# Patient Record
Sex: FEMALE | Race: WHITE | Employment: OTHER | URBAN - METROPOLITAN AREA
[De-identification: names, ages, dates, MRNs, and addresses within clinical notes are randomized per-mention and may not be internally consistent; named-entity substitution may affect disease eponyms.]

---

## 2018-09-18 ENCOUNTER — HOSPITAL ENCOUNTER (EMERGENCY)
Age: 83
Discharge: HOME | End: 2018-09-19
Attending: EMERGENCY MEDICINE
Payer: MEDICARE

## 2018-09-18 DIAGNOSIS — R33.9 URINARY RETENTION: Primary | ICD-10-CM

## 2018-09-18 LAB
BACTERIA: ABNORMAL /HPF
BILIRUBIN URINE: NEGATIVE
BLOOD, URINE: NEGATIVE
CLARITY: CLEAR
COLOR: YELLOW
GLUCOSE URINE: NEGATIVE MG/DL
KETONES, URINE: NEGATIVE MG/DL
LEUKOCYTE ESTERASE, URINE: ABNORMAL
NITRITE, URINE: NEGATIVE
PH UA: 6.5 (ref 5–9)
PROTEIN UA: NEGATIVE MG/DL
RBC UA: ABNORMAL /HPF (ref 0–2)
SPECIFIC GRAVITY UA: <=1.005 (ref 1–1.03)
UROBILINOGEN, URINE: 0.2 E.U./DL
WBC UA: ABNORMAL /HPF (ref 0–5)

## 2018-09-18 PROCEDURE — 51702 INSERT TEMP BLADDER CATH: CPT

## 2018-09-18 PROCEDURE — 81001 URINALYSIS AUTO W/SCOPE: CPT

## 2018-09-18 PROCEDURE — 99283 EMERGENCY DEPT VISIT LOW MDM: CPT

## 2018-09-18 ASSESSMENT — ENCOUNTER SYMPTOMS
DIARRHEA: 0
VOMITING: 0
NAUSEA: 0
ABDOMINAL PAIN: 0
COLOR CHANGE: 0
CONSTIPATION: 0
SHORTNESS OF BREATH: 0

## 2018-09-19 VITALS
SYSTOLIC BLOOD PRESSURE: 151 MMHG | BODY MASS INDEX: 23.92 KG/M2 | HEIGHT: 62 IN | WEIGHT: 130 LBS | DIASTOLIC BLOOD PRESSURE: 79 MMHG | HEART RATE: 66 BPM | OXYGEN SATURATION: 97 % | TEMPERATURE: 98.6 F | RESPIRATION RATE: 16 BRPM

## 2018-09-19 NOTE — ED PROVIDER NOTES
ED ATTENDING NOTE    I saw and examined the patient. I discussed the history and examination with the resident and agree with the plan of care         HPI: Pt presents with urinary retention, hasn't been able to urinate since this am, pt has lower abdominal pressure, pt denies f/c, ha, cp, sob, cough, n/v/d. Pt is lying in bed in no acute distress. --------------------------------------------- PAST HISTORY ---------------------------------------------  Past Medical History:  has a past medical history of Epiretinal membrane. Past Surgical History:  has a past surgical history that includes Tonsillectomy and adenoidectomy; Dilation and curettage of uterus; Cataract removal with implant (Bilateral); repair retinal tear/hole (Left); Hemorrhoid surgery (years ago); and vitrectomy (Left, 03/11/2015). Social History:  reports that she has never smoked. She has never used smokeless tobacco. She reports that she does not drink alcohol or use drugs. Family History: family history is not on file. The patients home medications have been reviewed.     Allergies: Biaxin [clarithromycin] and Pcn [penicillins]    ROS: Review HPI for other details  Details in electronic record may supersede details below    Gen: no chills, fever  Eyes: no discharge, no change vision  ENT: no sore throat, no rhinitis  Cardiac: no chest pain, no palpitations  Resp: no sob, no cough  GI: (+) abd pain, no nausea, vomiting, or diarrhea  : no dysuria, urgency, change in color (+) retention  MS: no back pain, joint pain, no falls, no trauma   Skin: no rash, no itch  Neuro: no dizziness, headache, no focal paralysis or parasthesia  Psych: no hallucinations, no depression  Heme: no bruising, no bleeding  Other relevant systems reviewed and negative    Physical brief exam: See HPI for other details  Details in electronic record may supersede details below    Vital signs reviewed, please refer to nurses note  Constitutionall: No distress,

## 2018-09-19 NOTE — ED PROVIDER NOTES
80year old female with history of dementia, hypertension, who presents to the ED with acute urinary retention. Unable to go to the bathroom since this morning, had similar issue a month ago. Feels she needs to go, unable to go. Patient states that she has some mild lower abdominal pressure, has been having regular bowel movements, no loss of bowel or bladder function. She denies any dysuria or hematuria. She denies ever having a history of kidney stones. She is not nauseous, no chest pain, no shortness of breath. No recent medication changes. The history is provided by the patient. No  was used. Review of Systems   Constitutional: Negative for chills and fever. Respiratory: Negative for shortness of breath. Cardiovascular: Negative for chest pain and palpitations. Gastrointestinal: Negative for abdominal pain, constipation, diarrhea, nausea and vomiting. Endocrine: Negative for polyuria. Genitourinary: Negative for dysuria and hematuria. Musculoskeletal: Negative for neck pain and neck stiffness. Skin: Negative for color change, pallor, rash and wound. Neurological: Negative for weakness, light-headedness, numbness and headaches. Psychiatric/Behavioral: Negative for confusion. Physical Exam   Constitutional: She is oriented to person, place, and time. She appears well-developed and well-nourished. No distress. HENT:   Head: Normocephalic and atraumatic. Eyes: Pupils are equal, round, and reactive to light. Neck: Normal range of motion. Cardiovascular: Normal rate, regular rhythm, normal heart sounds and intact distal pulses. Exam reveals no gallop and no friction rub. No murmur heard. Pulmonary/Chest: Effort normal and breath sounds normal. No respiratory distress. She has no wheezes. She has no rales. She exhibits no tenderness. Abdominal: Soft. Bowel sounds are normal. She exhibits distension (lower abdomen). She exhibits no mass.  There is no tenderness. There is no rebound and no guarding. Musculoskeletal: Normal range of motion. She exhibits no edema, tenderness or deformity. Neurological: She is alert and oriented to person, place, and time. Skin: Skin is warm and dry. No rash noted. She is not diaphoretic. No erythema. No pallor. Psychiatric: She has a normal mood and affect. Her behavior is normal. Judgment and thought content normal.       Procedures  --------------------------------------------- PAST HISTORY ---------------------------------------------  Past Medical History:  has a past medical history of Epiretinal membrane. Past Surgical History:  has a past surgical history that includes Tonsillectomy and adenoidectomy; Dilation and curettage of uterus; Cataract removal with implant (Bilateral); repair retinal tear/hole (Left); Hemorrhoid surgery (years ago); and vitrectomy (Left, 03/11/2015). Social History:  reports that she has never smoked. She has never used smokeless tobacco. She reports that she does not drink alcohol or use drugs. Family History: family history is not on file. The patients home medications have been reviewed.     Allergies: Biaxin [clarithromycin] and Pcn [penicillins]    -------------------------------------------------- RESULTS -------------------------------------------------  Labs:  Results for orders placed or performed during the hospital encounter of 09/18/18   Urinalysis with Microscopic   Result Value Ref Range    Color, UA Yellow Straw/Yellow    Clarity, UA Clear Clear    Glucose, Ur Negative Negative mg/dL    Bilirubin Urine Negative Negative    Ketones, Urine Negative Negative mg/dL    Specific Gravity, UA <=1.005 1.005 - 1.030    Blood, Urine Negative Negative    pH, UA 6.5 5.0 - 9.0    Protein, UA Negative Negative mg/dL    Urobilinogen, Urine 0.2 <2.0 E.U./dL    Nitrite, Urine Negative Negative    Leukocyte Esterase, Urine TRACE (A) Negative    WBC, UA NONE 0 - 5 /HPF    RBC, UA with urinary retention. Patient was evaluated and found to have approximately 700 mL of urine, after Marquez placement. Bedside ultrasound did not show any evidence of hydronephrosis. Patient's laboratory did not show any evidence of UTI. Patient will be discharged at this time, and told to follow-up with the urologist. Patient is in agreement with plan at this time. Patient will be discharged with family in place, and instructed to follow-up with the urologist tomorrow. Patient was in no acute distress at this time. Patient was told to come back to the ED if symptoms worsen. Amount and/or Complexity of Data Reviewed  Clinical lab tests: ordered and reviewed        ED Course as of Sep 18 2206   Tue Sep 18, 2018   2204 Rechecked patient, who states that she \"was not feeling right\". Recheck of blood pressure was 163/85, pulse of 73. She was not complaining of any pain at this time. Repeat physical exam was unchanged.   [KS]      ED Course User Index  [KS] DO Hazel Cervantes DO  Resident  09/19/18 0511

## 2018-09-19 NOTE — ED NOTES
Leg bag attached to cath at this time, patient and son verbalize understanding of care of cath and know to call urologist in [place] [address], [facility]  09/19/18 0147

## 2018-09-24 ENCOUNTER — HOSPITAL ENCOUNTER (EMERGENCY)
Age: 83
Discharge: HOME | End: 2018-09-24
Attending: EMERGENCY MEDICINE
Payer: MEDICARE

## 2018-09-24 VITALS
HEART RATE: 60 BPM | RESPIRATION RATE: 16 BRPM | TEMPERATURE: 97.6 F | OXYGEN SATURATION: 97 % | HEIGHT: 62 IN | SYSTOLIC BLOOD PRESSURE: 120 MMHG | DIASTOLIC BLOOD PRESSURE: 80 MMHG | BODY MASS INDEX: 23.92 KG/M2 | WEIGHT: 130 LBS

## 2018-09-24 VITALS
DIASTOLIC BLOOD PRESSURE: 73 MMHG | OXYGEN SATURATION: 97 % | TEMPERATURE: 98.4 F | WEIGHT: 130 LBS | HEIGHT: 62 IN | BODY MASS INDEX: 23.92 KG/M2 | SYSTOLIC BLOOD PRESSURE: 148 MMHG | RESPIRATION RATE: 16 BRPM | HEART RATE: 64 BPM

## 2018-09-24 DIAGNOSIS — T83.9XXA PROBLEM WITH URINARY CATHETER (HCC): Primary | ICD-10-CM

## 2018-09-24 DIAGNOSIS — T83.9XXA PROBLEM WITH FOLEY CATHETER, INITIAL ENCOUNTER (HCC): Primary | ICD-10-CM

## 2018-09-24 PROCEDURE — 99283 EMERGENCY DEPT VISIT LOW MDM: CPT

## 2018-09-24 ASSESSMENT — ENCOUNTER SYMPTOMS
VOMITING: 0
SHORTNESS OF BREATH: 0
BLOOD IN STOOL: 0
COUGH: 0
NAUSEA: 0
BACK PAIN: 0
ABDOMINAL PAIN: 0

## 2018-09-24 NOTE — ED NOTES
600 cc pink / light red urine drained from padilla catheter prior to d/c     Preethi Mota RN  09/24/18 9686

## 2018-09-24 NOTE — ED NOTES
This nurse flushed padilla cath with 60cc sterile water with no resistance. Patient tolerated well. 100cc of clear, bloond tinged urine returned with 2 small clots noted. This nurse had patient turn to side to check for any leaking around cath. No leaking noted.       Romario Castaneda, [place]  78/99/52 4504

## 2018-09-24 NOTE — ED PROVIDER NOTES
59-year-old female presents the ED because her Marquez catheter accident going up pulled out when getting out of bed. Patient has an appointment with urology tomorrow. Patient states she cannot urinate and would like a replacement of her Marquez catheter. Patient has no abdominal pain, fever, chills. No trouble breathing or chest pain. No weakness in her extremities. The history is provided by the patient. Review of Systems   Constitutional: Negative for chills and fever. Respiratory: Negative for cough and shortness of breath. Cardiovascular: Negative for chest pain. Gastrointestinal: Negative for abdominal pain, blood in stool, nausea and vomiting. Genitourinary: Positive for decreased urine volume. Negative for dysuria and frequency. Musculoskeletal: Negative for back pain. Skin: Negative for rash. Neurological: Negative for dizziness, weakness, light-headedness and headaches. All other systems reviewed and are negative. Physical Exam   Constitutional: She appears well-developed and well-nourished. HENT:   Head: Normocephalic and atraumatic. Eyes: Conjunctivae are normal.   Neck: Normal range of motion. Neck supple. Cardiovascular: Normal rate, regular rhythm and normal heart sounds. No murmur heard. Pulmonary/Chest: Effort normal and breath sounds normal. No respiratory distress. She has no wheezes. She has no rales. Abdominal: Soft. Bowel sounds are normal. She exhibits no distension. There is no tenderness. There is no rebound and no guarding. Musculoskeletal: She exhibits no edema. Neurological: She is alert. Skin: Skin is warm and dry. Nursing note and vitals reviewed. Procedures  --------------------------------------------- PAST HISTORY ---------------------------------------------  Past Medical History:  has a past medical history of Epiretinal membrane.     Past Surgical History:  has a past surgical history that includes Tonsillectomy and have remained hemodynamically stable throughout their entire ED visit and are stable for discharge with outpatient follow-up. The plan has been discussed in detail and they are aware of the specific conditions for emergent return, as well as the importance of follow-up. New Prescriptions    No medications on file       Diagnosis:  1. Problem with Marquez catheter, initial encounter [facility])        Disposition:  Patient's disposition: Discharge to home  Patient's condition is stable. [facility]    ED Course as of Sep 24 0712   Mon Sep 24, 2018   0712 Marquez catheter reinserted and patient instructed to keep her appointment for urology tomorrow. Patient was given warning signs to return to ED. All other questions are answered at this time.   [BM]      ED Course User Index  [BM] DO Hailey Holguin [place]  Resident  09/24/18 5618

## 2019-05-28 ENCOUNTER — OFFICE VISIT (OUTPATIENT)
Dept: FAMILY MEDICINE CLINIC | Age: 84
End: 2019-05-28
Payer: MEDICARE

## 2019-05-28 ENCOUNTER — HOSPITAL ENCOUNTER (OUTPATIENT)
Age: 84
Discharge: HOME | End: 2019-05-30
Payer: MEDICARE

## 2019-05-28 VITALS
BODY MASS INDEX: 25 KG/M2 | SYSTOLIC BLOOD PRESSURE: 118 MMHG | HEIGHT: 59 IN | HEART RATE: 64 BPM | WEIGHT: 124 LBS | DIASTOLIC BLOOD PRESSURE: 70 MMHG | OXYGEN SATURATION: 97 % | TEMPERATURE: 97.6 F | RESPIRATION RATE: 16 BRPM

## 2019-05-28 DIAGNOSIS — M15.9 PRIMARY OSTEOARTHRITIS INVOLVING MULTIPLE JOINTS: ICD-10-CM

## 2019-05-28 DIAGNOSIS — E55.9 VITAMIN D DEFICIENCY: ICD-10-CM

## 2019-05-28 DIAGNOSIS — I10 ESSENTIAL HYPERTENSION: ICD-10-CM

## 2019-05-28 DIAGNOSIS — F01.50 VASCULAR DEMENTIA WITHOUT BEHAVIORAL DISTURBANCE (HCC): Primary | ICD-10-CM

## 2019-05-28 LAB
ALBUMIN SERPL-MCNC: 4.4 G/DL (ref 3.5–5.2)
ALP BLD-CCNC: 95 U/L (ref 35–104)
ALT SERPL-CCNC: 19 U/L (ref 0–32)
ANION GAP SERPL CALCULATED.3IONS-SCNC: 12 MMOL/L (ref 7–16)
AST SERPL-CCNC: 31 U/L (ref 0–31)
BACTERIA: ABNORMAL /HPF
BASOPHILS ABSOLUTE: 0.02 E9/L (ref 0–0.2)
BASOPHILS RELATIVE PERCENT: 0.5 % (ref 0–2)
BILIRUB SERPL-MCNC: 0.7 MG/DL (ref 0–1.2)
BILIRUBIN URINE: NEGATIVE
BLOOD, URINE: ABNORMAL
BUN BLDV-MCNC: 10 MG/DL (ref 8–23)
CALCIUM SERPL-MCNC: 9.5 MG/DL (ref 8.6–10.2)
CHLORIDE BLD-SCNC: 92 MMOL/L (ref 98–107)
CHOLESTEROL, TOTAL: 231 MG/DL (ref 0–199)
CLARITY: CLEAR
CO2: 28 MMOL/L (ref 22–29)
COLOR: YELLOW
CREAT SERPL-MCNC: 0.8 MG/DL (ref 0.5–1)
EOSINOPHILS ABSOLUTE: 0.18 E9/L (ref 0.05–0.5)
EOSINOPHILS RELATIVE PERCENT: 4.6 % (ref 0–6)
GFR AFRICAN AMERICAN: >60
GFR NON-AFRICAN AMERICAN: >60 ML/MIN/1.73
GLUCOSE FASTING: 89 MG/DL (ref 74–99)
GLUCOSE URINE: NEGATIVE MG/DL
HCT VFR BLD CALC: 41 % (ref 34–48)
HDLC SERPL-MCNC: 94 MG/DL
HEMOGLOBIN: 13.3 G/DL (ref 11.5–15.5)
IMMATURE GRANULOCYTES #: 0.02 E9/L
IMMATURE GRANULOCYTES %: 0.5 % (ref 0–5)
KETONES, URINE: NEGATIVE MG/DL
LDL CHOLESTEROL CALCULATED: 123 MG/DL (ref 0–99)
LEUKOCYTE ESTERASE, URINE: ABNORMAL
LYMPHOCYTES ABSOLUTE: 0.83 E9/L (ref 1.5–4)
LYMPHOCYTES RELATIVE PERCENT: 21.2 % (ref 20–42)
MCH RBC QN AUTO: 30.4 PG (ref 26–35)
MCHC RBC AUTO-ENTMCNC: 32.4 % (ref 32–34.5)
MCV RBC AUTO: 93.8 FL (ref 80–99.9)
MONOCYTES ABSOLUTE: 0.36 E9/L (ref 0.1–0.95)
MONOCYTES RELATIVE PERCENT: 9.2 % (ref 2–12)
NEUTROPHILS ABSOLUTE: 2.5 E9/L (ref 1.8–7.3)
NEUTROPHILS RELATIVE PERCENT: 64 % (ref 43–80)
NITRITE, URINE: POSITIVE
PDW BLD-RTO: 13.7 FL (ref 11.5–15)
PH UA: 5 (ref 5–9)
PLATELET # BLD: 207 E9/L (ref 130–450)
PMV BLD AUTO: 9.7 FL (ref 7–12)
POTASSIUM SERPL-SCNC: 5.1 MMOL/L (ref 3.5–5)
PROTEIN UA: NEGATIVE MG/DL
RBC # BLD: 4.37 E12/L (ref 3.5–5.5)
RBC UA: ABNORMAL /HPF (ref 0–2)
RENAL EPITHELIAL, UA: ABNORMAL /HPF
SODIUM BLD-SCNC: 132 MMOL/L (ref 132–146)
SPECIFIC GRAVITY UA: 1.02 (ref 1–1.03)
TOTAL PROTEIN: 6.8 G/DL (ref 6.4–8.3)
TRIGL SERPL-MCNC: 70 MG/DL (ref 0–149)
TSH SERPL DL<=0.05 MIU/L-ACNC: 1.12 UIU/ML (ref 0.27–4.2)
UROBILINOGEN, URINE: 0.2 E.U./DL
VITAMIN D 25-HYDROXY: 31 NG/ML (ref 30–100)
VLDLC SERPL CALC-MCNC: 14 MG/DL
WBC # BLD: 3.9 E9/L (ref 4.5–11.5)
WBC UA: ABNORMAL /HPF (ref 0–5)

## 2019-05-28 PROCEDURE — 82306 VITAMIN D 25 HYDROXY: CPT

## 2019-05-28 PROCEDURE — 99214 OFFICE O/P EST MOD 30 MIN: CPT | Performed by: FAMILY MEDICINE

## 2019-05-28 PROCEDURE — 84443 ASSAY THYROID STIM HORMONE: CPT

## 2019-05-28 PROCEDURE — 85025 COMPLETE CBC W/AUTO DIFF WBC: CPT

## 2019-05-28 PROCEDURE — 80053 COMPREHEN METABOLIC PANEL: CPT

## 2019-05-28 PROCEDURE — 36415 COLL VENOUS BLD VENIPUNCTURE: CPT

## 2019-05-28 PROCEDURE — 81001 URINALYSIS AUTO W/SCOPE: CPT

## 2019-05-28 PROCEDURE — 80061 LIPID PANEL: CPT

## 2019-05-28 RX ORDER — KETOCONAZOLE 20 MG/G
CREAM TOPICAL
COMMUNITY
Start: 2019-05-17 | End: 2019-07-17

## 2019-05-28 RX ORDER — MEMANTINE HYDROCHLORIDE 10 MG/1
10 TABLET ORAL 2 TIMES DAILY
Qty: 60 TABLET | Refills: 2 | Status: SHIPPED | OUTPATIENT
Start: 2019-05-28 | End: 2019-10-30 | Stop reason: SDUPTHER

## 2019-05-28 RX ORDER — LISINOPRIL AND HYDROCHLOROTHIAZIDE 12.5; 1 MG/1; MG/1
TABLET ORAL
COMMUNITY
Start: 2019-02-27 | End: 2019-05-29 | Stop reason: SDUPTHER

## 2019-05-28 RX ORDER — MEMANTINE HYDROCHLORIDE 10 MG/1
TABLET ORAL
COMMUNITY
Start: 2019-05-06 | End: 2019-05-28 | Stop reason: SDUPTHER

## 2019-05-28 ASSESSMENT — ENCOUNTER SYMPTOMS
EYE DISCHARGE: 0
VOMITING: 0
SORE THROAT: 0
BACK PAIN: 0
NAUSEA: 0
EYE PAIN: 0
ALLERGIC/IMMUNOLOGIC NEGATIVE: 1
ABDOMINAL PAIN: 0
TROUBLE SWALLOWING: 0
EYE REDNESS: 0
CHEST TIGHTNESS: 0
SHORTNESS OF BREATH: 0
BLOOD IN STOOL: 0
PHOTOPHOBIA: 0
SINUS PAIN: 0
COUGH: 0
DIARRHEA: 0

## 2019-05-28 ASSESSMENT — PATIENT HEALTH QUESTIONNAIRE - PHQ9
SUM OF ALL RESPONSES TO PHQ QUESTIONS 1-9: 0
SUM OF ALL RESPONSES TO PHQ9 QUESTIONS 1 & 2: 0
1. LITTLE INTEREST OR PLEASURE IN DOING THINGS: 0
2. FEELING DOWN, DEPRESSED OR HOPELESS: 0
SUM OF ALL RESPONSES TO PHQ QUESTIONS 1-9: 0

## 2019-05-28 NOTE — PROGRESS NOTES
19  Chip Hodgkins : 3/13/1932 Sex: female  Age: 80 y.o. Chief Complaint   Patient presents with    Hypertension       Recheck dementia. Remains status quo. Tolerating Namenda well      Review of Systems   Constitutional: Negative for appetite change, fatigue and unexpected weight change. HENT: Negative for congestion, ear pain, hearing loss, sinus pain, sore throat and trouble swallowing. Eyes: Negative for photophobia, pain, discharge and redness. Respiratory: Negative for cough, chest tightness and shortness of breath. Cardiovascular: Negative for chest pain, palpitations and leg swelling. Gastrointestinal: Negative for abdominal pain, blood in stool, diarrhea, nausea and vomiting. Endocrine: Negative. Genitourinary: Negative for dysuria, flank pain, frequency and hematuria. Musculoskeletal: Negative for arthralgias, back pain, joint swelling and myalgias. Skin: Negative. Allergic/Immunologic: Negative. Neurological: Negative for dizziness, seizures, syncope, weakness, light-headedness, numbness and headaches. Hematological: Negative for adenopathy. Does not bruise/bleed easily. Psychiatric/Behavioral: Positive for confusion and decreased concentration. Negative for agitation, behavioral problems, dysphoric mood, hallucinations and suicidal ideas. The patient is nervous/anxious. The patient is not hyperactive. Current Outpatient Medications:     lisinopril-hydrochlorothiazide (PRINZIDE;ZESTORETIC) 10-12.5 MG per tablet, , Disp: , Rfl:     ketoconazole (NIZORAL) 2 % cream, , Disp: , Rfl:     vitamin D (CHOLECALCIFEROL) 1000 UNIT TABS tablet, Take by mouth, Disp: , Rfl:     aspirin 81 MG tablet, Take by mouth, Disp: , Rfl:     memantine (NAMENDA) 10 MG tablet, Take 1 tablet by mouth 2 times daily, Disp: 60 tablet, Rfl: 2    Vitamin E 100 UNITS TABS, Take 1 capsule by mouth daily. , Disp: , Rfl:     Multiple Vitamins-Minerals (THERAPEUTIC MULTIVITAMIN-MINERALS) tablet, Take 1 tablet by mouth daily. , Disp: , Rfl:   Allergies   Allergen Reactions    Biaxin [Clarithromycin] Other (See Comments)     Many years ago, does not recall reaction    Pcn [Penicillins] Rash       Past Medical History:   Diagnosis Date    Epiretinal membrane      Past Surgical History:   Procedure Laterality Date    CATARACT REMOVAL WITH IMPLANT Bilateral     DILATION AND CURETTAGE OF UTERUS      HEMORRHOID SURGERY  years ago    REPAIR RETINAL TEAR/HOLE Left     TONSILLECTOMY AND ADENOIDECTOMY      VITRECTOMY Left 03/11/2015     No family history on file. Social History     Socioeconomic History    Marital status:       Spouse name: Not on file    Number of children: Not on file    Years of education: Not on file    Highest education level: Not on file   Occupational History    Not on file   Social Needs    Financial resource strain: Not on file    Food insecurity:     Worry: Not on file     Inability: Not on file    Transportation needs:     Medical: Not on file     Non-medical: Not on file   Tobacco Use    Smoking status: Never Smoker    Smokeless tobacco: Never Used   Substance and Sexual Activity    Alcohol use: No    Drug use: No    Sexual activity: Not on file   Lifestyle    Physical activity:     Days per week: Not on file     Minutes per session: Not on file    Stress: Not on file   Relationships    Social connections:     Talks on phone: Not on file     Gets together: Not on file     Attends Sikh service: Not on file     Active member of club or organization: Not on file     Attends meetings of clubs or organizations: Not on file     Relationship status: Not on file    Intimate partner violence:     Fear of current or ex partner: Not on file     Emotionally abused: Not on file     Physically abused: Not on file     Forced sexual activity: Not on file   Other Topics Concern    Not on file   Social History Narrative    Not on file joints  -     Vitamin D 25 Hydroxy; Future        Return in about 3 months (around 8/28/2019).       Seen By:  Pato Sanders DO

## 2019-05-29 RX ORDER — LISINOPRIL AND HYDROCHLOROTHIAZIDE 12.5; 1 MG/1; MG/1
1 TABLET ORAL DAILY
Qty: 30 TABLET | Refills: 5 | Status: SHIPPED | OUTPATIENT
Start: 2019-05-29 | End: 2019-10-30 | Stop reason: SDUPTHER

## 2019-05-30 ENCOUNTER — TELEPHONE (OUTPATIENT)
Dept: FAMILY MEDICINE CLINIC | Age: 84
End: 2019-05-30

## 2019-05-30 DIAGNOSIS — R30.0 DYSURIA: Primary | ICD-10-CM

## 2019-05-30 NOTE — TELEPHONE ENCOUNTER
Pt son notified. Will bring in another urine sample for C&S tomorrow. Order for the C&S will need placed.

## 2019-05-31 ENCOUNTER — HOSPITAL ENCOUNTER (OUTPATIENT)
Age: 84
Discharge: HOME | End: 2019-06-02
Payer: MEDICARE

## 2019-05-31 DIAGNOSIS — R30.0 DYSURIA: ICD-10-CM

## 2019-05-31 DIAGNOSIS — R30.0 DYSURIA: Primary | ICD-10-CM

## 2019-05-31 LAB
APPEARANCE FLUID: NORMAL
BILIRUBIN, POC: NORMAL
BLOOD URINE, POC: NORMAL
CLARITY, POC: CLEAR
COLOR, POC: YELLOW
GLUCOSE URINE, POC: NORMAL
KETONES, POC: NORMAL
LEUKOCYTE EST, POC: NORMAL
NITRITE, POC: NORMAL
PH, POC: 7.5
PROTEIN, POC: NORMAL
SPECIFIC GRAVITY, POC: 1.01
UROBILINOGEN, POC: 0.2

## 2019-05-31 PROCEDURE — 87088 URINE BACTERIA CULTURE: CPT

## 2019-05-31 PROCEDURE — 81002 URINALYSIS NONAUTO W/O SCOPE: CPT | Performed by: FAMILY MEDICINE

## 2019-06-02 LAB — URINE CULTURE, ROUTINE: NORMAL

## 2019-06-23 ENCOUNTER — HOSPITAL ENCOUNTER (EMERGENCY)
Age: 84
Discharge: HOME | End: 2019-06-23
Attending: EMERGENCY MEDICINE
Payer: MEDICARE

## 2019-06-23 VITALS
BODY MASS INDEX: 23.55 KG/M2 | RESPIRATION RATE: 16 BRPM | HEART RATE: 69 BPM | SYSTOLIC BLOOD PRESSURE: 178 MMHG | OXYGEN SATURATION: 95 % | TEMPERATURE: 97.5 F | HEIGHT: 62 IN | WEIGHT: 128 LBS | DIASTOLIC BLOOD PRESSURE: 81 MMHG

## 2019-06-23 DIAGNOSIS — R33.8 ACUTE URINARY RETENTION: Primary | ICD-10-CM

## 2019-06-23 LAB
BILIRUBIN URINE: NEGATIVE
BLOOD, URINE: NEGATIVE
CLARITY: CLEAR
COLOR: YELLOW
GLUCOSE URINE: NEGATIVE MG/DL
KETONES, URINE: NEGATIVE MG/DL
LEUKOCYTE ESTERASE, URINE: NEGATIVE
NITRITE, URINE: NEGATIVE
PH UA: 7.5 (ref 5–9)
PROTEIN UA: NEGATIVE MG/DL
SPECIFIC GRAVITY UA: <=1.005 (ref 1–1.03)
UROBILINOGEN, URINE: 0.2 E.U./DL

## 2019-06-23 PROCEDURE — 51702 INSERT TEMP BLADDER CATH: CPT

## 2019-06-23 PROCEDURE — 81003 URINALYSIS AUTO W/O SCOPE: CPT

## 2019-06-23 PROCEDURE — 99283 EMERGENCY DEPT VISIT LOW MDM: CPT

## 2019-06-23 ASSESSMENT — ENCOUNTER SYMPTOMS
RESPIRATORY NEGATIVE: 1
EYES NEGATIVE: 1
GASTROINTESTINAL NEGATIVE: 1

## 2019-06-23 NOTE — ED PROVIDER NOTES
80 y.o female presents for urinary retention. She states she last urinated yesterday, but she has been unable to urinate today at all. Pt denies vomiting, fever, dysuria, or flank pain. The history is provided by the patient. No  was used. Review of Systems   Constitutional: Negative. HENT: Negative. Eyes: Negative. Respiratory: Negative. Gastrointestinal: Negative. Genitourinary: Positive for decreased urine volume and difficulty urinating. Negative for flank pain. Musculoskeletal: Negative. Neurological: Negative. Physical Exam   Constitutional: She is oriented to person, place, and time. She appears well-developed and well-nourished. HENT:   Head: Normocephalic and atraumatic. Eyes: Pupils are equal, round, and reactive to light. Conjunctivae and EOM are normal.   Neck: Neck supple. Abdominal: There is tenderness (Suprapubic tenderness). Genitourinary:   Genitourinary Comments: Distended bladder. Neurological: She is alert and oriented to person, place, and time. Skin: Skin is warm and dry. Psychiatric: She has a normal mood and affect. Her behavior is normal. Judgment and thought content normal.       Procedures    MDM  Number of Diagnoses or Management Options  Acute urinary retention: new and requires workup  Diagnosis management comments: Differential diagnoses include acute urinary retention, UTI, cystitis, kidney stone. Amount and/or Complexity of Data Reviewed  Clinical lab tests: reviewed and ordered    Risk of Complications, Morbidity, and/or Mortality  Presenting problems: high  Diagnostic procedures: moderate  Management options: high  General comments: Patient remained stable throughout the course of treatment. Marquez catheter was placed in. Total of approximately 400 cc urine was collected. Leg bag was applied. UA was negative. Will discharge patient home to follow-up with her PCP or urologist on-call.   Patient understood and agreed with our management. Labs      Radiology      EKG Interpretation.          --------------------------------------------- PAST HISTORY ---------------------------------------------  Past Medical History:  has a past medical history of Epiretinal membrane. Past Surgical History:  has a past surgical history that includes Tonsillectomy and adenoidectomy; Dilation and curettage of uterus; Cataract removal with implant (Bilateral); repair retinal tear/hole (Left); Hemorrhoid surgery (years ago); and vitrectomy (Left, 03/11/2015). Social History:  reports that she has never smoked. She has never used smokeless tobacco. She reports that she does not drink alcohol or use drugs. Family History: family history is not on file. The patients home medications have been reviewed. Allergies: Biaxin [clarithromycin] and Pcn [penicillins]    -------------------------------------------------- RESULTS -------------------------------------------------  Labs:  Results for orders placed or performed during the hospital encounter of 06/23/19   Urinalysis, reflex to microscopic   Result Value Ref Range    Color, UA Yellow Straw/Yellow    Clarity, UA Clear Clear    Glucose, Ur Negative Negative mg/dL    Bilirubin Urine Negative Negative    Ketones, Urine Negative Negative mg/dL    Specific Gravity, UA <=1.005 1.005 - 1.030    Blood, Urine Negative Negative    pH, UA 7.5 5.0 - 9.0    Protein, UA Negative Negative mg/dL    Urobilinogen, Urine 0.2 <2.0 E.U./dL    Nitrite, Urine Negative Negative    Leukocyte Esterase, Urine Negative Negative       Radiology:  No orders to display       ------------------------- NURSING NOTES AND VITALS REVIEWED ---------------------------  Date / Time Roomed:  6/23/2019  5:16 PM  ED Bed Assignment:  14/14    The nursing notes within the ED encounter and vital signs as below have been reviewed.    BP (!) 186/78   Pulse 73   Temp 97.5 °F (36.4 °C) (Oral)   Resp 20   Ht 5' 2\" (1.575 m)   Wt 128 lb (58.1 kg)   SpO2 98%   BMI 23.41 kg/m²   Oxygen Saturation Interpretation: Normal      ------------------------------------------ PROGRESS NOTES ------------------------------------------  I have spoken with the patient and discussed todays results, in addition to providing specific details for the plan of care and counseling regarding the diagnosis and prognosis. Their questions are answered at this time and they are agreeable with the plan. I discussed at length with them reasons for immediate return here for re evaluation. They will followup with primary care by calling their office tomorrow. --------------------------------- ADDITIONAL PROVIDER NOTES ---------------------------------  At this time the patient is without objective evidence of an acute process requiring hospitalization or inpatient management. They have remained hemodynamically stable throughout their entire ED visit and are stable for discharge with outpatient follow-up. The plan has been discussed in detail and they are aware of the specific conditions for emergent return, as well as the importance of follow-up. New Prescriptions    No medications on file       Diagnosis:  1. Acute urinary retention Stable       Disposition:  Patient's disposition: Discharge to home  Patient's condition is stable.          Jude Osler, DO  06/23/19 7709

## 2019-06-24 ENCOUNTER — TELEPHONE (OUTPATIENT)
Dept: FAMILY MEDICINE CLINIC | Age: 84
End: 2019-06-24

## 2019-06-24 ENCOUNTER — HOSPITAL ENCOUNTER (EMERGENCY)
Age: 84
Discharge: HOME | End: 2019-06-24
Attending: EMERGENCY MEDICINE
Payer: MEDICARE

## 2019-06-24 VITALS
HEIGHT: 62 IN | OXYGEN SATURATION: 96 % | SYSTOLIC BLOOD PRESSURE: 155 MMHG | BODY MASS INDEX: 23.92 KG/M2 | RESPIRATION RATE: 20 BRPM | WEIGHT: 130 LBS | TEMPERATURE: 98.2 F | HEART RATE: 96 BPM | DIASTOLIC BLOOD PRESSURE: 70 MMHG

## 2019-06-24 DIAGNOSIS — E87.1 HYPONATREMIA: Primary | ICD-10-CM

## 2019-06-24 LAB
ANION GAP SERPL CALCULATED.3IONS-SCNC: 12 MMOL/L (ref 7–16)
BACTERIA: NORMAL /HPF
BILIRUBIN URINE: NEGATIVE
BLOOD, URINE: ABNORMAL
BUN BLDV-MCNC: 10 MG/DL (ref 8–23)
CALCIUM SERPL-MCNC: 9.2 MG/DL (ref 8.6–10.2)
CHLORIDE BLD-SCNC: 83 MMOL/L (ref 98–107)
CHLORIDE URINE RANDOM: <20 MMOL/L
CLARITY: CLEAR
CO2: 25 MMOL/L (ref 22–29)
COLOR: YELLOW
CREAT SERPL-MCNC: 0.7 MG/DL (ref 0.5–1)
CREATININE URINE: 21 MG/DL (ref 29–226)
GFR AFRICAN AMERICAN: >60
GFR NON-AFRICAN AMERICAN: >60 ML/MIN/1.73
GLUCOSE BLD-MCNC: 99 MG/DL (ref 74–99)
GLUCOSE URINE: NEGATIVE MG/DL
HCT VFR BLD CALC: 37 % (ref 34–48)
HEMOGLOBIN: 12.9 G/DL (ref 11.5–15.5)
KETONES, URINE: NEGATIVE MG/DL
LEUKOCYTE ESTERASE, URINE: ABNORMAL
MCH RBC QN AUTO: 31.2 PG (ref 26–35)
MCHC RBC AUTO-ENTMCNC: 34.9 % (ref 32–34.5)
MCV RBC AUTO: 89.6 FL (ref 80–99.9)
NITRITE, URINE: NEGATIVE
PDW BLD-RTO: 13.4 FL (ref 11.5–15)
PH UA: 6.5 (ref 5–9)
PLATELET # BLD: 206 E9/L (ref 130–450)
PMV BLD AUTO: 8.9 FL (ref 7–12)
POTASSIUM REFLEX MAGNESIUM: 4.4 MMOL/L (ref 3.5–5)
POTASSIUM, UR: 23 MMOL/L
PROTEIN UA: NEGATIVE MG/DL
RBC # BLD: 4.13 E12/L (ref 3.5–5.5)
RBC UA: NORMAL /HPF (ref 0–2)
SODIUM BLD-SCNC: 120 MMOL/L (ref 132–146)
SODIUM URINE: <20 MMOL/L
SPECIFIC GRAVITY UA: <=1.005 (ref 1–1.03)
UROBILINOGEN, URINE: 0.2 E.U./DL
WBC # BLD: 5.5 E9/L (ref 4.5–11.5)
WBC UA: NORMAL /HPF (ref 0–5)

## 2019-06-24 PROCEDURE — 85027 COMPLETE CBC AUTOMATED: CPT

## 2019-06-24 PROCEDURE — 82436 ASSAY OF URINE CHLORIDE: CPT

## 2019-06-24 PROCEDURE — 99284 EMERGENCY DEPT VISIT MOD MDM: CPT

## 2019-06-24 PROCEDURE — 84300 ASSAY OF URINE SODIUM: CPT

## 2019-06-24 PROCEDURE — 80048 BASIC METABOLIC PNL TOTAL CA: CPT

## 2019-06-24 PROCEDURE — 82570 ASSAY OF URINE CREATININE: CPT

## 2019-06-24 PROCEDURE — 84133 ASSAY OF URINE POTASSIUM: CPT

## 2019-06-24 PROCEDURE — 81001 URINALYSIS AUTO W/SCOPE: CPT

## 2019-06-24 PROCEDURE — 87088 URINE BACTERIA CULTURE: CPT

## 2019-06-24 RX ORDER — CLONIDINE HYDROCHLORIDE 0.1 MG/1
0.1 TABLET ORAL ONCE
Status: DISCONTINUED | OUTPATIENT
Start: 2019-06-24 | End: 2019-06-24 | Stop reason: HOSPADM

## 2019-06-24 ASSESSMENT — PAIN DESCRIPTION - PAIN TYPE: TYPE: ACUTE PAIN

## 2019-06-24 ASSESSMENT — PAIN DESCRIPTION - LOCATION: LOCATION: ABDOMEN

## 2019-06-24 ASSESSMENT — PAIN SCALES - GENERAL: PAINLEVEL_OUTOF10: 5

## 2019-06-24 NOTE — ED PROVIDER NOTES
HPI:  6/24/19,   Time: 6:36 PM         Carrington Galvin is a 80 y.o. female presenting to the ED for decreased urinary output after she had a Marquez catheter placed last night due to inability to urinate, beginning a few days ago. The complaint has been intermittent, mild in severity, and worsened by nothing. Son denies that the patient is more confused. Patient has been eating and drinking normally. Patient has no new medications. ROS:   Pertinent positives and negatives are stated within HPI, all other systems reviewed and are negative.  --------------------------------------------- PAST HISTORY ---------------------------------------------  Past Medical History:  has a past medical history of Epiretinal membrane. Past Surgical History:  has a past surgical history that includes Tonsillectomy and adenoidectomy; Dilation and curettage of uterus; Cataract removal with implant (Bilateral); repair retinal tear/hole (Left); Hemorrhoid surgery (years ago); and vitrectomy (Left, 03/11/2015). Social History:  reports that she has never smoked. She has never used smokeless tobacco. She reports that she does not drink alcohol or use drugs. Family History: family history is not on file. The patients home medications have been reviewed.     Allergies: Biaxin [clarithromycin] and Pcn [penicillins]    -------------------------------------------------- RESULTS -------------------------------------------------  All laboratory and radiology results have been personally reviewed by myself   LABS:  Results for orders placed or performed during the hospital encounter of 06/24/19   CBC   Result Value Ref Range    WBC 5.5 4.5 - 11.5 E9/L    RBC 4.13 3.50 - 5.50 E12/L    Hemoglobin 12.9 11.5 - 15.5 g/dL    Hematocrit 37.0 34.0 - 48.0 %    MCV 89.6 80.0 - 99.9 fL    MCH 31.2 26.0 - 35.0 pg    MCHC 34.9 (H) 32.0 - 34.5 %    RDW 13.4 11.5 - 15.0 fL    Platelets 726 745 - 388 E9/L    MPV 8.9 7.0 - 12.0 fL   Basic Metabolic Panel w/ Reflex to MG   Result Value Ref Range    Sodium 120 (L) 132 - 146 mmol/L    Potassium reflex Magnesium 4.4 3.5 - 5.0 mmol/L    Chloride 83 (L) 98 - 107 mmol/L    CO2 25 22 - 29 mmol/L    Anion Gap 12 7 - 16 mmol/L    Glucose 99 74 - 99 mg/dL    BUN 10 8 - 23 mg/dL    CREATININE 0.7 0.5 - 1.0 mg/dL    GFR Non-African American >60 >=60 mL/min/1.73    GFR African American >60     Calcium 9.2 8.6 - 10.2 mg/dL   Urinalysis, reflex to microscopic   Result Value Ref Range    Color, UA Yellow Straw/Yellow    Clarity, UA Clear Clear    Glucose, Ur Negative Negative mg/dL    Bilirubin Urine Negative Negative    Ketones, Urine Negative Negative mg/dL    Specific Gravity, UA <=1.005 1.005 - 1.030    Blood, Urine LARGE (A) Negative    pH, UA 6.5 5.0 - 9.0    Protein, UA Negative Negative mg/dL    Urobilinogen, Urine 0.2 <2.0 E.U./dL    Nitrite, Urine Negative Negative    Leukocyte Esterase, Urine TRACE (A) Negative   Microscopic Urinalysis   Result Value Ref Range    WBC, UA 0-1 0 - 5 /HPF    RBC, UA 0-1 0 - 2 /HPF    Bacteria, UA NONE /HPF       RADIOLOGY:  Interpreted by Radiologist.  No orders to display       ------------------------- NURSING NOTES AND VITALS REVIEWED ---------------------------   The nursing notes within the ED encounter and vital signs as below have been reviewed. BP (!) 155/70   Pulse 96   Temp 98.2 °F (36.8 °C) (Oral)   Resp 20   Ht 5' 2\" (1.575 m)   Wt 130 lb (59 kg)   SpO2 96%   BMI 23.78 kg/m²   Oxygen Saturation Interpretation: Normal      ---------------------------------------------------PHYSICAL EXAM--------------------------------------      Constitutional/General: Alert and oriented x3, well appearing, non toxic in NAD  Head: NC/AT  Eyes: PERRL, EOMI  Mouth: Oropharynx clear, handling secretions, no trismus  Neck: Supple, full ROM, no meningeal signs  Pulmonary: Lungs clear to auscultation bilaterally, no wheezes, rales, or rhonchi.  Not in respiratory distress  Cardiovascular: Regular rate and rhythm, no murmurs, gallops, or rubs. 2+ distal pulses  Abdomen: Soft, non tender, non distended,   Extremities: Moves all extremities x 4. Warm and well perfused  Skin: warm and dry without rash  Neurologic: GCS 15,  Psych: Normal Affect      ------------------------------ ED COURSE/MEDICAL DECISION MAKING----------------------  Medications   cloNIDine (CATAPRES) tablet 0.1 mg (has no administration in time range)         Medical Decision Making: Marquez catheter is in place there is 500+ cc of urine in the bag. Patient denies dysuria nausea vomiting fever. She is noted to be hyponatremic. She will be referred to follow-up with her primary care doctor. She definitely did not want to stay in the hospital.  A bedside ultrasound revealed no evidence of hydronephrosis in the right kidney and some very minimal hydronephrosis on the left with the balloon in the patient's bladder with almost complete collapse of the patient's bladder. Patient has a follow-up appointment with Dr. Lillian Huang tomorrow and she was referred to follow-up with Dr. Aimee Patel as well. Son understood instructions and will assist patient with transportation to the above appointments tomorrow. Patient was treated for hypertension here in the emergency department with a single dose of clonidine. Counseling: The emergency provider has spoken with the patient and family member patient and son and discussed todays results, in addition to providing specific details for the plan of care and counseling regarding the diagnosis and prognosis. Questions are answered at this time and they are agreeable with the plan.      --------------------------------- IMPRESSION AND DISPOSITION ---------------------------------    IMPRESSION  1.  Hyponatremia        DISPOSITION  Disposition: Discharge to home  Patient condition is stable                  Camron Rooney MD  06/24/19 [id number]

## 2019-06-24 NOTE — TELEPHONE ENCOUNTER
Patient's son called in she was seen at [facility] for UTI but she ended up not having a UTi. They placed a catheter and she does have appt. Tomorrow with urology. Is there a time she cn be seen this week?  Please advise

## 2019-06-24 NOTE — ED NOTES
Pt given d/c instructions. Pt to f.u with pcp in 3 days. Pt given instructions on when to return to ED. Pt verbalized understanding of instructions. Pt left in stable and ambulatory condition. Pt IV d/c without any complications.      Brunilda Asher RN  06/24/19 2037

## 2019-06-27 LAB — URINE CULTURE, ROUTINE: NORMAL

## 2019-06-28 ENCOUNTER — HOSPITAL ENCOUNTER (OUTPATIENT)
Age: 84
Discharge: HOME | End: 2019-06-30
Payer: MEDICARE

## 2019-06-28 ENCOUNTER — OFFICE VISIT (OUTPATIENT)
Dept: FAMILY MEDICINE CLINIC | Age: 84
End: 2019-06-28
Payer: MEDICARE

## 2019-06-28 VITALS
TEMPERATURE: 96.3 F | HEIGHT: 62 IN | HEART RATE: 71 BPM | WEIGHT: 125 LBS | OXYGEN SATURATION: 97 % | RESPIRATION RATE: 15 BRPM | DIASTOLIC BLOOD PRESSURE: 72 MMHG | BODY MASS INDEX: 23 KG/M2 | SYSTOLIC BLOOD PRESSURE: 126 MMHG

## 2019-06-28 DIAGNOSIS — M15.9 PRIMARY OSTEOARTHRITIS INVOLVING MULTIPLE JOINTS: ICD-10-CM

## 2019-06-28 DIAGNOSIS — E87.1 HYPONATREMIA: Primary | ICD-10-CM

## 2019-06-28 DIAGNOSIS — F01.50 VASCULAR DEMENTIA WITHOUT BEHAVIORAL DISTURBANCE (HCC): ICD-10-CM

## 2019-06-28 DIAGNOSIS — R33.9 URINARY RETENTION: ICD-10-CM

## 2019-06-28 DIAGNOSIS — I10 ESSENTIAL HYPERTENSION: ICD-10-CM

## 2019-06-28 DIAGNOSIS — E87.1 HYPONATREMIA: ICD-10-CM

## 2019-06-28 LAB
ANION GAP SERPL CALCULATED.3IONS-SCNC: 20 MMOL/L (ref 7–16)
BUN BLDV-MCNC: 11 MG/DL (ref 8–23)
CALCIUM SERPL-MCNC: 9.2 MG/DL (ref 8.6–10.2)
CHLORIDE BLD-SCNC: 83 MMOL/L (ref 98–107)
CO2: 19 MMOL/L (ref 22–29)
CREAT SERPL-MCNC: 1 MG/DL (ref 0.5–1)
GFR AFRICAN AMERICAN: >60
GFR NON-AFRICAN AMERICAN: 52 ML/MIN/1.73
GLUCOSE BLD-MCNC: 86 MG/DL (ref 74–99)
POTASSIUM SERPL-SCNC: 4.6 MMOL/L (ref 3.5–5)
SODIUM BLD-SCNC: 122 MMOL/L (ref 132–146)

## 2019-06-28 PROCEDURE — 99214 OFFICE O/P EST MOD 30 MIN: CPT | Performed by: FAMILY MEDICINE

## 2019-06-28 PROCEDURE — 36415 COLL VENOUS BLD VENIPUNCTURE: CPT

## 2019-06-28 PROCEDURE — 80048 BASIC METABOLIC PNL TOTAL CA: CPT

## 2019-06-28 ASSESSMENT — ENCOUNTER SYMPTOMS
BACK PAIN: 0
VOMITING: 0
EYE REDNESS: 0
SHORTNESS OF BREATH: 0
SINUS PAIN: 0
ABDOMINAL PAIN: 0
EYE PAIN: 0
PHOTOPHOBIA: 0
COUGH: 0
BLOOD IN STOOL: 0
NAUSEA: 0
ALLERGIC/IMMUNOLOGIC NEGATIVE: 1
TROUBLE SWALLOWING: 0
CHEST TIGHTNESS: 0
SORE THROAT: 0
DIARRHEA: 0
EYE DISCHARGE: 0

## 2019-06-28 NOTE — PROGRESS NOTES
19  Leti Núñez : 3/13/1932 Sex: female  Age: 80 y.o. Chief Complaint   Patient presents with    ED Follow-up       Seen [facility] ER twice for urinary retention, and catheter that was malfunctioning. Lab work showed 129 Na which needs rechecked today. She denies any complaints at this time. Er notes and lab reviewed      Review of Systems   Constitutional: Negative for appetite change, fatigue and unexpected weight change. HENT: Negative for congestion, ear pain, hearing loss, sinus pain, sore throat and trouble swallowing. Eyes: Negative for photophobia, pain, discharge and redness. Respiratory: Negative for cough, chest tightness and shortness of breath. Cardiovascular: Negative for chest pain, palpitations and leg swelling. Gastrointestinal: Negative for abdominal pain, blood in stool, diarrhea, nausea and vomiting. Endocrine: Negative. Genitourinary: Negative for dysuria, flank pain, frequency and hematuria. Musculoskeletal: Negative for arthralgias, back pain, joint swelling and myalgias. Skin: Negative. Allergic/Immunologic: Negative. Neurological: Negative for dizziness, seizures, syncope, weakness, light-headedness, numbness and headaches. Hematological: Negative for adenopathy. Does not bruise/bleed easily. Psychiatric/Behavioral: Negative. Current Outpatient Medications:     lisinopril-hydrochlorothiazide (PRINZIDE;ZESTORETIC) 10-12.5 MG per tablet, Take 1 tablet by mouth daily, Disp: 30 tablet, Rfl: 5    ketoconazole (NIZORAL) 2 % cream, , Disp: , Rfl:     vitamin D (CHOLECALCIFEROL) 1000 UNIT TABS tablet, Take by mouth, Disp: , Rfl:     aspirin 81 MG tablet, Take by mouth, Disp: , Rfl:     memantine (NAMENDA) 10 MG tablet, Take 1 tablet by mouth 2 times daily, Disp: 60 tablet, Rfl: 2    Vitamin E 100 UNITS TABS, Take 1 capsule by mouth daily. , Disp: , Rfl:     Multiple Vitamins-Minerals (THERAPEUTIC MULTIVITAMIN-MINERALS) tablet, Take 1 tablet by mouth daily. , Disp: , Rfl:   Allergies   Allergen Reactions    Biaxin [Clarithromycin] Other (See Comments)     Many years ago, does not recall reaction    Pcn [Penicillins] Rash       Past Medical History:   Diagnosis Date    Epiretinal membrane      Past Surgical History:   Procedure Laterality Date    CATARACT REMOVAL WITH IMPLANT Bilateral     DILATION AND CURETTAGE OF UTERUS      HEMORRHOID SURGERY  years ago    REPAIR RETINAL TEAR/HOLE Left     TONSILLECTOMY AND ADENOIDECTOMY      VITRECTOMY Left 03/11/2015     No family history on file. Social History     Socioeconomic History    Marital status:       Spouse name: Not on file    Number of children: Not on file    Years of education: Not on file    Highest education level: Not on file   Occupational History    Not on file   Social Needs    Financial resource strain: Not on file    Food insecurity:     Worry: Not on file     Inability: Not on file    Transportation needs:     Medical: Not on file     Non-medical: Not on file   Tobacco Use    Smoking status: Never Smoker    Smokeless tobacco: Never Used   Substance and Sexual Activity    Alcohol use: No    Drug use: No    Sexual activity: Not on file   Lifestyle    Physical activity:     Days per week: Not on file     Minutes per session: Not on file    Stress: Not on file   Relationships    Social connections:     Talks on phone: Not on file     Gets together: Not on file     Attends Orthodox service: Not on file     Active member of club or organization: Not on file     Attends meetings of clubs or organizations: Not on file     Relationship status: Not on file    Intimate partner violence:     Fear of current or ex partner: Not on file     Emotionally abused: Not on file     Physically abused: Not on file     Forced sexual activity: Not on file   Other Topics Concern    Not on file   Social History Narrative    Not on file       Vitals:    06/28/19 1105   BP: 126/72   Pulse: 71   Resp: 15   Temp: 96.3 °F (35.7 °C)   TempSrc: Temporal   SpO2: 97%   Weight: 125 lb (56.7 kg)   Height: 5' 2\" (1.575 m)       Physical Exam   Constitutional: She is oriented to person, place, and time. She appears well-developed and well-nourished. HENT:   Head: Atraumatic. Right Ear: External ear normal.   Left Ear: External ear normal.   Nose: Nose normal.   Mouth/Throat: Oropharynx is clear and moist. No oropharyngeal exudate. Eyes: Pupils are equal, round, and reactive to light. Conjunctivae and EOM are normal.   Neck: Normal range of motion. Neck supple. No tracheal deviation present. No thyromegaly present. Cardiovascular: Normal rate, regular rhythm and intact distal pulses. Exam reveals no gallop and no friction rub. No murmur heard. Pulmonary/Chest: Effort normal and breath sounds normal. No respiratory distress. Abdominal: Soft. Bowel sounds are normal.   Musculoskeletal: Normal range of motion. She exhibits no edema, tenderness or deformity. Lymphadenopathy:     She has no cervical adenopathy. Neurological: She is alert and oriented to person, place, and time. She displays normal reflexes. No sensory deficit. She exhibits normal muscle tone. Coordination normal.   Skin: Skin is warm and dry. Capillary refill takes less than 2 seconds. No rash noted. Psychiatric: She has a normal mood and affect. Assessment and Plan:  [place] was seen today for ed follow-up. Diagnoses and all orders for this visit:    Hyponatremia  -     Basic Metabolic Panel; Future    Urinary retention  -     Basic Metabolic Panel; Future    Vascular dementia without behavioral disturbance    Essential hypertension  -     Basic Metabolic Panel; Future    Primary osteoarthritis involving multiple joints        Return in about 2 months (around 8/28/2019).       Seen By:  Eliecer Arvizu DO

## 2019-07-08 ENCOUNTER — TELEPHONE (OUTPATIENT)
Dept: FAMILY MEDICINE CLINIC | Age: 84
End: 2019-07-08

## 2019-07-11 ENCOUNTER — TELEPHONE (OUTPATIENT)
Dept: FAMILY MEDICINE CLINIC | Age: 84
End: 2019-07-11

## 2019-07-15 ENCOUNTER — HOSPITAL ENCOUNTER (OUTPATIENT)
Age: 84
Discharge: HOME | End: 2019-07-15
Payer: MEDICARE

## 2019-07-15 LAB
BILIRUBIN URINE: NEGATIVE
BLOOD, URINE: NEGATIVE
CLARITY: CLEAR
COLOR: YELLOW
GLUCOSE URINE: NEGATIVE MG/DL
KETONES, URINE: NEGATIVE MG/DL
LEUKOCYTE ESTERASE, URINE: NEGATIVE
NITRITE, URINE: NEGATIVE
PH UA: 7.5 (ref 5–9)
PROTEIN UA: NEGATIVE MG/DL
SPECIFIC GRAVITY UA: 1.01 (ref 1–1.03)
UROBILINOGEN, URINE: 0.2 E.U./DL

## 2019-07-15 PROCEDURE — 81003 URINALYSIS AUTO W/O SCOPE: CPT

## 2019-07-15 PROCEDURE — 87088 URINE BACTERIA CULTURE: CPT

## 2019-07-17 ENCOUNTER — OFFICE VISIT (OUTPATIENT)
Dept: FAMILY MEDICINE CLINIC | Age: 84
End: 2019-07-17
Payer: MEDICARE

## 2019-07-17 VITALS
DIASTOLIC BLOOD PRESSURE: 78 MMHG | RESPIRATION RATE: 15 BRPM | SYSTOLIC BLOOD PRESSURE: 142 MMHG | BODY MASS INDEX: 24.74 KG/M2 | WEIGHT: 126 LBS | HEART RATE: 66 BPM | OXYGEN SATURATION: 96 % | TEMPERATURE: 97.4 F | HEIGHT: 60 IN

## 2019-07-17 DIAGNOSIS — F01.50 VASCULAR DEMENTIA WITHOUT BEHAVIORAL DISTURBANCE (HCC): ICD-10-CM

## 2019-07-17 DIAGNOSIS — S20.211A CONTUSION OF RIBS, RIGHT, INITIAL ENCOUNTER: Primary | ICD-10-CM

## 2019-07-17 DIAGNOSIS — F06.4 ANXIETY DISORDER DUE TO GENERAL MEDICAL CONDITION: ICD-10-CM

## 2019-07-17 LAB — URINE CULTURE, ROUTINE: NORMAL

## 2019-07-17 PROCEDURE — 99214 OFFICE O/P EST MOD 30 MIN: CPT | Performed by: FAMILY MEDICINE

## 2019-07-17 RX ORDER — HYDROXYZINE HYDROCHLORIDE 25 MG/1
TABLET, FILM COATED ORAL
Qty: 30 TABLET | Refills: 0 | Status: SHIPPED | OUTPATIENT
Start: 2019-07-17 | End: 2019-08-12 | Stop reason: SDUPTHER

## 2019-07-17 ASSESSMENT — ENCOUNTER SYMPTOMS
ALLERGIC/IMMUNOLOGIC NEGATIVE: 1
PHOTOPHOBIA: 0
BLOOD IN STOOL: 0
ABDOMINAL PAIN: 0
COUGH: 0
BACK PAIN: 0
EYE DISCHARGE: 0
TROUBLE SWALLOWING: 0
EYE PAIN: 0
DIARRHEA: 0
VOMITING: 0
CHEST TIGHTNESS: 0
EYE REDNESS: 0
SHORTNESS OF BREATH: 0
SINUS PAIN: 0
NAUSEA: 0
SORE THROAT: 0

## 2019-07-17 NOTE — PROGRESS NOTES
19  Elayne Garcia : 3/13/1932 Sex: female  Age: 80 y.o. Chief Complaint   Patient presents with    Back Pain     fell recently     Anxiety     worsening       Back pain. The patient states that they are experiencing low back pain. The pain started 2 weeks ago after a fall in the bathroom striking her right ribs. The pain is currently a 3/10 on the pain scale and is described as an ache. The patient has been using heat, rest at home at home with partial relief of their symptoms. The pain is worse with movement. The patient denies any radicular symptoms. They deny any numbness, tingling or weakness to the extremities. They deny any saddle anesthesia. No bowel or bladder incontinence. No fever or chills. No dysuria, urinary, frequency, or gross hematuria. No abdominal pain, nausea, vomiting, or diarrhea. No history of kidney stones. Review of Systems   Constitutional: Negative for appetite change, fatigue and unexpected weight change. HENT: Negative for congestion, ear pain, hearing loss, sinus pain, sore throat and trouble swallowing. Eyes: Negative for photophobia, pain, discharge and redness. Respiratory: Negative for cough, chest tightness and shortness of breath. Cardiovascular: Negative for chest pain, palpitations and leg swelling. Gastrointestinal: Negative for abdominal pain, blood in stool, diarrhea, nausea and vomiting. Endocrine: Negative. Genitourinary: Negative for dysuria, flank pain, frequency and hematuria. Musculoskeletal: Negative for arthralgias, back pain, joint swelling and myalgias. Per HPI   Skin: Negative. Allergic/Immunologic: Negative. Neurological: Negative for dizziness, seizures, syncope, weakness, light-headedness, numbness and headaches. Hematological: Negative for adenopathy. Does not bruise/bleed easily. Psychiatric/Behavioral: Positive for confusion and decreased concentration.  Negative for behavioral problems, dysphoric mood, self-injury and suicidal ideas. The patient is nervous/anxious. Current Outpatient Medications:     hydrOXYzine (ATARAX) 25 MG tablet, 1'2 tablet nightly, Disp: 30 tablet, Rfl: 0    lisinopril-hydrochlorothiazide (PRINZIDE;ZESTORETIC) 10-12.5 MG per tablet, Take 1 tablet by mouth daily, Disp: 30 tablet, Rfl: 5    vitamin D (CHOLECALCIFEROL) 1000 UNIT TABS tablet, Take by mouth, Disp: , Rfl:     aspirin 81 MG tablet, Take by mouth, Disp: , Rfl:     memantine (NAMENDA) 10 MG tablet, Take 1 tablet by mouth 2 times daily, Disp: 60 tablet, Rfl: 2    Vitamin E 100 UNITS TABS, Take 1 capsule by mouth daily. , Disp: , Rfl:     Multiple Vitamins-Minerals (THERAPEUTIC MULTIVITAMIN-MINERALS) tablet, Take 1 tablet by mouth daily. , Disp: , Rfl:   Allergies   Allergen Reactions    Biaxin [Clarithromycin] Other (See Comments)     Many years ago, does not recall reaction    Pcn [Penicillins] Rash       Past Medical History:   Diagnosis Date    Epiretinal membrane      Past Surgical History:   Procedure Laterality Date    CATARACT REMOVAL WITH IMPLANT Bilateral     DILATION AND CURETTAGE OF UTERUS      HEMORRHOID SURGERY  years ago    REPAIR RETINAL TEAR/HOLE Left     TONSILLECTOMY AND ADENOIDECTOMY      VITRECTOMY Left 03/11/2015     No family history on file. Social History     Socioeconomic History    Marital status:       Spouse name: Not on file    Number of children: Not on file    Years of education: Not on file    Highest education level: Not on file   Occupational History    Not on file   Social Needs    Financial resource strain: Not on file    Food insecurity:     Worry: Not on file     Inability: Not on file    Transportation needs:     Medical: Not on file     Non-medical: Not on file   Tobacco Use    Smoking status: Never Smoker    Smokeless tobacco: Never Used   Substance and Sexual Activity    Alcohol use: No    Drug use: No    Sexual activity: Not on file displays normal reflexes. No sensory deficit. She exhibits normal muscle tone. Coordination normal.   Skin: Skin is warm and dry. Capillary refill takes less than 2 seconds. No rash noted. Psychiatric: She has a normal mood and affect. Assessment and Plan:  Pito Carrillo was seen today for back pain and anxiety. Diagnoses and all orders for this visit:    Contusion of ribs, right, initial encounter  -     XR RIBS RIGHT INCLUDE CHEST (MIN 3 VIEWS); Future    Vascular dementia without behavioral disturbance    Anxiety disorder due to general medical condition  -     hydrOXYzine (ATARAX) 25 MG tablet; 1'2 tablet nightly        Return if symptoms worsen or fail to improve.       Seen By:  Rachel Fulton, DO

## 2019-07-22 ENCOUNTER — APPOINTMENT (OUTPATIENT)
Dept: GENERAL RADIOLOGY | Age: 84
End: 2019-07-22
Payer: MEDICARE

## 2019-07-22 ENCOUNTER — HOSPITAL ENCOUNTER (EMERGENCY)
Age: 84
Discharge: HOME | End: 2019-07-22
Attending: EMERGENCY MEDICINE
Payer: MEDICARE

## 2019-07-22 ENCOUNTER — TELEPHONE (OUTPATIENT)
Dept: FAMILY MEDICINE CLINIC | Age: 84
End: 2019-07-22

## 2019-07-22 VITALS
OXYGEN SATURATION: 95 % | BODY MASS INDEX: 23.92 KG/M2 | DIASTOLIC BLOOD PRESSURE: 82 MMHG | HEIGHT: 62 IN | SYSTOLIC BLOOD PRESSURE: 138 MMHG | TEMPERATURE: 98.2 F | RESPIRATION RATE: 16 BRPM | HEART RATE: 72 BPM | WEIGHT: 130 LBS

## 2019-07-22 DIAGNOSIS — R33.9 URINARY RETENTION: ICD-10-CM

## 2019-07-22 DIAGNOSIS — S22.31XA CLOSED FRACTURE OF ONE RIB OF RIGHT SIDE, INITIAL ENCOUNTER: Primary | ICD-10-CM

## 2019-07-22 LAB
BILIRUBIN URINE: NEGATIVE
BLOOD, URINE: NEGATIVE
CLARITY: CLEAR
COLOR: YELLOW
GLUCOSE URINE: NEGATIVE MG/DL
KETONES, URINE: NEGATIVE MG/DL
LEUKOCYTE ESTERASE, URINE: NEGATIVE
NITRITE, URINE: NEGATIVE
PH UA: 6.5 (ref 5–9)
PROTEIN UA: NEGATIVE MG/DL
SPECIFIC GRAVITY UA: <=1.005 (ref 1–1.03)
UROBILINOGEN, URINE: 0.2 E.U./DL

## 2019-07-22 PROCEDURE — 81003 URINALYSIS AUTO W/O SCOPE: CPT

## 2019-07-22 PROCEDURE — 51798 US URINE CAPACITY MEASURE: CPT

## 2019-07-22 PROCEDURE — 99283 EMERGENCY DEPT VISIT LOW MDM: CPT

## 2019-07-22 PROCEDURE — 71101 X-RAY EXAM UNILAT RIBS/CHEST: CPT

## 2019-07-22 ASSESSMENT — PAIN SCALES - GENERAL: PAINLEVEL_OUTOF10: 8

## 2019-07-22 NOTE — TELEPHONE ENCOUNTER
[place] - Son       He called for the results of the Chest Xray. I let him know about the rib fx.

## 2019-07-24 ENCOUNTER — HOSPITAL ENCOUNTER (OUTPATIENT)
Age: 84
Discharge: HOME | End: 2019-07-26

## 2019-07-24 PROCEDURE — 88112 CYTOPATH CELL ENHANCE TECH: CPT

## 2019-08-12 DIAGNOSIS — F06.4 ANXIETY DISORDER DUE TO GENERAL MEDICAL CONDITION: ICD-10-CM

## 2019-08-12 RX ORDER — HYDROXYZINE HYDROCHLORIDE 25 MG/1
TABLET, FILM COATED ORAL
Qty: 60 TABLET | Refills: 2 | Status: SHIPPED | OUTPATIENT
Start: 2019-08-12 | End: 2019-10-30 | Stop reason: SDUPTHER

## 2019-08-27 ENCOUNTER — HOSPITAL ENCOUNTER (OUTPATIENT)
Age: 84
Discharge: HOME | End: 2019-08-29
Payer: MEDICARE

## 2019-08-27 ENCOUNTER — OFFICE VISIT (OUTPATIENT)
Dept: FAMILY MEDICINE CLINIC | Age: 84
End: 2019-08-27
Payer: MEDICARE

## 2019-08-27 VITALS
OXYGEN SATURATION: 97 % | RESPIRATION RATE: 18 BRPM | SYSTOLIC BLOOD PRESSURE: 120 MMHG | WEIGHT: 121.6 LBS | BODY MASS INDEX: 22.24 KG/M2 | TEMPERATURE: 96.9 F | DIASTOLIC BLOOD PRESSURE: 64 MMHG | HEART RATE: 74 BPM

## 2019-08-27 DIAGNOSIS — E55.9 VITAMIN D DEFICIENCY: ICD-10-CM

## 2019-08-27 DIAGNOSIS — F01.50 VASCULAR DEMENTIA WITHOUT BEHAVIORAL DISTURBANCE (HCC): ICD-10-CM

## 2019-08-27 DIAGNOSIS — I10 ESSENTIAL HYPERTENSION: ICD-10-CM

## 2019-08-27 DIAGNOSIS — M15.9 PRIMARY OSTEOARTHRITIS INVOLVING MULTIPLE JOINTS: ICD-10-CM

## 2019-08-27 DIAGNOSIS — E78.2 MIXED HYPERLIPIDEMIA: ICD-10-CM

## 2019-08-27 DIAGNOSIS — I10 ESSENTIAL HYPERTENSION: Primary | ICD-10-CM

## 2019-08-27 LAB
ALBUMIN SERPL-MCNC: 4.3 G/DL (ref 3.5–5.2)
ALP BLD-CCNC: 93 U/L (ref 35–104)
ALT SERPL-CCNC: 18 U/L (ref 0–32)
ANION GAP SERPL CALCULATED.3IONS-SCNC: 13 MMOL/L (ref 7–16)
AST SERPL-CCNC: 34 U/L (ref 0–31)
BASOPHILS ABSOLUTE: 0.02 E9/L (ref 0–0.2)
BASOPHILS RELATIVE PERCENT: 0.6 % (ref 0–2)
BILIRUB SERPL-MCNC: 0.9 MG/DL (ref 0–1.2)
BILIRUBIN URINE: NEGATIVE
BLOOD, URINE: NEGATIVE
BUN BLDV-MCNC: 11 MG/DL (ref 8–23)
CALCIUM SERPL-MCNC: 9.5 MG/DL (ref 8.6–10.2)
CHLORIDE BLD-SCNC: 93 MMOL/L (ref 98–107)
CHOLESTEROL, TOTAL: 223 MG/DL (ref 0–199)
CLARITY: CLEAR
CO2: 27 MMOL/L (ref 22–29)
COLOR: YELLOW
CREAT SERPL-MCNC: 0.7 MG/DL (ref 0.5–1)
EOSINOPHILS ABSOLUTE: 0.18 E9/L (ref 0.05–0.5)
EOSINOPHILS RELATIVE PERCENT: 5.1 % (ref 0–6)
GFR AFRICAN AMERICAN: >60
GFR NON-AFRICAN AMERICAN: >60 ML/MIN/1.73
GLUCOSE FASTING: 82 MG/DL (ref 74–99)
GLUCOSE URINE: NEGATIVE MG/DL
HCT VFR BLD CALC: 39.1 % (ref 34–48)
HDLC SERPL-MCNC: 94 MG/DL
HEMOGLOBIN: 12.6 G/DL (ref 11.5–15.5)
IMMATURE GRANULOCYTES #: 0.01 E9/L
IMMATURE GRANULOCYTES %: 0.3 % (ref 0–5)
KETONES, URINE: NEGATIVE MG/DL
LDL CHOLESTEROL CALCULATED: 112 MG/DL (ref 0–99)
LEUKOCYTE ESTERASE, URINE: NEGATIVE
LYMPHOCYTES ABSOLUTE: 0.8 E9/L (ref 1.5–4)
LYMPHOCYTES RELATIVE PERCENT: 22.9 % (ref 20–42)
MCH RBC QN AUTO: 30.3 PG (ref 26–35)
MCHC RBC AUTO-ENTMCNC: 32.2 % (ref 32–34.5)
MCV RBC AUTO: 94 FL (ref 80–99.9)
MONOCYTES ABSOLUTE: 0.39 E9/L (ref 0.1–0.95)
MONOCYTES RELATIVE PERCENT: 11.1 % (ref 2–12)
NEUTROPHILS ABSOLUTE: 2.1 E9/L (ref 1.8–7.3)
NEUTROPHILS RELATIVE PERCENT: 60 % (ref 43–80)
NITRITE, URINE: NEGATIVE
PDW BLD-RTO: 14.6 FL (ref 11.5–15)
PH UA: 6.5 (ref 5–9)
PLATELET # BLD: 226 E9/L (ref 130–450)
PMV BLD AUTO: 9.2 FL (ref 7–12)
POTASSIUM SERPL-SCNC: 4.3 MMOL/L (ref 3.5–5)
PROTEIN UA: NEGATIVE MG/DL
RBC # BLD: 4.16 E12/L (ref 3.5–5.5)
SODIUM BLD-SCNC: 133 MMOL/L (ref 132–146)
SPECIFIC GRAVITY UA: <=1.005 (ref 1–1.03)
TOTAL PROTEIN: 6.6 G/DL (ref 6.4–8.3)
TRIGL SERPL-MCNC: 85 MG/DL (ref 0–149)
TSH SERPL DL<=0.05 MIU/L-ACNC: 0.97 UIU/ML (ref 0.27–4.2)
UROBILINOGEN, URINE: 0.2 E.U./DL
VITAMIN D 25-HYDROXY: 33 NG/ML (ref 30–100)
VLDLC SERPL CALC-MCNC: 17 MG/DL
WBC # BLD: 3.5 E9/L (ref 4.5–11.5)

## 2019-08-27 PROCEDURE — 80053 COMPREHEN METABOLIC PANEL: CPT

## 2019-08-27 PROCEDURE — 81003 URINALYSIS AUTO W/O SCOPE: CPT | Performed by: FAMILY MEDICINE

## 2019-08-27 PROCEDURE — 82306 VITAMIN D 25 HYDROXY: CPT

## 2019-08-27 PROCEDURE — 84443 ASSAY THYROID STIM HORMONE: CPT

## 2019-08-27 PROCEDURE — 81003 URINALYSIS AUTO W/O SCOPE: CPT

## 2019-08-27 PROCEDURE — 99214 OFFICE O/P EST MOD 30 MIN: CPT | Performed by: FAMILY MEDICINE

## 2019-08-27 PROCEDURE — 36415 COLL VENOUS BLD VENIPUNCTURE: CPT

## 2019-08-27 PROCEDURE — 80061 LIPID PANEL: CPT

## 2019-08-27 PROCEDURE — 85025 COMPLETE CBC W/AUTO DIFF WBC: CPT

## 2019-08-27 ASSESSMENT — ENCOUNTER SYMPTOMS
SORE THROAT: 0
BACK PAIN: 0
COUGH: 0
BLOOD IN STOOL: 0
EYE REDNESS: 0
DIARRHEA: 0
VOMITING: 0
CHEST TIGHTNESS: 0
SINUS PAIN: 0
NAUSEA: 0
TROUBLE SWALLOWING: 0
SHORTNESS OF BREATH: 0
PHOTOPHOBIA: 0
ABDOMINAL PAIN: 0
ALLERGIC/IMMUNOLOGIC NEGATIVE: 1
EYE PAIN: 0
EYE DISCHARGE: 0

## 2019-10-30 ENCOUNTER — OFFICE VISIT (OUTPATIENT)
Dept: FAMILY MEDICINE CLINIC | Age: 84
End: 2019-10-30
Payer: MEDICARE

## 2019-10-30 VITALS
RESPIRATION RATE: 15 BRPM | HEIGHT: 62 IN | HEART RATE: 65 BPM | WEIGHT: 121 LBS | TEMPERATURE: 97.5 F | OXYGEN SATURATION: 95 % | DIASTOLIC BLOOD PRESSURE: 70 MMHG | SYSTOLIC BLOOD PRESSURE: 120 MMHG | BODY MASS INDEX: 22.26 KG/M2

## 2019-10-30 DIAGNOSIS — F06.4 ANXIETY DISORDER DUE TO GENERAL MEDICAL CONDITION: ICD-10-CM

## 2019-10-30 DIAGNOSIS — Z23 NEED FOR VACCINATION: ICD-10-CM

## 2019-10-30 DIAGNOSIS — I10 ESSENTIAL HYPERTENSION: Primary | ICD-10-CM

## 2019-10-30 DIAGNOSIS — F01.50 VASCULAR DEMENTIA WITHOUT BEHAVIORAL DISTURBANCE (HCC): ICD-10-CM

## 2019-10-30 PROCEDURE — 90653 IIV ADJUVANT VACCINE IM: CPT | Performed by: FAMILY MEDICINE

## 2019-10-30 PROCEDURE — 90732 PPSV23 VACC 2 YRS+ SUBQ/IM: CPT | Performed by: FAMILY MEDICINE

## 2019-10-30 PROCEDURE — G0008 ADMIN INFLUENZA VIRUS VAC: HCPCS | Performed by: FAMILY MEDICINE

## 2019-10-30 PROCEDURE — 99213 OFFICE O/P EST LOW 20 MIN: CPT | Performed by: FAMILY MEDICINE

## 2019-10-30 PROCEDURE — G0009 ADMIN PNEUMOCOCCAL VACCINE: HCPCS | Performed by: FAMILY MEDICINE

## 2019-10-30 RX ORDER — LISINOPRIL AND HYDROCHLOROTHIAZIDE 12.5; 1 MG/1; MG/1
1 TABLET ORAL DAILY
Qty: 30 TABLET | Refills: 5 | Status: SHIPPED
Start: 2019-10-30 | End: 2020-04-24 | Stop reason: SDUPTHER

## 2019-10-30 RX ORDER — MEMANTINE HYDROCHLORIDE 10 MG/1
10 TABLET ORAL 2 TIMES DAILY
Qty: 60 TABLET | Refills: 5 | Status: SHIPPED
Start: 2019-10-30 | End: 2020-05-08 | Stop reason: SDUPTHER

## 2019-10-30 RX ORDER — HYDROXYZINE HYDROCHLORIDE 25 MG/1
TABLET, FILM COATED ORAL
Qty: 60 TABLET | Refills: 5 | Status: SHIPPED
Start: 2019-10-30 | End: 2020-05-08 | Stop reason: SDUPTHER

## 2019-10-30 ASSESSMENT — ENCOUNTER SYMPTOMS
TROUBLE SWALLOWING: 0
VOMITING: 0
SHORTNESS OF BREATH: 0
COUGH: 0
SINUS PAIN: 0
DIARRHEA: 0
SORE THROAT: 0
EYE DISCHARGE: 0
PHOTOPHOBIA: 0
CHEST TIGHTNESS: 0
NAUSEA: 0
ALLERGIC/IMMUNOLOGIC NEGATIVE: 1
EYE REDNESS: 0
BLOOD IN STOOL: 0
BACK PAIN: 0
ABDOMINAL PAIN: 0
EYE PAIN: 0

## 2020-02-05 ENCOUNTER — OFFICE VISIT (OUTPATIENT)
Dept: FAMILY MEDICINE CLINIC | Age: 85
End: 2020-02-05
Payer: MEDICARE

## 2020-02-05 VITALS
HEART RATE: 62 BPM | HEIGHT: 62 IN | SYSTOLIC BLOOD PRESSURE: 130 MMHG | OXYGEN SATURATION: 98 % | BODY MASS INDEX: 22.31 KG/M2 | TEMPERATURE: 97.9 F | WEIGHT: 121.2 LBS | DIASTOLIC BLOOD PRESSURE: 80 MMHG

## 2020-02-05 PROCEDURE — G0439 PPPS, SUBSEQ VISIT: HCPCS | Performed by: PHYSICIAN ASSISTANT

## 2020-02-05 ASSESSMENT — LIFESTYLE VARIABLES: HOW OFTEN DO YOU HAVE A DRINK CONTAINING ALCOHOL: 0

## 2020-02-05 ASSESSMENT — PATIENT HEALTH QUESTIONNAIRE - PHQ9
SUM OF ALL RESPONSES TO PHQ QUESTIONS 1-9: 0
SUM OF ALL RESPONSES TO PHQ QUESTIONS 1-9: 0

## 2020-02-05 NOTE — PATIENT INSTRUCTIONS
appropriate  Shingrix series for shingles is recommended.   Please check with your local pharmacy for those shots  ·

## 2020-02-05 NOTE — PROGRESS NOTES
by mouth daily. Yes Historical Provider, MD       Past Medical History:   Diagnosis Date    Dementia ([address]     Epiretinal membrane     Hard of hearing     Hyperlipidemia     Hypertension     Macular degeneration     Osteoarthritis     Vitamin D deficiency        Past Surgical History:   Procedure Laterality Date    CATARACT REMOVAL WITH IMPLANT Bilateral     COLONOSCOPY  2010    COLPOSCOPY      DILATION AND CURETTAGE OF UTERUS      HEMORRHOID SURGERY  years ago    REPAIR RETINAL TEAR/HOLE Left     TONSILLECTOMY AND ADENOIDECTOMY      VITRECTOMY Left 2015       Family History   Problem Relation Age of Onset    Colon Cancer Mother     Hypertension Mother     Lung Cancer Father         Black Lung    Breast Cancer Sister     Lung Cancer Brother     Other Other         6 siblings, most  of infirmities of old age   Liyah Ingram No Known Problems Sister     No Known Problems Sister     No Known Problems Brother     No Known Problems Brother        CareTeam (Including outside providers/suppliers regularly involved in providing care):   Patient Care Team:  Chelsea Maki DO as PCP - General  Malden Karly Shah DO as PCP - [facility] Empaneled Provider  Candice Desai DPM as Consulting Physician (Podiatry)  [facility] Readings from Last 3 Encounters:   20 121 lb 3.2 oz (55 kg)   10/30/19 121 lb (54.9 kg)   19 121 lb 9.6 oz (55.2 kg)     Vitals:    20 0816   BP: 130/80   Site: Right Upper Arm   Position: Sitting   Pulse: 62   Temp: 97.9 °F (36.6 °C)   TempSrc: Temporal   SpO2: 98%   Weight: 121 lb 3.2 oz (55 kg)   Height: 5' 2\" (1.575 m)     Body mass index is 22.17 kg/m². Based upon direct observation of the patient, evaluation of cognition reveals recent and remote memory intact. Resp:  Respirations are regular and unlabored. Respirations rate is normal Lungs are clear bilaterally  Cv: Rate is regular. Rhythm is regular.  S1 is normal. S2 is normal. Extremities: No edema of the BLE  Musculoskeletal: Walks with an age appropriate gait. Timed UP & Go test < 10 secs. Neuro: Displays comfort and cooperation during the encounter. Affect is normal.  Alert and Oriented x3. Able to name current President. Was able to recall 3 objects with accuracy. Score for Clock drawing = 2 (somewhat irregular due to low vision)      Patient's complete Health Risk Assessment and screening values have been reviewed and are found in Flowsheets. The following problems were reviewed today and where indicated follow up appointments were made and/or referrals ordered. Positive Risk Factor Screenings with Interventions:     Fall Risk:  Timed Up and Go Test > 12 seconds? (Complete if either Fall Risk answers are Yes): no  2 or more falls in past year?: (!) yes  Fall with injury in past year?: no  Fall Risk Interventions:    · Home safety tips provided    Health Habits/Nutrition:  Health Habits/Nutrition  Do you exercise for at least 20 minutes 2-3 times per week?: Yes  Have you lost any weight without trying in the past 3 months?: No  Do you eat fewer than 2 meals per day?: No  Have you seen a dentist within the past year?: (!) No  Body mass index is 22.17 kg/m². Health Habits/Nutrition Interventions:  · Dental exam overdue:  patient declines dental evaluation, has full dentures    Hearing/Vision:  No exam data present  Hearing/Vision  Do you or your family notice any trouble with your hearing?: (!) Yes  Do you have difficulty driving, watching TV, or doing any of your daily activities because of your eyesight?: (!) Yes  Have you had an eye exam within the past year?: Yes  Hearing/Vision Interventions:  · Hearing concerns:  patient declines any further evaluation/treatment for hearing issues, she had hearing aids and lost them  · Vision concerns:  see HPI.   She is being treated for MD    ADL:  ADLs  In the past 7 days, did you need help from others to perform any of the following everyday activities? Eating, dressing, grooming, bathing, toileting, or walking/balance?: None  In the past 7 days, did you need help from others to take care of any of the following? Laundry, housekeeping, banking/finances, shopping, telephone use, food preparation, transportation, or taking medications?: (!) Banking/Finances, Transportation  ADL Interventions:  · Patient declines any further evaluation/treatment for this issue  · son helps with these tasks    Personalized Preventive Plan   Current Health Maintenance Status  Immunization History   Administered Date(s) Administered    Influenza, High Dose (Fluzone 65 yrs and older) 11/26/2018    Influenza, Triv, inactivated, subunit, adjuvanted, IM (Fluad 65 yrs and older) 10/30/2019    Pneumococcal Polysaccharide ([id number]) 10/30/2019    Td, unspecified formulation 01/24/2011, 05/28/2013    Tdap (Boostrix, Adacel) 09/21/2016        Health Maintenance   Topic Date Due    Shingles Vaccine (1 of 2) 03/13/1982    Annual Wellness Visit (AWV)  05/29/2019    Potassium monitoring  08/27/2020    Creatinine monitoring  08/27/2020    DTaP/Tdap/Td vaccine (2 - Td) 09/21/2026    Flu vaccine  Completed    Pneumococcal 65+ years Vaccine  Completed    Hepatitis A vaccine  Aged Out    Hepatitis B vaccine  Aged Out    Hib vaccine  Aged Out    Meningococcal (ACWY) vaccine  Aged Out     Recommendations for [place] Due: see orders and patient instructions/AVS.  . Recommended screening schedule for the next 5-10 years is provided to the patient in written form: see Patient Instructions/AVS.    There are no diagnoses linked to this encounter.

## 2020-02-07 ENCOUNTER — OFFICE VISIT (OUTPATIENT)
Dept: FAMILY MEDICINE CLINIC | Age: 85
End: 2020-02-07
Payer: MEDICARE

## 2020-02-07 VITALS
BODY MASS INDEX: 22.28 KG/M2 | HEART RATE: 67 BPM | WEIGHT: 121.06 LBS | OXYGEN SATURATION: 97 % | HEIGHT: 62 IN | TEMPERATURE: 97.6 F | SYSTOLIC BLOOD PRESSURE: 126 MMHG | DIASTOLIC BLOOD PRESSURE: 66 MMHG

## 2020-02-07 PROBLEM — H35.30 MACULAR DEGENERATION: Status: ACTIVE | Noted: 2020-02-07

## 2020-02-07 PROCEDURE — 99213 OFFICE O/P EST LOW 20 MIN: CPT | Performed by: FAMILY MEDICINE

## 2020-02-07 ASSESSMENT — ENCOUNTER SYMPTOMS
NAUSEA: 0
SORE THROAT: 0
PHOTOPHOBIA: 0
TROUBLE SWALLOWING: 0
SHORTNESS OF BREATH: 0
BACK PAIN: 0
BLOOD IN STOOL: 0
VOMITING: 0
SINUS PAIN: 0
DIARRHEA: 0
EYE REDNESS: 0
EYE PAIN: 0
ABDOMINAL PAIN: 0
COUGH: 0
ALLERGIC/IMMUNOLOGIC NEGATIVE: 1
EYE DISCHARGE: 0
CHEST TIGHTNESS: 0

## 2020-02-07 NOTE — PROGRESS NOTES
20  Lilian Needs : 3/13/1932 Sex: female  Age: 80 y.o. Chief Complaint   Patient presents with    Hypertension       The patient is here for blood pressure check. Patient has been taking their medications as prescribed. No recent changes to their medications. No headache or visual disturbances. No dizziness or tinnitus. No chest pain, palpitations, or dyspnea. No nausea and vomiting. No numbness, tingling and or weakness to the extremities. No fevers or chills. No recent illness. Review of Systems   Constitutional: Negative for appetite change, fatigue and unexpected weight change. HENT: Negative for congestion, ear pain, hearing loss, sinus pain, sore throat and trouble swallowing. Eyes: Negative for photophobia, pain, discharge and redness. Respiratory: Negative for cough, chest tightness and shortness of breath. Cardiovascular: Negative for chest pain, palpitations and leg swelling. Gastrointestinal: Negative for abdominal pain, blood in stool, diarrhea, nausea and vomiting. Endocrine: Negative. Genitourinary: Negative for dysuria, flank pain, frequency and hematuria. Musculoskeletal: Negative for arthralgias, back pain, joint swelling and myalgias. Skin: Negative. Allergic/Immunologic: Negative. Neurological: Negative for dizziness, seizures, syncope, weakness, light-headedness, numbness and headaches. Hematological: Negative for adenopathy. Does not bruise/bleed easily. Psychiatric/Behavioral: Negative.           Current Outpatient Medications:     hydrOXYzine (ATARAX) 25 MG tablet, 1 or 2 tablets nightly, Disp: 60 tablet, Rfl: 5    lisinopril-hydrochlorothiazide (PRINZIDE;ZESTORETIC) 10-12.5 MG per tablet, Take 1 tablet by mouth daily, Disp: 30 tablet, Rfl: 5    memantine (NAMENDA) 10 MG tablet, Take 1 tablet by mouth 2 times daily, Disp: 60 tablet, Rfl: 5    vitamin D (CHOLECALCIFEROL) 1000 UNIT TABS tablet, Take by mouth, Disp: , Rfl:     aspirin 81 MG tablet, Take by mouth, Disp: , Rfl:     Vitamin E 100 UNITS TABS, Take 1 capsule by mouth daily. , Disp: , Rfl:     Multiple Vitamins-Minerals (THERAPEUTIC MULTIVITAMIN-MINERALS) tablet, Take 1 tablet by mouth daily. , Disp: , Rfl:   Allergies   Allergen Reactions    Biaxin [Clarithromycin] Other (See Comments)     Many years ago, does not recall reaction    Pcn [Penicillins] Rash       Past Medical History:   Diagnosis Date    Dementia ([address]     Epiretinal membrane     Hard of hearing     Hyperlipidemia     Hypertension     Macular degeneration     Osteoarthritis     Vitamin D deficiency      Past Surgical History:   Procedure Laterality Date    CATARACT REMOVAL WITH IMPLANT Bilateral     COLONOSCOPY  2010    COLPOSCOPY      DILATION AND CURETTAGE OF UTERUS      HEMORRHOID SURGERY  years ago    REPAIR RETINAL TEAR/HOLE Left     TONSILLECTOMY AND ADENOIDECTOMY      VITRECTOMY Left 2015     Family History   Problem Relation Age of Onset    Colon Cancer Mother     Hypertension Mother     Lung Cancer Father         Black Lung    Breast Cancer Sister     Lung Cancer Brother     Other Other         6 siblings, most  of infirmities of old age   Sharran Albert No Known Problems Sister     No Known Problems Sister     No Known Problems Brother     No Known Problems Brother      Social History     Socioeconomic History    Marital status:      Spouse name: Not on file    Number of children: Not on file    Years of education: Not on file    Highest education level: Not on file   Occupational History    Occupation: retired bakery   Social Needs    Financial resource strain: Not on file    Food insecurity:     Worry: Not on file     Inability: Not on file   Zuu Onlnine needs:     Medical: Not on file     Non-medical: Not on file   Tobacco Use    Smoking status: Never Smoker    Smokeless tobacco: Never Used   Substance and Sexual Activity    Alcohol use: No    Drug use:  No  Sexual activity: Not on file   Lifestyle    Physical activity:     Days per week: Not on file     Minutes per session: Not on file    Stress: Not on file   Relationships    Social connections:     Talks on phone: Not on file     Gets together: Not on file     Attends Yarsani service: Not on file     Active member of club or organization: Not on file     Attends meetings of clubs or organizations: Not on file     Relationship status: Not on file    Intimate partner violence:     Fear of current or ex partner: Not on file     Emotionally abused: Not on file     Physically abused: Not on file     Forced sexual activity: Not on file   Other Topics Concern    Not on file   Social History Narrative    Not on file       Vitals:    02/07/20 1000   BP: 126/66   Pulse: 67   Temp: 97.6 °F (36.4 °C)   TempSrc: Temporal   SpO2: 97%   Weight: 121 lb 1 oz (54.9 kg)   Height: 5' 2\" (1.575 m)       Physical Exam    Assessment and Plan:  [address] [place], [place] [address]on [place] was seen today for hypertension. Diagnoses and all orders for this visit:    Essential hypertension    Vascular dementia without behavioral disturbance ([facility] [address]    Macular degeneration of right eye, unspecified type        No follow-ups on file.       Seen By:  Adrianna Núñez DO

## 2020-03-17 ENCOUNTER — TELEPHONE (OUTPATIENT)
Dept: ADMINISTRATIVE | Age: 85
End: 2020-03-17

## 2020-03-17 NOTE — TELEPHONE ENCOUNTER
Pt has had runny nose for last 5 days, raspy throat, now she is chilling a bit; concerned, please advise pt's son Deborah Cruz

## 2020-04-01 ENCOUNTER — HOSPITAL ENCOUNTER (EMERGENCY)
Age: 85
Discharge: HOME | End: 2020-04-01
Attending: EMERGENCY MEDICINE
Payer: MEDICARE

## 2020-04-01 ENCOUNTER — APPOINTMENT (OUTPATIENT)
Dept: CT IMAGING | Age: 85
End: 2020-04-01
Payer: MEDICARE

## 2020-04-01 VITALS
DIASTOLIC BLOOD PRESSURE: 85 MMHG | TEMPERATURE: 97.7 F | RESPIRATION RATE: 18 BRPM | HEART RATE: 82 BPM | WEIGHT: 121 LBS | OXYGEN SATURATION: 96 % | HEIGHT: 62 IN | SYSTOLIC BLOOD PRESSURE: 168 MMHG | BODY MASS INDEX: 22.26 KG/M2

## 2020-04-01 PROCEDURE — 6370000000 HC RX 637 (ALT 250 FOR IP): Performed by: STUDENT IN AN ORGANIZED HEALTH CARE EDUCATION/TRAINING PROGRAM

## 2020-04-01 PROCEDURE — 72125 CT NECK SPINE W/O DYE: CPT

## 2020-04-01 PROCEDURE — 70450 CT HEAD/BRAIN W/O DYE: CPT

## 2020-04-01 PROCEDURE — 99283 EMERGENCY DEPT VISIT LOW MDM: CPT

## 2020-04-01 RX ORDER — ACETAMINOPHEN 325 MG/1
650 TABLET ORAL ONCE
Status: COMPLETED | OUTPATIENT
Start: 2020-04-01 | End: 2020-04-01

## 2020-04-01 RX ADMIN — ACETAMINOPHEN 650 MG: 325 TABLET ORAL at 16:13

## 2020-04-01 ASSESSMENT — ENCOUNTER SYMPTOMS
BACK PAIN: 0
CHEST TIGHTNESS: 0
COUGH: 0
ABDOMINAL PAIN: 0
SORE THROAT: 0
SHORTNESS OF BREATH: 0
BLOOD IN STOOL: 0
RHINORRHEA: 0
VOMITING: 0
DIARRHEA: 0
WHEEZING: 0
CONSTIPATION: 0
NAUSEA: 0

## 2020-04-01 ASSESSMENT — PAIN DESCRIPTION - ONSET: ONSET: SUDDEN

## 2020-04-01 ASSESSMENT — PAIN DESCRIPTION - ORIENTATION: ORIENTATION: RIGHT

## 2020-04-01 ASSESSMENT — PAIN SCALES - GENERAL
PAINLEVEL_OUTOF10: 2
PAINLEVEL_OUTOF10: 2

## 2020-04-01 ASSESSMENT — PAIN DESCRIPTION - PAIN TYPE: TYPE: ACUTE PAIN

## 2020-04-01 ASSESSMENT — PAIN DESCRIPTION - LOCATION: LOCATION: HEAD

## 2020-04-01 ASSESSMENT — PAIN DESCRIPTION - FREQUENCY: FREQUENCY: CONTINUOUS

## 2020-04-01 ASSESSMENT — PAIN DESCRIPTION - DESCRIPTORS: DESCRIPTORS: ACHING

## 2020-04-01 NOTE — ED PROVIDER NOTES
Patient is 43-year-old female with past medical history significant for vascular dementia, hypertension, who presents the emergency department for complaint of head injury. States that she was on a stepladder and stepped down but missed a step and fell backwards. States that she landed on her bottom. She has no pain with ambulation or pain in her hips. She is only complaining of right-sided head pain with a knot. No open wound. No midline cervical tenderness. She is alert and oriented. She has no other deficits. No loss of consciousness. Denies any chest pain, shortness of breath, abdominal pain, nausea/vomiting, fevers or recent illnesses. Patient states this was a mechanical fall. She just wanted to come in and make sure that everything was okay. Patient takes aspirin. Review of Systems   Constitutional: Negative for appetite change, diaphoresis and fever. HENT: Negative for congestion, rhinorrhea and sore throat. Eyes: Negative for visual disturbance. Respiratory: Negative for cough, chest tightness, shortness of breath and wheezing. Cardiovascular: Negative for chest pain, palpitations and leg swelling. Gastrointestinal: Negative for abdominal pain, blood in stool, constipation, diarrhea, nausea and vomiting. Endocrine: Negative for polyuria. Genitourinary: Negative for decreased urine volume, dysuria and vaginal discharge. Musculoskeletal: Negative for back pain, neck pain and neck stiffness. Skin: Negative for rash. Neurological: Negative for syncope, weakness, light-headedness and headaches. Hematological: Negative for adenopathy. Psychiatric/Behavioral: Negative for confusion. Physical Exam  Vitals signs and nursing note reviewed. Constitutional:       General: She is not in acute distress. Appearance: Normal appearance. She is normal weight. She is not ill-appearing, toxic-appearing or diaphoretic. HENT:      Head: Normocephalic.

## 2020-04-24 RX ORDER — LISINOPRIL AND HYDROCHLOROTHIAZIDE 12.5; 1 MG/1; MG/1
1 TABLET ORAL DAILY
Qty: 30 TABLET | Refills: 0 | Status: SHIPPED
Start: 2020-04-24 | End: 2020-05-08 | Stop reason: SDUPTHER

## 2020-05-08 ENCOUNTER — VIRTUAL VISIT (OUTPATIENT)
Dept: PRIMARY CARE CLINIC | Age: 85
End: 2020-05-08
Payer: MEDICARE

## 2020-05-08 VITALS — BODY MASS INDEX: 22.26 KG/M2 | HEIGHT: 62 IN | WEIGHT: 121 LBS

## 2020-05-08 PROCEDURE — 99213 OFFICE O/P EST LOW 20 MIN: CPT | Performed by: FAMILY MEDICINE

## 2020-05-08 RX ORDER — MEMANTINE HYDROCHLORIDE 10 MG/1
10 TABLET ORAL 2 TIMES DAILY
Qty: 60 TABLET | Refills: 5 | Status: SHIPPED
Start: 2020-05-08 | End: 2020-11-13 | Stop reason: SDUPTHER

## 2020-05-08 RX ORDER — HYDROXYZINE HYDROCHLORIDE 25 MG/1
TABLET, FILM COATED ORAL
Qty: 60 TABLET | Refills: 5 | Status: SHIPPED
Start: 2020-05-08 | End: 2020-11-13 | Stop reason: SDUPTHER

## 2020-05-08 RX ORDER — LISINOPRIL AND HYDROCHLOROTHIAZIDE 12.5; 1 MG/1; MG/1
1 TABLET ORAL DAILY
Qty: 30 TABLET | Refills: 5 | Status: SHIPPED
Start: 2020-05-08 | End: 2020-11-13 | Stop reason: SDUPTHER

## 2020-05-08 ASSESSMENT — ENCOUNTER SYMPTOMS
SINUS PAIN: 0
SHORTNESS OF BREATH: 0
VOMITING: 0
ALLERGIC/IMMUNOLOGIC NEGATIVE: 1
DIARRHEA: 0
BLOOD IN STOOL: 0
CHEST TIGHTNESS: 0
PHOTOPHOBIA: 0
NAUSEA: 0
SORE THROAT: 0
EYE PAIN: 0
EYE REDNESS: 0
BACK PAIN: 0
COUGH: 0
ABDOMINAL PAIN: 0
EYE DISCHARGE: 0
TROUBLE SWALLOWING: 0

## 2020-05-08 NOTE — PROGRESS NOTES
2020    TELEHEALTH EVALUATION -- Audio/Visual ([facility])    HPI: Recheck chronic medical problems    Mike Govind (:  3/13/1932) has requested an audio/video evaluation for the following concern(s):    Needs refills,  Doing well, denies any new complaints    Review of Systems   Constitutional: Negative for appetite change, fatigue and unexpected weight change. HENT: Negative for congestion, ear pain, hearing loss, sinus pain, sore throat and trouble swallowing. Eyes: Negative for photophobia, pain, discharge and redness. Respiratory: Negative for cough, chest tightness and shortness of breath. Cardiovascular: Negative for chest pain, palpitations and leg swelling. Gastrointestinal: Negative for abdominal pain, blood in stool, diarrhea, nausea and vomiting. Endocrine: Negative. Genitourinary: Negative for dysuria, flank pain, frequency and hematuria. Musculoskeletal: Negative for arthralgias, back pain, joint swelling and myalgias. Skin: Negative. Allergic/Immunologic: Negative. Neurological: Negative for dizziness, seizures, syncope, weakness, light-headedness, numbness and headaches. Hematological: Negative for adenopathy. Does not bruise/bleed easily. Psychiatric/Behavioral: Negative. Negative for behavioral problems, confusion and hallucinations. Prior to Visit Medications    Medication Sig Taking?  Authorizing Provider   hydrOXYzine (ATARAX) 25 MG tablet 1 or 2 tablets nightly Yes [place] JOSE L Corona DO   lisinopril-hydroCHLOROthiazide (PRINZIDE;ZESTORETIC) 10-12.5 MG per tablet Take 1 tablet by mouth daily Yes [place] JOSE L Corona DO   memantine (NAMENDA) 10 MG tablet Take 1 tablet by mouth 2 times daily Yes [place] JOSE L Corona DO   vitamin D (CHOLECALCIFEROL) 1000 UNIT TABS tablet Take by mouth Yes Historical Provider, MD   aspirin 81 MG tablet Take by mouth Yes Historical Provider, MD   Multiple Vitamins-Minerals (THERAPEUTIC lisinopril-hydroCHLOROthiazide (PRINZIDE;ZESTORETIC) 10-12.5 MG per tablet; Take 1 tablet by mouth daily  Dispense: 30 tablet; Refill: 5    3. Vascular dementia without behavioral disturbance (HCC)  - memantine (NAMENDA) 10 MG tablet; Take 1 tablet by mouth 2 times daily  Dispense: 60 tablet; Refill: 5      Return in about 3 months (around 8/8/2020). Lesly Kenny is a 80 y.o. female being evaluated by a Virtual Visit (video visit) encounter to address concerns as mentioned above. A caregiver was present when appropriate. Due to this being a TeleHealth encounter (During [facility] public health emergency), evaluation of the following organ systems was limited: Vitals/Constitutional/EENT/Resp/CV/GI//MS/Neuro/Skin/Heme-Lymph-Imm. Pursuant to the emergency declaration under the [address] [place] [address] and the [employer] Act, this Virtual Visit was conducted with patient's (and/or legal guardian's) consent, to reduce the patient's risk of exposure to COVID-19 and provide necessary medical care. The patient (and/or legal guardian) has also been advised to contact this office for worsening conditions or problems, and seek emergency medical treatment and/or call 911 if deemed necessary. Patient identification was verified at the start of the visit: Yes    Total time spent on this encounter: Not billed by time    Services were provided through a video synchronous discussion virtually to substitute for in-person clinic visit. Patient and provider were located at their individual homes. --Sanam Alonzo DO on 5/8/2020 at 8:40 AM    An electronic signature was used to authenticate this note.

## 2020-08-14 ENCOUNTER — OFFICE VISIT (OUTPATIENT)
Dept: FAMILY MEDICINE CLINIC | Age: 85
End: 2020-08-14
Payer: MEDICARE

## 2020-08-14 ENCOUNTER — HOSPITAL ENCOUNTER (OUTPATIENT)
Age: 85
Discharge: HOME | End: 2020-08-16
Payer: MEDICARE

## 2020-08-14 VITALS
OXYGEN SATURATION: 98 % | WEIGHT: 117 LBS | RESPIRATION RATE: 16 BRPM | TEMPERATURE: 97.2 F | SYSTOLIC BLOOD PRESSURE: 120 MMHG | HEART RATE: 61 BPM | BODY MASS INDEX: 21.53 KG/M2 | DIASTOLIC BLOOD PRESSURE: 72 MMHG | HEIGHT: 62 IN

## 2020-08-14 LAB
ALBUMIN SERPL-MCNC: 4.2 G/DL (ref 3.5–5.2)
ALP BLD-CCNC: 93 U/L (ref 35–104)
ALT SERPL-CCNC: 23 U/L (ref 0–32)
ANION GAP SERPL CALCULATED.3IONS-SCNC: 12 MMOL/L (ref 7–16)
AST SERPL-CCNC: 38 U/L (ref 0–31)
BASOPHILS ABSOLUTE: 0.02 E9/L (ref 0–0.2)
BASOPHILS RELATIVE PERCENT: 0.5 % (ref 0–2)
BILIRUB SERPL-MCNC: 0.6 MG/DL (ref 0–1.2)
BILIRUBIN URINE: NEGATIVE
BLOOD, URINE: NEGATIVE
BUN BLDV-MCNC: 11 MG/DL (ref 8–23)
CALCIUM SERPL-MCNC: 9.6 MG/DL (ref 8.6–10.2)
CHLORIDE BLD-SCNC: 92 MMOL/L (ref 98–107)
CHOLESTEROL, TOTAL: 213 MG/DL (ref 0–199)
CLARITY: CLEAR
CO2: 28 MMOL/L (ref 22–29)
COLOR: YELLOW
CREAT SERPL-MCNC: 0.8 MG/DL (ref 0.5–1)
EOSINOPHILS ABSOLUTE: 0.14 E9/L (ref 0.05–0.5)
EOSINOPHILS RELATIVE PERCENT: 3.7 % (ref 0–6)
GFR AFRICAN AMERICAN: >60
GFR NON-AFRICAN AMERICAN: >60 ML/MIN/1.73
GLUCOSE FASTING: 91 MG/DL (ref 74–99)
GLUCOSE URINE: NEGATIVE MG/DL
HCT VFR BLD CALC: 39.7 % (ref 34–48)
HDLC SERPL-MCNC: 92 MG/DL
HEMOGLOBIN: 13.1 G/DL (ref 11.5–15.5)
IMMATURE GRANULOCYTES #: 0.01 E9/L
IMMATURE GRANULOCYTES %: 0.3 % (ref 0–5)
KETONES, URINE: NEGATIVE MG/DL
LDL CHOLESTEROL CALCULATED: 108 MG/DL (ref 0–99)
LEUKOCYTE ESTERASE, URINE: NEGATIVE
LYMPHOCYTES ABSOLUTE: 0.88 E9/L (ref 1.5–4)
LYMPHOCYTES RELATIVE PERCENT: 23.3 % (ref 20–42)
MCH RBC QN AUTO: 29.6 PG (ref 26–35)
MCHC RBC AUTO-ENTMCNC: 33 % (ref 32–34.5)
MCV RBC AUTO: 89.6 FL (ref 80–99.9)
MONOCYTES ABSOLUTE: 0.39 E9/L (ref 0.1–0.95)
MONOCYTES RELATIVE PERCENT: 10.3 % (ref 2–12)
NEUTROPHILS ABSOLUTE: 2.33 E9/L (ref 1.8–7.3)
NEUTROPHILS RELATIVE PERCENT: 61.9 % (ref 43–80)
NITRITE, URINE: NEGATIVE
PDW BLD-RTO: 13.9 FL (ref 11.5–15)
PH UA: 7.5 (ref 5–9)
PLATELET # BLD: 196 E9/L (ref 130–450)
PMV BLD AUTO: 9.6 FL (ref 7–12)
POTASSIUM SERPL-SCNC: 5.1 MMOL/L (ref 3.5–5)
PROTEIN UA: NEGATIVE MG/DL
RBC # BLD: 4.43 E12/L (ref 3.5–5.5)
SODIUM BLD-SCNC: 132 MMOL/L (ref 132–146)
SPECIFIC GRAVITY UA: 1.01 (ref 1–1.03)
TOTAL PROTEIN: 6.3 G/DL (ref 6.4–8.3)
TRIGL SERPL-MCNC: 65 MG/DL (ref 0–149)
TSH SERPL DL<=0.05 MIU/L-ACNC: 1.41 UIU/ML (ref 0.27–4.2)
UROBILINOGEN, URINE: 0.2 E.U./DL
VITAMIN D 25-HYDROXY: 50 NG/ML (ref 30–100)
VLDLC SERPL CALC-MCNC: 13 MG/DL
WBC # BLD: 3.8 E9/L (ref 4.5–11.5)

## 2020-08-14 PROCEDURE — 80053 COMPREHEN METABOLIC PANEL: CPT

## 2020-08-14 PROCEDURE — 81003 URINALYSIS AUTO W/O SCOPE: CPT

## 2020-08-14 PROCEDURE — 99214 OFFICE O/P EST MOD 30 MIN: CPT | Performed by: FAMILY MEDICINE

## 2020-08-14 PROCEDURE — 84443 ASSAY THYROID STIM HORMONE: CPT

## 2020-08-14 PROCEDURE — 82306 VITAMIN D 25 HYDROXY: CPT

## 2020-08-14 PROCEDURE — 85025 COMPLETE CBC W/AUTO DIFF WBC: CPT

## 2020-08-14 PROCEDURE — 80061 LIPID PANEL: CPT

## 2020-08-14 PROCEDURE — 36415 COLL VENOUS BLD VENIPUNCTURE: CPT

## 2020-08-14 ASSESSMENT — ENCOUNTER SYMPTOMS
COUGH: 0
SINUS PAIN: 0
EYE PAIN: 0
BACK PAIN: 0
SORE THROAT: 0
NAUSEA: 0
EYE DISCHARGE: 0
BLOOD IN STOOL: 0
TROUBLE SWALLOWING: 0
PHOTOPHOBIA: 0
SHORTNESS OF BREATH: 0
VOMITING: 0
ALLERGIC/IMMUNOLOGIC NEGATIVE: 1
DIARRHEA: 0
EYE REDNESS: 0
ABDOMINAL PAIN: 0
CHEST TIGHTNESS: 0

## 2020-08-14 NOTE — PROGRESS NOTES
20  Gisela Mayer : 3/13/1932 Sex: female  Age: 80 y.o. Chief Complaint   Patient presents with    Hypertension       The patient is here for blood pressure check. Patient has been taking their medications as prescribed. No recent changes to their medications. No headache or visual disturbances. No dizziness or tinnitus. No chest pain, palpitations, or dyspnea. No nausea and vomiting. No numbness, tingling and or weakness to the extremities. No fevers or chills. No recent illness. Dementia stable, remains status qou      Review of Systems   Constitutional: Negative for appetite change, fatigue and unexpected weight change. HENT: Negative for congestion, ear pain, hearing loss, sinus pain, sore throat and trouble swallowing. Eyes: Negative for photophobia, pain, discharge and redness. Respiratory: Negative for cough, chest tightness and shortness of breath. Cardiovascular: Negative for chest pain, palpitations and leg swelling. Gastrointestinal: Negative for abdominal pain, blood in stool, diarrhea, nausea and vomiting. Endocrine: Negative. Genitourinary: Negative for dysuria, flank pain, frequency and hematuria. Musculoskeletal: Negative for arthralgias, back pain, joint swelling and myalgias. Skin: Negative. Allergic/Immunologic: Negative. Neurological: Negative for dizziness, seizures, syncope, weakness, light-headedness, numbness and headaches. Hematological: Negative for adenopathy. Does not bruise/bleed easily. Psychiatric/Behavioral: Positive for decreased concentration. Negative for behavioral problems. The patient is nervous/anxious.           Current Outpatient Medications:     hydrOXYzine (ATARAX) 25 MG tablet, 1 or 2 tablets nightly, Disp: 60 tablet, Rfl: 5    lisinopril-hydroCHLOROthiazide (PRINZIDE;ZESTORETIC) 10-12.5 MG per tablet, Take 1 tablet by mouth daily, Disp: 30 tablet, Rfl: 5    memantine (NAMENDA) 10 MG tablet, Take 1 tablet by mouth 2 times daily, Disp: 60 tablet, Rfl: 5    vitamin D (CHOLECALCIFEROL) 1000 UNIT TABS tablet, Take by mouth, Disp: , Rfl:     aspirin 81 MG tablet, Take by mouth, Disp: , Rfl:     Vitamin E 100 UNITS TABS, Take 1 capsule by mouth daily. , Disp: , Rfl:     Multiple Vitamins-Minerals (THERAPEUTIC MULTIVITAMIN-MINERALS) tablet, Take 1 tablet by mouth daily. , Disp: , Rfl:   Allergies   Allergen Reactions    Biaxin [Clarithromycin] Other (See Comments)     Many years ago, does not recall reaction    Pcn [Penicillins] Rash       Past Medical History:   Diagnosis Date    Dementia ([facility] [address]     Epiretinal membrane     Hard of hearing     Hyperlipidemia     Hypertension     Macular degeneration     Osteoarthritis     Vitamin D deficiency      Past Surgical History:   Procedure Laterality Date    CATARACT REMOVAL WITH IMPLANT Bilateral     COLONOSCOPY  2010    COLPOSCOPY      DILATION AND CURETTAGE OF UTERUS      HEMORRHOID SURGERY  years ago    REPAIR RETINAL TEAR/HOLE Left     TONSILLECTOMY AND ADENOIDECTOMY      VITRECTOMY Left 2015     Family History   Problem Relation Age of Onset    Colon Cancer Mother     Hypertension Mother     Lung Cancer Father         Black Lung    Breast Cancer Sister     Lung Cancer Brother     Other Other         6 siblings, most  of infirmities of old age   Nkechi Oviedo No Known Problems Sister     No Known Problems Sister     No Known Problems Brother     No Known Problems Brother      Social History     Socioeconomic History    Marital status:       Spouse name: Not on file    Number of children: Not on file    Years of education: Not on file    Highest education level: Not on file   Occupational History    Occupation: retired bakery   Social Needs    Financial resource strain: Not on file    Food insecurity     Worry: Not on file     Inability: Not on file   [employer] needs     Medical: Not on file     Non-medical: Not on file   Tobacco Use    Smoking status: Never Smoker    Smokeless tobacco: Never Used   Substance and Sexual Activity    Alcohol use: No    Drug use: No    Sexual activity: Not on file   Lifestyle    Physical activity     Days per week: Not on file     Minutes per session: Not on file    Stress: Not on file   Relationships    Social connections     Talks on phone: Not on file     Gets together: Not on file     Attends Confucianist service: Not on file     Active member of club or organization: Not on file     Attends meetings of clubs or organizations: Not on file     Relationship status: Not on file    Intimate partner violence     Fear of current or ex partner: Not on file     Emotionally abused: Not on file     Physically abused: Not on file     Forced sexual activity: Not on file   Other Topics Concern    Not on file   Social History Narrative    Not on file       Vitals:    08/14/20 0820   BP: 120/72   Pulse: 61   Resp: 16   Temp: 97.2 °F (36.2 °C)   TempSrc: Temporal   SpO2: 98%   Weight: 117 lb (53.1 kg)   Height: 5' 2\" (1.575 m)       Physical Exam  Vitals signs and nursing note reviewed. Constitutional:       Appearance: She is well-developed. HENT:      Head: Atraumatic. Right Ear: External ear normal.      Left Ear: External ear normal.      Nose: Nose normal.      Mouth/Throat:      Pharynx: No oropharyngeal exudate. Eyes:      Conjunctiva/sclera: Conjunctivae normal.      Pupils: Pupils are equal, round, and reactive to light. Neck:      Musculoskeletal: Normal range of motion and neck supple. Thyroid: No thyromegaly. Trachea: No tracheal deviation. Cardiovascular:      Rate and Rhythm: Normal rate and regular rhythm. Heart sounds: No murmur. No friction rub. No gallop. Pulmonary:      Effort: Pulmonary effort is normal. No respiratory distress. Breath sounds: Normal breath sounds. Abdominal:      General: Bowel sounds are normal.      Palpations: Abdomen is soft.

## 2020-11-13 ENCOUNTER — OFFICE VISIT (OUTPATIENT)
Dept: FAMILY MEDICINE CLINIC | Age: 85
End: 2020-11-13
Payer: MEDICARE

## 2020-11-13 VITALS
DIASTOLIC BLOOD PRESSURE: 78 MMHG | SYSTOLIC BLOOD PRESSURE: 122 MMHG | BODY MASS INDEX: 21.35 KG/M2 | WEIGHT: 116 LBS | TEMPERATURE: 97.2 F | HEIGHT: 62 IN | RESPIRATION RATE: 16 BRPM

## 2020-11-13 PROCEDURE — G0008 ADMIN INFLUENZA VIRUS VAC: HCPCS | Performed by: FAMILY MEDICINE

## 2020-11-13 PROCEDURE — 90694 VACC AIIV4 NO PRSRV 0.5ML IM: CPT | Performed by: FAMILY MEDICINE

## 2020-11-13 PROCEDURE — 99213 OFFICE O/P EST LOW 20 MIN: CPT | Performed by: FAMILY MEDICINE

## 2020-11-13 RX ORDER — LISINOPRIL AND HYDROCHLOROTHIAZIDE 12.5; 1 MG/1; MG/1
1 TABLET ORAL DAILY
Qty: 90 TABLET | Refills: 1 | Status: SHIPPED
Start: 2020-11-13 | End: 2021-05-14 | Stop reason: SDUPTHER

## 2020-11-13 RX ORDER — HYDROXYZINE HYDROCHLORIDE 25 MG/1
TABLET, FILM COATED ORAL
Qty: 180 TABLET | Refills: 1 | Status: SHIPPED
Start: 2020-11-13 | End: 2021-05-14 | Stop reason: SDUPTHER

## 2020-11-13 RX ORDER — MEMANTINE HYDROCHLORIDE 10 MG/1
10 TABLET ORAL 2 TIMES DAILY
Qty: 180 TABLET | Refills: 1 | Status: SHIPPED
Start: 2020-11-13 | End: 2021-05-14 | Stop reason: SDUPTHER

## 2020-11-13 ASSESSMENT — ENCOUNTER SYMPTOMS
ABDOMINAL PAIN: 0
BLOOD IN STOOL: 0
EYE PAIN: 0
EYE DISCHARGE: 0
CHEST TIGHTNESS: 0
TROUBLE SWALLOWING: 0
ALLERGIC/IMMUNOLOGIC NEGATIVE: 1
BACK PAIN: 0
EYE REDNESS: 0
SORE THROAT: 0
SINUS PAIN: 0
VOMITING: 0
COUGH: 0
DIARRHEA: 0
NAUSEA: 0
PHOTOPHOBIA: 0
SHORTNESS OF BREATH: 0

## 2020-11-13 NOTE — PROGRESS NOTES
20  Irene Workman : 3/13/1932 Sex: female  Age: 80 y.o. Chief Complaint   Patient presents with    Hypertension    Flu Vaccine       The patient is here for blood pressure check. Patient has been taking their medications as prescribed. No recent changes to their medications. No headache or visual disturbances. No dizziness or tinnitus. No chest pain, palpitations, or dyspnea. No nausea and vomiting. No numbness, tingling and or weakness to the extremities. No fevers or chills. No recent illness. Review of Systems   Constitutional: Negative for appetite change, fatigue and unexpected weight change. HENT: Negative for congestion, ear pain, hearing loss, sinus pain, sore throat and trouble swallowing. Eyes: Negative for photophobia, pain, discharge and redness. Respiratory: Negative for cough, chest tightness and shortness of breath. Cardiovascular: Negative for chest pain, palpitations and leg swelling. Gastrointestinal: Negative for abdominal pain, blood in stool, diarrhea, nausea and vomiting. Endocrine: Negative. Genitourinary: Negative for dysuria, flank pain, frequency and hematuria. Musculoskeletal: Negative for arthralgias, back pain, joint swelling and myalgias. Skin: Negative. Allergic/Immunologic: Negative. Neurological: Negative for dizziness, seizures, syncope, weakness, light-headedness, numbness and headaches. Hematological: Negative for adenopathy. Does not bruise/bleed easily. Psychiatric/Behavioral: Negative.           Current Outpatient Medications:     hydrOXYzine (ATARAX) 25 MG tablet, 1 or 2 tablets nightly, Disp: 180 tablet, Rfl: 1    lisinopril-hydroCHLOROthiazide (PRINZIDE;ZESTORETIC) 10-12.5 MG per tablet, Take 1 tablet by mouth daily, Disp: 90 tablet, Rfl: 1    memantine (NAMENDA) 10 MG tablet, Take 1 tablet by mouth 2 times daily, Disp: 180 tablet, Rfl: 1    vitamin D (CHOLECALCIFEROL) 1000 UNIT TABS tablet, Take by mouth, Disp: , Rfl:     aspirin 81 MG tablet, Take by mouth, Disp: , Rfl:     Vitamin E 100 UNITS TABS, Take 1 capsule by mouth daily. , Disp: , Rfl:     Multiple Vitamins-Minerals (THERAPEUTIC MULTIVITAMIN-MINERALS) tablet, Take 1 tablet by mouth daily. , Disp: , Rfl:   Allergies   Allergen Reactions    Biaxin [Clarithromycin] Other (See Comments)     Many years ago, does not recall reaction    Pcn [Penicillins] Rash       Past Medical History:   Diagnosis Date    Dementia ([facility] [address]     Epiretinal membrane     Hard of hearing     Hyperlipidemia     Hypertension     Macular degeneration     Osteoarthritis     Vitamin D deficiency      Past Surgical History:   Procedure Laterality Date    CATARACT REMOVAL WITH IMPLANT Bilateral     COLONOSCOPY  2010    COLPOSCOPY      DILATION AND CURETTAGE OF UTERUS      HEMORRHOID SURGERY  years ago    REPAIR RETINAL TEAR/HOLE Left     TONSILLECTOMY AND ADENOIDECTOMY      VITRECTOMY Left 2015     Family History   Problem Relation Age of Onset    Colon Cancer Mother     Hypertension Mother     Lung Cancer Father         Black Lung    Breast Cancer Sister     Lung Cancer Brother     Other Other         6 siblings, most  of infirmities of old age   Margaret Stabs No Known Problems Sister     No Known Problems Sister     No Known Problems Brother     No Known Problems Brother      Social History     Socioeconomic History    Marital status:      Spouse name: Not on file    Number of children: Not on file    Years of education: Not on file    Highest education level: Not on file   Occupational History    Occupation: retired bakery   Social Needs    Financial resource strain: Not on file    Food insecurity     Worry: Not on file     Inability: Not on file   [employer] needs     Medical: Not on file     Non-medical: Not on file   Tobacco Use    Smoking status: Never Smoker    Smokeless tobacco: Never Used   Substance and Sexual Activity    Alcohol use:  No  Drug use: No    Sexual activity: Not on file   Lifestyle    Physical activity     Days per week: Not on file     Minutes per session: Not on file    Stress: Not on file   Relationships    Social connections     Talks on phone: Not on file     Gets together: Not on file     Attends Judaism service: Not on file     Active member of club or organization: Not on file     Attends meetings of clubs or organizations: Not on file     Relationship status: Not on file    Intimate partner violence     Fear of current or ex partner: Not on file     Emotionally abused: Not on file     Physically abused: Not on file     Forced sexual activity: Not on file   Other Topics Concern    Not on file   Social History Narrative    Not on file       Vitals:    11/13/20 0812   BP: 122/78   Resp: 16   Temp: 97.2 °F (36.2 °C)   TempSrc: Temporal   Weight: 116 lb (52.6 kg)   Height: 5' 2\" (1.575 m)       Physical Exam  Vitals signs and nursing note reviewed. Constitutional:       Appearance: She is well-developed. HENT:      Head: Atraumatic. Right Ear: External ear normal.      Left Ear: External ear normal.      Nose: Nose normal.      Mouth/Throat:      Pharynx: No oropharyngeal exudate. Eyes:      Conjunctiva/sclera: Conjunctivae normal.      Pupils: Pupils are equal, round, and reactive to light. Neck:      Musculoskeletal: Normal range of motion and neck supple. Thyroid: No thyromegaly. Trachea: No tracheal deviation. Cardiovascular:      Rate and Rhythm: Normal rate and regular rhythm. Heart sounds: No murmur. No friction rub. No gallop. Pulmonary:      Effort: Pulmonary effort is normal. No respiratory distress. Breath sounds: Normal breath sounds. Abdominal:      General: Bowel sounds are normal.      Palpations: Abdomen is soft. Musculoskeletal: Normal range of motion. General: No tenderness or deformity. Lymphadenopathy:      Cervical: No cervical adenopathy.    Skin:

## 2020-11-13 NOTE — LETTER
[id number] [id number]  Phone: [phone]  Fax: 007 [facility],         November 13, 2020     Patient: Colin Escobar   YOB: 1932   Date of Visit: 11/13/2020       To Whom It May Concern: It is my medical opinion that Colin Escobar is healthy and can continue treatment with eye doctor at this time. If you have any questions or concerns, please don't hesitate to call.     Sincerely,        Geovanna Veliz DO

## 2021-02-19 ENCOUNTER — OFFICE VISIT (OUTPATIENT)
Dept: FAMILY MEDICINE CLINIC | Age: 86
End: 2021-02-19
Payer: MEDICARE

## 2021-02-19 VITALS
OXYGEN SATURATION: 98 % | SYSTOLIC BLOOD PRESSURE: 126 MMHG | TEMPERATURE: 97.2 F | BODY MASS INDEX: 21.71 KG/M2 | HEIGHT: 62 IN | WEIGHT: 118 LBS | DIASTOLIC BLOOD PRESSURE: 72 MMHG | RESPIRATION RATE: 17 BRPM | HEART RATE: 71 BPM

## 2021-02-19 VITALS
RESPIRATION RATE: 17 BRPM | WEIGHT: 118 LBS | TEMPERATURE: 97.2 F | BODY MASS INDEX: 21.71 KG/M2 | DIASTOLIC BLOOD PRESSURE: 72 MMHG | SYSTOLIC BLOOD PRESSURE: 126 MMHG | OXYGEN SATURATION: 98 % | HEIGHT: 62 IN | HEART RATE: 71 BPM

## 2021-02-19 DIAGNOSIS — F01.50 VASCULAR DEMENTIA WITHOUT BEHAVIORAL DISTURBANCE (HCC): ICD-10-CM

## 2021-02-19 DIAGNOSIS — M15.9 PRIMARY OSTEOARTHRITIS INVOLVING MULTIPLE JOINTS: ICD-10-CM

## 2021-02-19 DIAGNOSIS — I10 ESSENTIAL HYPERTENSION: Primary | ICD-10-CM

## 2021-02-19 DIAGNOSIS — Z00.00 ROUTINE GENERAL MEDICAL EXAMINATION AT A HEALTH CARE FACILITY: Primary | ICD-10-CM

## 2021-02-19 PROCEDURE — G0439 PPPS, SUBSEQ VISIT: HCPCS | Performed by: FAMILY MEDICINE

## 2021-02-19 PROCEDURE — 99213 OFFICE O/P EST LOW 20 MIN: CPT | Performed by: FAMILY MEDICINE

## 2021-02-19 ASSESSMENT — PATIENT HEALTH QUESTIONNAIRE - PHQ9
SUM OF ALL RESPONSES TO PHQ QUESTIONS 1-9: 0
1. LITTLE INTEREST OR PLEASURE IN DOING THINGS: 0
2. FEELING DOWN, DEPRESSED OR HOPELESS: 0
SUM OF ALL RESPONSES TO PHQ QUESTIONS 1-9: 0

## 2021-02-19 ASSESSMENT — ENCOUNTER SYMPTOMS
TROUBLE SWALLOWING: 0
SORE THROAT: 0
PHOTOPHOBIA: 0
VOMITING: 0
ALLERGIC/IMMUNOLOGIC NEGATIVE: 1
BLOOD IN STOOL: 0
CHEST TIGHTNESS: 0
DIARRHEA: 0
SHORTNESS OF BREATH: 0
EYE REDNESS: 0
EYE DISCHARGE: 0
COUGH: 0
ABDOMINAL PAIN: 0
EYE PAIN: 0
NAUSEA: 0
BACK PAIN: 0
SINUS PAIN: 0

## 2021-02-19 NOTE — PROGRESS NOTES
21  Jeffery Mckeon : 3/13/1932 Sex: female  Age: 80 y.o. Assessment and Plan:  [place] was seen today for hypertension. Diagnoses and all orders for this visit:    Essential hypertension  Adequate control at this time. Vascular dementia without behavioral disturbance ([address]  This remains stable on her current dose of Aricept. Primary osteoarthritis involving multiple joints  Still moving around with more pain. Return in about 3 months (around 2021). Chief Complaint   Patient presents with    Hypertension       Patient is here for recheck visit. She has been doing well, no new complaints. He is tolerating her medications without side effects. Her baseline mental status remains stable, longer she is in your surroundings she functions well. She did receive 2 doses of the PodPoster vaccine. The patient also for blood pressure check. Patient has been taking their medications as prescribed. No recent changes to their medications. No headache or visual disturbances. No dizziness or tinnitus. No chest pain, palpitations, or dyspnea. No nausea and vomiting. No numbness, tingling and or weakness to the extremities. No fevers or chills. No recent illness. Review of Systems   Constitutional: Negative for appetite change, fatigue and unexpected weight change. HENT: Negative for congestion, ear pain, hearing loss, sinus pain, sore throat and trouble swallowing. Eyes: Negative for photophobia, pain, discharge and redness. Respiratory: Negative for cough, chest tightness and shortness of breath. Cardiovascular: Negative for chest pain, palpitations and leg swelling. Gastrointestinal: Negative for abdominal pain, blood in stool, diarrhea, nausea and vomiting. Endocrine: Negative. Genitourinary: Negative for dysuria, flank pain, frequency and hematuria. Musculoskeletal: Negative for arthralgias, back pain, joint swelling and myalgias. Skin: Negative. Allergic/Immunologic: Negative. Neurological: Negative for dizziness, seizures, syncope, weakness, light-headedness, numbness and headaches. Hematological: Negative for adenopathy. Does not bruise/bleed easily. Psychiatric/Behavioral: Negative. Current Outpatient Medications:     hydrOXYzine (ATARAX) 25 MG tablet, 1 or 2 tablets nightly, Disp: 180 tablet, Rfl: 1    lisinopril-hydroCHLOROthiazide (PRINZIDE;ZESTORETIC) 10-12.5 MG per tablet, Take 1 tablet by mouth daily, Disp: 90 tablet, Rfl: 1    memantine (NAMENDA) 10 MG tablet, Take 1 tablet by mouth 2 times daily, Disp: 180 tablet, Rfl: 1    vitamin D (CHOLECALCIFEROL) 1000 UNIT TABS tablet, Take by mouth, Disp: , Rfl:     aspirin 81 MG tablet, Take by mouth, Disp: , Rfl:     Vitamin E 100 UNITS TABS, Take 1 capsule by mouth daily. , Disp: , Rfl:     Multiple Vitamins-Minerals (THERAPEUTIC MULTIVITAMIN-MINERALS) tablet, Take 1 tablet by mouth daily. , Disp: , Rfl:   Allergies   Allergen Reactions    Biaxin [Clarithromycin] Other (See Comments)     Many years ago, does not recall reaction    Pcn [Penicillins] Rash       Past Medical History:   Diagnosis Date    Dementia [facility] [address]     Epiretinal membrane     Hard of hearing     Hyperlipidemia     Hypertension     Macular degeneration     Osteoarthritis     Vitamin D deficiency      Past Surgical History:   Procedure Laterality Date    CATARACT REMOVAL WITH IMPLANT Bilateral     COLONOSCOPY  2010    COLPOSCOPY      DILATION AND CURETTAGE OF UTERUS      HEMORRHOID SURGERY  years ago    REPAIR RETINAL TEAR/HOLE Left     TONSILLECTOMY AND ADENOIDECTOMY      VITRECTOMY Left 2015     Family History   Problem Relation Age of Onset    Colon Cancer Mother     Hypertension Mother     Lung Cancer Father         Black Lung    Breast Cancer Sister     Lung Cancer Brother     Other Other         6 siblings, most  of infirmities of old age   Ro [place] No Known Problems Sister    Ro [place] No Known Problems Sister     No Known Problems Brother     No Known Problems Brother      Social History     Socioeconomic History    Marital status:      Spouse name: Not on file    Number of children: Not on file    Years of education: Not on file    Highest education level: Not on file   Occupational History    Occupation: retired bakery   Social Needs    Financial resource strain: Not on file    Food insecurity     Worry: Not on file     Inability: Not on file   [employer] needs     Medical: Not on file     Non-medical: Not on file   Tobacco Use    Smoking status: Never Smoker    Smokeless tobacco: Never Used   Substance and Sexual Activity    Alcohol use: No    Drug use: No    Sexual activity: Not on file   Lifestyle    Physical activity     Days per week: Not on file     Minutes per session: Not on file    Stress: Not on file   Relationships    Social connections     Talks on phone: Not on file     Gets together: Not on file     Attends Bahai service: Not on file     Active member of club or organization: Not on file     Attends meetings of clubs or organizations: Not on file     Relationship status: Not on file    Intimate partner violence     Fear of current or ex partner: Not on file     Emotionally abused: Not on file     Physically abused: Not on file     Forced sexual activity: Not on file   Other Topics Concern    Not on file   Social History Narrative    Not on file       Vitals:    02/19/21 0814 02/19/21 0826   BP: (!) 150/80 126/72   Pulse: 71    Resp: 17    Temp: 97.2 °F (36.2 °C)    TempSrc: Temporal    SpO2: 98%    Weight: 118 lb (53.5 kg)    Height: 5' 2\" (1.575 m)        Physical Exam  Vitals signs and nursing note reviewed. Constitutional:       Appearance: She is well-developed. HENT:      Head: Atraumatic.       Right Ear: External ear normal.      Left Ear: External ear normal.      Nose: Nose normal.      Mouth/Throat:      Pharynx: No oropharyngeal exudate. Eyes:      Conjunctiva/sclera: Conjunctivae normal.      Pupils: Pupils are equal, round, and reactive to light. Neck:      Musculoskeletal: Normal range of motion and neck supple. Thyroid: No thyromegaly. Trachea: No tracheal deviation. Cardiovascular:      Rate and Rhythm: Normal rate and regular rhythm. Heart sounds: No murmur. No friction rub. No gallop. Pulmonary:      Effort: Pulmonary effort is normal. No respiratory distress. Breath sounds: Normal breath sounds. Abdominal:      General: Bowel sounds are normal.      Palpations: Abdomen is soft. Musculoskeletal: Normal range of motion. General: No tenderness or deformity. Lymphadenopathy:      Cervical: No cervical adenopathy. Skin:     General: Skin is warm and dry. Capillary Refill: Capillary refill takes less than 2 seconds. Findings: No rash. Neurological:      Mental Status: She is alert and oriented to person, place, and time. Sensory: No sensory deficit. Motor: No abnormal muscle tone.       Coordination: Coordination normal.      Deep Tendon Reflexes: Reflexes normal.             Seen By:  Christine Malloy DO

## 2021-02-19 NOTE — PROGRESS NOTES
21  Constance Carrion : 3/13/1932 Sex: female  Age: 80 y.o. Assessment and Plan:  There are no diagnoses linked to this encounter. No follow-ups on file. No chief complaint on file. HPI    Review of Systems      Current Outpatient Medications:     hydrOXYzine (ATARAX) 25 MG tablet, 1 or 2 tablets nightly, Disp: 180 tablet, Rfl: 1    lisinopril-hydroCHLOROthiazide (PRINZIDE;ZESTORETIC) 10-12.5 MG per tablet, Take 1 tablet by mouth daily, Disp: 90 tablet, Rfl: 1    memantine (NAMENDA) 10 MG tablet, Take 1 tablet by mouth 2 times daily, Disp: 180 tablet, Rfl: 1    vitamin D (CHOLECALCIFEROL) 1000 UNIT TABS tablet, Take by mouth, Disp: , Rfl:     aspirin 81 MG tablet, Take by mouth, Disp: , Rfl:     Vitamin E 100 UNITS TABS, Take 1 capsule by mouth daily. , Disp: , Rfl:     Multiple Vitamins-Minerals (THERAPEUTIC MULTIVITAMIN-MINERALS) tablet, Take 1 tablet by mouth daily. , Disp: , Rfl:   Allergies   Allergen Reactions    Biaxin [Clarithromycin] Other (See Comments)     Many years ago, does not recall reaction    Pcn [Penicillins] Rash       Past Medical History:   Diagnosis Date    D[facility] [address]     Epiretinal membrane     Hard of hearing     Hyperlipidemia     Hypertension     Macular degeneration     Osteoarthritis     Vitamin D deficiency      Past Surgical History:   Procedure Laterality Date    CATARACT REMOVAL WITH IMPLANT Bilateral     COLONOSCOPY  2010    COLPOSCOPY      DILATION AND CURETTAGE OF UTERUS      HEMORRHOID SURGERY  years ago    REPAIR RETINAL TEAR/HOLE Left     TONSILLECTOMY AND ADENOIDECTOMY      VITRECTOMY Left 2015     Family History   Problem Relation Age of Onset    Colon Cancer Mother     Hypertension Mother     Lung Cancer Father         Black Lung    Breast Cancer Sister     Lung Cancer Brother     Other Other         6 siblings, most  of infirmities of old age   Polly Cushing No Known Problems Sister     No Known Problems Sister  No Known Problems Brother     No Known Problems Brother      Social History     Socioeconomic History    Marital status:      Spouse name: Not on file    Number of children: Not on file    Years of education: Not on file    Highest education level: Not on file   Occupational History    Occupation: retired bakery   Social Needs    Financial resource strain: Not on file    Food insecurity     Worry: Not on file     Inability: Not on file   [employer] needs     Medical: Not on file     Non-medical: Not on file   Tobacco Use    Smoking status: Never Smoker    Smokeless tobacco: Never Used   Substance and Sexual Activity    Alcohol use: No    Drug use: No    Sexual activity: Not on file   Lifestyle    Physical activity     Days per week: Not on file     Minutes per session: Not on file    Stress: Not on file   Relationships    Social connections     Talks on phone: Not on file     Gets together: Not on file     Attends Latter-day service: Not on file     Active member of club or organization: Not on file     Attends meetings of clubs or organizations: Not on file     Relationship status: Not on file    Intimate partner violence     Fear of current or ex partner: Not on file     Emotionally abused: Not on file     Physically abused: Not on file     Forced sexual activity: Not on file   Other Topics Concern    Not on file   Social History Narrative    Not on file       There were no vitals filed for this visit. Physical Exam        Seen By:  Patrick Meza DO  Medicare Annual Wellness Visit  Name: Kelsey Thompson Date: 2021   MRN: [id number] Sex: Female   Age: 80 y.o. Ethnicity: Non-/Non    : 3/13/1932 Race: Lillie Dickerson is here for No chief complaint on file. Screenings for behavioral, psychosocial and functional/safety risks, and cognitive dysfunction are all negative except as indicated below.  These results, as well as other patient (Including outside providers/suppliers regularly involved in providing care):   Patient Care Team:  Lea Livingston DO as PCP - [facility] DO [place] as PCP - 121Neeru Alfredled Provider  Jessica Medley DPM as Consulting Physician ([address]    Wt Readings from Last 3 Encounters:   02/19/21 118 lb (53.5 kg)   11/13/20 116 lb (52.6 kg)   08/14/20 117 lb (53.1 kg)     There were no vitals filed for this visit. There is no height or weight on file to calculate BMI. Based upon direct observation of the patient, evaluation of cognition reveals recent and remote memory intact. Patient's complete Health Risk Assessment and screening values have been reviewed and are found in Flowsheets. The following problems were reviewed today and where indicated follow up appointments were made and/or referrals ordered. Positive Risk Factor Screenings with Interventions:          General Health and ACP:  General  In general, how would you say your health is?: Very Good  In the past 7 days, have you experienced any of the following?  New or Increased Pain, New or Increased Fatigue, Loneliness, Social Isolation, Stress or Anger?: (!) Loneliness  Do you get the social and emotional support that you need?: Yes  Do you have a Living Will?: Yes  Advance Directives     Power of  Living Will ACP-Advance Directive ACP-Power of     Not on File Filed on 03/11/15 Filed Not on File      General Health Risk Interventions:  · No Living Will: Patient declines ACP discussion/assistance    Health Habits/Nutrition:  Health Habits/Nutrition  Do you exercise for at least 20 minutes 2-3 times per week?: Yes  Have you lost any weight without trying in the past 3 months?: No  Do you eat only one meal per day?: No  Have you seen the dentist within the past year?: (!) No     Health Habits/Nutrition Interventions:  · Dental exam overdue:  patient encouraged to make appointment with his/her dentist    Hearing/Vision:  No exam data present  Hearing/Vision  Do you or your family notice any trouble with your hearing that hasn't been managed with hearing aids?: (!) Yes  Do you have difficulty driving, watching TV, or doing any of your daily activities because of your eyesight?: (!) Yes  Have you had an eye exam within the past year?: Yes  Hearing/Vision Interventions:  · Hearing concerns:  patient declines any further evaluation/treatment for hearing issues      Personalized Preventive Plan   Current Health Maintenance Status  Immunization History   Administered Date(s) Administered    Influenza, High Dose (Fluzone 65 yrs and older) 11/26/2018    Influenza, Quadv, adjuvanted, 65 yrs +, IM, PF (Fluad) 11/13/2020    Influenza, Triv, inactivated, subunit, adjuvanted, IM (Fluad 72 yrs and older) 10/30/2019    Pneumococcal Polysaccharide ([id number]) 10/30/2019    Td, unspecified formulation 01/24/2011, 05/28/2013    Tdap (Boostrix, Adacel) 09/21/2016        Health Maintenance   Topic Date Due    COVID-19 Vaccine (1 of 2) 03/13/1948    Shingles Vaccine (1 of 2) 03/13/1982    Annual Wellness Visit (AWV)  05/29/2019    Potassium monitoring  08/14/2021    Creatinine monitoring  08/14/2021    DTaP/Tdap/Td vaccine (2 - Td) 09/21/2026    Flu vaccine  Completed    Pneumococcal 65+ years Vaccine  Completed    Hepatitis A vaccine  Aged Out    Hepatitis B vaccine  Aged Out    Hib vaccine  Aged Out    Meningococcal (ACWY) vaccine  Aged Out     Recommendations for [place] Due: see orders and patient instructions/AVS.  . Recommended screening schedule for the next 5-10 years is provided to the patient in written form: see Patient Instructions/AVS.    There are no diagnoses linked to this encounter. Cardiovascular Disease Risk Counseling: Assessed the patient's risk to develop cardiovascular disease and reviewed main risk factors.    Reviewed steps to reduce disease risk including:   · Quitting tobacco use, reducing amount smoked, or not starting the habit  · Making healthy food choices  · Being physically active and gradualy increasing activity levels   · Reduce weight and determine a healthy BMI goal  · Monitor blood pressure and treat if higher than 140/90 mmHg  · Maintain blood total cholesterol levels under 5 mmol/l or 190 mg/dl  · Maintain LDL cholesterol levels under 3.0 mmol/l or 115 mg/dl   · Control blood glucose levels  · Consider taking aspirin (75 mg daily), once blood pressure is controlled   Provided a follow up plan.   Time spent (minutes): 5 min

## 2021-02-19 NOTE — PATIENT INSTRUCTIONS
Personalized Preventive Plan for [place] - 2/19/2021  Medicare offers a range of preventive health benefits. Some of the tests and screenings are paid in full while other may be subject to a deductible, co-insurance, and/or copay. Some of these benefits include a comprehensive review of your medical history including lifestyle, illnesses that may run in your family, and various assessments and screenings as appropriate. After reviewing your medical record and screening and assessments performed today your provider may have ordered immunizations, labs, imaging, and/or referrals for you. A list of these orders (if applicable) as well as your Preventive Care list are included within your After Visit Summary for your review. Other Preventive Recommendations:    · A preventive eye exam performed by an eye specialist is recommended every 1-2 years to screen for glaucoma; cataracts, macular degeneration, and other eye disorders. · A preventive dental visit is recommended every 6 months. · Try to get at least 150 minutes of exercise per week or 10,000 steps per day on a pedometer . · Order or download the FREE \"Exercise & Physical Activity: Your Everyday Guide\" from [employer]. Call 5-321.214.2199 or search [employer] online. · You need 3931-2538 mg of calcium and 0742-3293 IU of vitamin D per day. It is possible to meet your calcium requirement with diet alone, but a vitamin D supplement is usually necessary to meet this goal.  · When exposed to the sun, use a sunscreen that protects against both UVA and UVB radiation with an SPF of 30 or greater. Reapply every 2 to 3 hours or after sweating, drying off with a towel, or swimming. · Always wear a seat belt when traveling in a car. Always wear a helmet when riding a bicycle or motorcycle.

## 2021-05-14 ENCOUNTER — OFFICE VISIT (OUTPATIENT)
Dept: FAMILY MEDICINE CLINIC | Age: 86
End: 2021-05-14
Payer: MEDICARE

## 2021-05-14 VITALS
OXYGEN SATURATION: 96 % | HEIGHT: 62 IN | DIASTOLIC BLOOD PRESSURE: 80 MMHG | WEIGHT: 120 LBS | BODY MASS INDEX: 22.08 KG/M2 | HEART RATE: 68 BPM | RESPIRATION RATE: 18 BRPM | SYSTOLIC BLOOD PRESSURE: 138 MMHG | TEMPERATURE: 97 F

## 2021-05-14 DIAGNOSIS — F06.4 ANXIETY DISORDER DUE TO GENERAL MEDICAL CONDITION: ICD-10-CM

## 2021-05-14 DIAGNOSIS — F01.50 VASCULAR DEMENTIA WITHOUT BEHAVIORAL DISTURBANCE (HCC): ICD-10-CM

## 2021-05-14 DIAGNOSIS — I10 ESSENTIAL HYPERTENSION: ICD-10-CM

## 2021-05-14 PROCEDURE — 99213 OFFICE O/P EST LOW 20 MIN: CPT | Performed by: FAMILY MEDICINE

## 2021-05-14 RX ORDER — HYDROXYZINE HYDROCHLORIDE 25 MG/1
TABLET, FILM COATED ORAL
Qty: 180 TABLET | Refills: 1 | Status: SHIPPED
Start: 2021-05-14 | End: 2021-12-06

## 2021-05-14 RX ORDER — LISINOPRIL AND HYDROCHLOROTHIAZIDE 12.5; 1 MG/1; MG/1
1 TABLET ORAL DAILY
Qty: 90 TABLET | Refills: 1 | Status: ON HOLD
Start: 2021-05-14 | End: 2021-07-08 | Stop reason: HOSPADM

## 2021-05-14 RX ORDER — MEMANTINE HYDROCHLORIDE 10 MG/1
10 TABLET ORAL 2 TIMES DAILY
Qty: 180 TABLET | Refills: 1 | Status: SHIPPED
Start: 2021-05-14 | End: 2021-11-10

## 2021-05-14 ASSESSMENT — ENCOUNTER SYMPTOMS
SHORTNESS OF BREATH: 0
CHEST TIGHTNESS: 0
PHOTOPHOBIA: 0
DIARRHEA: 0
BLOOD IN STOOL: 0
EYE PAIN: 0
ABDOMINAL PAIN: 0
TROUBLE SWALLOWING: 0
EYE REDNESS: 0
NAUSEA: 0
VOMITING: 0
COUGH: 0
BACK PAIN: 0
SINUS PAIN: 0
EYE DISCHARGE: 0
ALLERGIC/IMMUNOLOGIC NEGATIVE: 1
SORE THROAT: 0

## 2021-05-14 NOTE — PROGRESS NOTES
21  Prudencio Dys : 3/13/1932 Sex: female  Age: 80 y.o. Assessment and Plan:  [place] was seen today for hypertension. Diagnoses and all orders for this visit:    Anxiety disorder due to general medical condition  -     hydrOXYzine (ATARAX) 25 MG tablet; 1 or 2 tablets nightly    Essential hypertension  -     lisinopril-hydroCHLOROthiazide (PRINZIDE;ZESTORETIC) 10-12.5 MG per tablet; Take 1 tablet by mouth daily    Vascular dementia without behavioral disturbance (HCC)  -     memantine (NAMENDA) 10 MG tablet; Take 1 tablet by mouth 2 times daily        Return in about 3 months (around 2021), or FBW. Chief Complaint   Patient presents with    Hypertension       The patient is here for blood pressure check. Patient has been taking their medications as prescribed. No recent changes to their medications. No headache or visual disturbances. No dizziness or tinnitus. No chest pain, palpitations, or dyspnea. No nausea and vomiting. No numbness, tingling and or weakness to the extremities. No fevers or chills. No recent illness. Is otherwise doing well. Eating well, getting out and moving. Her mental status is at baseline, no significant deterioration since last visit. Review of Systems   Constitutional: Negative for appetite change, fatigue and unexpected weight change. HENT: Negative for congestion, ear pain, hearing loss, sinus pain, sore throat and trouble swallowing. Eyes: Negative for photophobia, pain, discharge and redness. Respiratory: Negative for cough, chest tightness and shortness of breath. Cardiovascular: Negative for chest pain, palpitations and leg swelling. Gastrointestinal: Negative for abdominal pain, blood in stool, diarrhea, nausea and vomiting. Endocrine: Negative. Genitourinary: Negative for dysuria, flank pain, frequency and hematuria. Musculoskeletal: Negative for arthralgias, back pain, joint swelling and myalgias. Skin: Negative. Allergic/Immunologic: Negative. Neurological: Negative for dizziness, seizures, syncope, weakness, light-headedness, numbness and headaches. Hematological: Negative for adenopathy. Does not bruise/bleed easily. Psychiatric/Behavioral: Negative. Current Outpatient Medications:     hydrOXYzine (ATARAX) 25 MG tablet, 1 or 2 tablets nightly, Disp: 180 tablet, Rfl: 1    lisinopril-hydroCHLOROthiazide (PRINZIDE;ZESTORETIC) 10-12.5 MG per tablet, Take 1 tablet by mouth daily, Disp: 90 tablet, Rfl: 1    memantine (NAMENDA) 10 MG tablet, Take 1 tablet by mouth 2 times daily, Disp: 180 tablet, Rfl: 1    vitamin D (CHOLECALCIFEROL) 1000 UNIT TABS tablet, Take by mouth, Disp: , Rfl:     aspirin 81 MG tablet, Take by mouth, Disp: , Rfl:     Vitamin E 100 UNITS TABS, Take 1 capsule by mouth daily. , Disp: , Rfl:     Multiple Vitamins-Minerals (THERAPEUTIC MULTIVITAMIN-MINERALS) tablet, Take 1 tablet by mouth daily. , Disp: , Rfl:   Allergies   Allergen Reactions    Biaxin [Clarithromycin] Other (See Comments)     Many years ago, does not recall reaction    Pcn [Penicillins] Rash       Past Medical History:   Diagnosis Date    Dementia [address] [address]     Epiretinal membrane     Hard of hearing     Hyperlipidemia     Hypertension     Macular degeneration     Osteoarthritis     Vitamin D deficiency      Past Surgical History:   Procedure Laterality Date    CATARACT REMOVAL WITH IMPLANT Bilateral     COLONOSCOPY  2010    COLPOSCOPY      DILATION AND CURETTAGE OF UTERUS      HEMORRHOID SURGERY  years ago    REPAIR RETINAL TEAR/HOLE Left     TONSILLECTOMY AND ADENOIDECTOMY      VITRECTOMY Left 2015     Family History   Problem Relation Age of Onset    Colon Cancer Mother     Hypertension Mother     Lung Cancer Father         Black Lung    Breast Cancer Sister     Lung Cancer Brother     Other Other         6 siblings, most  of infirmities of old age   Prieto Bishop No Known Problems Sister    Prieto Bishop No Known Problems Sister     No Known Problems Brother     No Known Problems Brother      Social History     Socioeconomic History    Marital status:      Spouse name: Not on file    Number of children: Not on file    Years of education: Not on file    Highest education level: Not on file   Occupational History    Occupation: retired bakery   Social Needs    Financial resource strain: Not on file    Food insecurity     Worry: Not on file     Inability: Not on file   [employer] needs     Medical: Not on file     Non-medical: Not on file   Tobacco Use    Smoking status: Never Smoker    Smokeless tobacco: Never Used   Substance and Sexual Activity    Alcohol use: No    Drug use: No    Sexual activity: Not on file   Lifestyle    Physical activity     Days per week: Not on file     Minutes per session: Not on file    Stress: Not on file   Relationships    Social connections     Talks on phone: Not on file     Gets together: Not on file     Attends [employer] service: Not on file     Active member of club or organization: Not on file     Attends meetings of clubs or organizations: Not on file     Relationship status: Not on file    Intimate partner violence     Fear of current or ex partner: Not on file     Emotionally abused: Not on file     Physically abused: Not on file     Forced sexual activity: Not on file   Other Topics Concern    Not on file   Social History Narrative    Not on file       Vitals:    05/14/21 0824   BP: 138/80   Pulse: 68   Resp: 18   Temp: 97 °F (36.1 °C)   TempSrc: Temporal   SpO2: 96%   Weight: 120 lb (54.4 kg)   Height: 5' 2\" (1.575 m)       Physical Exam  Vitals signs and nursing note reviewed. Constitutional:       Appearance: She is well-developed. HENT:      Head: Atraumatic. Right Ear: External ear normal.      Left Ear: External ear normal.      Nose: Nose normal.      Mouth/Throat:      Pharynx: No oropharyngeal exudate.    Eyes: Conjunctiva/sclera: Conjunctivae normal.      Pupils: Pupils are equal, round, and reactive to light. Neck:      Musculoskeletal: Normal range of motion and neck supple. Thyroid: No thyromegaly. Trachea: No tracheal deviation. Cardiovascular:      Rate and Rhythm: Normal rate and regular rhythm. Heart sounds: No murmur. No friction rub. No gallop. Pulmonary:      Effort: Pulmonary effort is normal. No respiratory distress. Breath sounds: Normal breath sounds. Abdominal:      General: Bowel sounds are normal.      Palpations: Abdomen is soft. Musculoskeletal: Normal range of motion. General: No tenderness or deformity. Lymphadenopathy:      Cervical: No cervical adenopathy. Skin:     General: Skin is warm and dry. Capillary Refill: Capillary refill takes less than 2 seconds. Findings: No rash. Neurological:      Mental Status: She is alert and oriented to person, place, and time. Sensory: No sensory deficit. Motor: No abnormal muscle tone.       Coordination: Coordination normal.      Deep Tendon Reflexes: Reflexes normal.             Seen By:  Jami Harvey DO

## 2021-07-06 ENCOUNTER — APPOINTMENT (OUTPATIENT)
Dept: GENERAL RADIOLOGY | Age: 86
DRG: 640 | End: 2021-07-06
Payer: MEDICARE

## 2021-07-06 ENCOUNTER — HOSPITAL ENCOUNTER (INPATIENT)
Age: 86
LOS: 1 days | Discharge: HOME HEALTH | DRG: 640 | End: 2021-07-08
Attending: EMERGENCY MEDICINE | Admitting: INTERNAL MEDICINE
Payer: MEDICARE

## 2021-07-06 DIAGNOSIS — E87.1 HYPONATREMIA: Primary | ICD-10-CM

## 2021-07-06 DIAGNOSIS — R41.82 ALTERED MENTAL STATUS, UNSPECIFIED ALTERED MENTAL STATUS TYPE: ICD-10-CM

## 2021-07-06 PROCEDURE — 83605 ASSAY OF LACTIC ACID: CPT

## 2021-07-06 PROCEDURE — 87088 URINE BACTERIA CULTURE: CPT

## 2021-07-06 PROCEDURE — 83880 ASSAY OF NATRIURETIC PEPTIDE: CPT

## 2021-07-06 PROCEDURE — 99283 EMERGENCY DEPT VISIT LOW MDM: CPT

## 2021-07-06 PROCEDURE — 81003 URINALYSIS AUTO W/O SCOPE: CPT

## 2021-07-06 PROCEDURE — 71045 X-RAY EXAM CHEST 1 VIEW: CPT

## 2021-07-06 PROCEDURE — 83690 ASSAY OF LIPASE: CPT

## 2021-07-06 PROCEDURE — 80053 COMPREHEN METABOLIC PANEL: CPT

## 2021-07-06 PROCEDURE — 84484 ASSAY OF TROPONIN QUANT: CPT

## 2021-07-06 PROCEDURE — 85025 COMPLETE CBC W/AUTO DIFF WBC: CPT

## 2021-07-06 PROCEDURE — 87040 BLOOD CULTURE FOR BACTERIA: CPT

## 2021-07-07 ENCOUNTER — APPOINTMENT (OUTPATIENT)
Dept: CT IMAGING | Age: 86
DRG: 640 | End: 2021-07-07
Payer: MEDICARE

## 2021-07-07 PROBLEM — E87.1 HYPONATREMIA: Status: ACTIVE | Noted: 2021-07-07

## 2021-07-07 LAB
ALBUMIN SERPL-MCNC: 4 G/DL (ref 3.5–5.2)
ALP BLD-CCNC: 118 U/L (ref 35–104)
ALT SERPL-CCNC: 62 U/L (ref 0–32)
ANION GAP SERPL CALCULATED.3IONS-SCNC: 10 MMOL/L (ref 7–16)
ANION GAP SERPL CALCULATED.3IONS-SCNC: 7 MMOL/L (ref 7–16)
ANION GAP SERPL CALCULATED.3IONS-SCNC: 9 MMOL/L (ref 7–16)
AST SERPL-CCNC: 122 U/L (ref 0–31)
BASOPHILS ABSOLUTE: 0.02 E9/L (ref 0–0.2)
BASOPHILS RELATIVE PERCENT: 0.6 % (ref 0–2)
BILIRUB SERPL-MCNC: 0.8 MG/DL (ref 0–1.2)
BILIRUBIN URINE: NEGATIVE
BLOOD, URINE: NEGATIVE
BUN BLDV-MCNC: 10 MG/DL (ref 6–23)
BUN BLDV-MCNC: 9 MG/DL (ref 6–23)
BUN BLDV-MCNC: 9 MG/DL (ref 6–23)
CALCIUM SERPL-MCNC: 8.8 MG/DL (ref 8.6–10.2)
CALCIUM SERPL-MCNC: 9.2 MG/DL (ref 8.6–10.2)
CALCIUM SERPL-MCNC: 9.4 MG/DL (ref 8.6–10.2)
CHLORIDE BLD-SCNC: 88 MMOL/L (ref 98–107)
CHLORIDE BLD-SCNC: 96 MMOL/L (ref 98–107)
CHLORIDE BLD-SCNC: 97 MMOL/L (ref 98–107)
CHLORIDE URINE RANDOM: 45 MMOL/L
CLARITY: CLEAR
CO2: 26 MMOL/L (ref 22–29)
CO2: 27 MMOL/L (ref 22–29)
CO2: 30 MMOL/L (ref 22–29)
COLOR: YELLOW
CORTISOL TOTAL: 17.04 MCG/DL (ref 2.68–18.4)
CREAT SERPL-MCNC: 0.7 MG/DL (ref 0.5–1)
CREAT SERPL-MCNC: 0.7 MG/DL (ref 0.5–1)
CREAT SERPL-MCNC: 0.8 MG/DL (ref 0.5–1)
EKG ATRIAL RATE: 70 BPM
EKG P AXIS: 22 DEGREES
EKG P-R INTERVAL: 166 MS
EKG Q-T INTERVAL: 414 MS
EKG QRS DURATION: 94 MS
EKG QTC CALCULATION (BAZETT): 447 MS
EKG R AXIS: 115 DEGREES
EKG T AXIS: 6 DEGREES
EKG VENTRICULAR RATE: 70 BPM
EOSINOPHILS ABSOLUTE: 0.16 E9/L (ref 0.05–0.5)
EOSINOPHILS RELATIVE PERCENT: 4.4 % (ref 0–6)
GFR AFRICAN AMERICAN: >60
GFR NON-AFRICAN AMERICAN: >60 ML/MIN/1.73
GLUCOSE BLD-MCNC: 110 MG/DL (ref 74–99)
GLUCOSE BLD-MCNC: 123 MG/DL (ref 74–99)
GLUCOSE BLD-MCNC: 134 MG/DL (ref 74–99)
GLUCOSE URINE: NEGATIVE MG/DL
HCT VFR BLD CALC: 33.7 % (ref 34–48)
HEMOGLOBIN: 11.4 G/DL (ref 11.5–15.5)
IMMATURE GRANULOCYTES #: 0.01 E9/L
IMMATURE GRANULOCYTES %: 0.3 % (ref 0–5)
KETONES, URINE: NEGATIVE MG/DL
LACTIC ACID, SEPSIS: 0.8 MMOL/L (ref 0.5–1.9)
LEUKOCYTE ESTERASE, URINE: NEGATIVE
LIPASE: 27 U/L (ref 13–60)
LYMPHOCYTES ABSOLUTE: 0.92 E9/L (ref 1.5–4)
LYMPHOCYTES RELATIVE PERCENT: 25.6 % (ref 20–42)
MCH RBC QN AUTO: 29.5 PG (ref 26–35)
MCHC RBC AUTO-ENTMCNC: 33.8 % (ref 32–34.5)
MCV RBC AUTO: 87.3 FL (ref 80–99.9)
MONOCYTES ABSOLUTE: 0.42 E9/L (ref 0.1–0.95)
MONOCYTES RELATIVE PERCENT: 11.7 % (ref 2–12)
NEUTROPHILS ABSOLUTE: 2.07 E9/L (ref 1.8–7.3)
NEUTROPHILS RELATIVE PERCENT: 57.4 % (ref 43–80)
NITRITE, URINE: NEGATIVE
OSMOLALITY: 277 MOSM/KG (ref 285–310)
PDW BLD-RTO: 13.8 FL (ref 11.5–15)
PH UA: 6.5 (ref 5–9)
PLATELET # BLD: 179 E9/L (ref 130–450)
PMV BLD AUTO: 9.1 FL (ref 7–12)
POTASSIUM REFLEX MAGNESIUM: 3.7 MMOL/L (ref 3.5–5)
POTASSIUM REFLEX MAGNESIUM: 3.8 MMOL/L (ref 3.5–5)
POTASSIUM SERPL-SCNC: 4.1 MMOL/L (ref 3.5–5)
PRO-BNP: 360 PG/ML (ref 0–450)
PROTEIN UA: NEGATIVE MG/DL
RBC # BLD: 3.86 E12/L (ref 3.5–5.5)
SODIUM BLD-SCNC: 125 MMOL/L (ref 132–146)
SODIUM BLD-SCNC: 131 MMOL/L (ref 132–146)
SODIUM BLD-SCNC: 134 MMOL/L (ref 132–146)
SODIUM URINE: 57 MMOL/L
SPECIFIC GRAVITY UA: 1.01 (ref 1–1.03)
TOTAL PROTEIN: 6.4 G/DL (ref 6.4–8.3)
TROPONIN, HIGH SENSITIVITY: 52 NG/L (ref 0–9)
TROPONIN, HIGH SENSITIVITY: 60 NG/L (ref 0–9)
TSH SERPL DL<=0.05 MIU/L-ACNC: 2.24 UIU/ML (ref 0.27–4.2)
URIC ACID, SERUM: 2.8 MG/DL (ref 2.4–5.7)
UROBILINOGEN, URINE: 0.2 E.U./DL
WBC # BLD: 3.6 E9/L (ref 4.5–11.5)

## 2021-07-07 PROCEDURE — 80048 BASIC METABOLIC PNL TOTAL CA: CPT

## 2021-07-07 PROCEDURE — 84300 ASSAY OF URINE SODIUM: CPT

## 2021-07-07 PROCEDURE — G0378 HOSPITAL OBSERVATION PER HR: HCPCS

## 2021-07-07 PROCEDURE — 2580000003 HC RX 258: Performed by: INTERNAL MEDICINE

## 2021-07-07 PROCEDURE — 2580000003 HC RX 258: Performed by: EMERGENCY MEDICINE

## 2021-07-07 PROCEDURE — 84443 ASSAY THYROID STIM HORMONE: CPT

## 2021-07-07 PROCEDURE — 83930 ASSAY OF BLOOD OSMOLALITY: CPT

## 2021-07-07 PROCEDURE — 6360000002 HC RX W HCPCS: Performed by: INTERNAL MEDICINE

## 2021-07-07 PROCEDURE — 6370000000 HC RX 637 (ALT 250 FOR IP): Performed by: INTERNAL MEDICINE

## 2021-07-07 PROCEDURE — 96372 THER/PROPH/DIAG INJ SC/IM: CPT

## 2021-07-07 PROCEDURE — 2500000003 HC RX 250 WO HCPCS: Performed by: INTERNAL MEDICINE

## 2021-07-07 PROCEDURE — 82533 TOTAL CORTISOL: CPT

## 2021-07-07 PROCEDURE — 96374 THER/PROPH/DIAG INJ IV PUSH: CPT

## 2021-07-07 PROCEDURE — 93005 ELECTROCARDIOGRAM TRACING: CPT | Performed by: PHYSICIAN ASSISTANT

## 2021-07-07 PROCEDURE — 97161 PT EVAL LOW COMPLEX 20 MIN: CPT

## 2021-07-07 PROCEDURE — 70450 CT HEAD/BRAIN W/O DYE: CPT

## 2021-07-07 PROCEDURE — 2060000000 HC ICU INTERMEDIATE R&B

## 2021-07-07 PROCEDURE — 51702 INSERT TEMP BLADDER CATH: CPT

## 2021-07-07 PROCEDURE — 82436 ASSAY OF URINE CHLORIDE: CPT

## 2021-07-07 PROCEDURE — 84550 ASSAY OF BLOOD/URIC ACID: CPT

## 2021-07-07 PROCEDURE — 84484 ASSAY OF TROPONIN QUANT: CPT

## 2021-07-07 PROCEDURE — 36415 COLL VENOUS BLD VENIPUNCTURE: CPT

## 2021-07-07 RX ORDER — M-VIT,TX,IRON,MINS/CALC/FOLIC 27MG-0.4MG
1 TABLET ORAL DAILY
Status: DISCONTINUED | OUTPATIENT
Start: 2021-07-07 | End: 2021-07-08 | Stop reason: HOSPADM

## 2021-07-07 RX ORDER — POTASSIUM CHLORIDE 7.45 MG/ML
10 INJECTION INTRAVENOUS PRN
Status: DISCONTINUED | OUTPATIENT
Start: 2021-07-07 | End: 2021-07-08 | Stop reason: HOSPADM

## 2021-07-07 RX ORDER — HYDROXYZINE HYDROCHLORIDE 10 MG/1
10 TABLET, FILM COATED ORAL NIGHTLY
Status: DISCONTINUED | OUTPATIENT
Start: 2021-07-07 | End: 2021-07-08 | Stop reason: HOSPADM

## 2021-07-07 RX ORDER — MEMANTINE HYDROCHLORIDE 10 MG/1
10 TABLET ORAL 2 TIMES DAILY
Status: DISCONTINUED | OUTPATIENT
Start: 2021-07-07 | End: 2021-07-08 | Stop reason: HOSPADM

## 2021-07-07 RX ORDER — SODIUM CHLORIDE 9 MG/ML
25 INJECTION, SOLUTION INTRAVENOUS PRN
Status: DISCONTINUED | OUTPATIENT
Start: 2021-07-07 | End: 2021-07-08 | Stop reason: HOSPADM

## 2021-07-07 RX ORDER — SODIUM CHLORIDE 9 MG/ML
INJECTION, SOLUTION INTRAVENOUS CONTINUOUS
Status: DISCONTINUED | OUTPATIENT
Start: 2021-07-07 | End: 2021-07-07

## 2021-07-07 RX ORDER — POTASSIUM CHLORIDE 20 MEQ/1
40 TABLET, EXTENDED RELEASE ORAL PRN
Status: DISCONTINUED | OUTPATIENT
Start: 2021-07-07 | End: 2021-07-08 | Stop reason: HOSPADM

## 2021-07-07 RX ORDER — SODIUM CHLORIDE 0.9 % (FLUSH) 0.9 %
10 SYRINGE (ML) INJECTION EVERY 12 HOURS SCHEDULED
Status: DISCONTINUED | OUTPATIENT
Start: 2021-07-07 | End: 2021-07-08 | Stop reason: HOSPADM

## 2021-07-07 RX ORDER — SODIUM CHLORIDE 0.9 % (FLUSH) 0.9 %
10 SYRINGE (ML) INJECTION PRN
Status: DISCONTINUED | OUTPATIENT
Start: 2021-07-07 | End: 2021-07-08 | Stop reason: HOSPADM

## 2021-07-07 RX ORDER — ONDANSETRON 2 MG/ML
4 INJECTION INTRAMUSCULAR; INTRAVENOUS EVERY 6 HOURS PRN
Status: DISCONTINUED | OUTPATIENT
Start: 2021-07-07 | End: 2021-07-08 | Stop reason: HOSPADM

## 2021-07-07 RX ORDER — DEXTROSE, SODIUM CHLORIDE, AND POTASSIUM CHLORIDE 5; .45; .075 G/100ML; G/100ML; G/100ML
INJECTION INTRAVENOUS CONTINUOUS
Status: DISCONTINUED | OUTPATIENT
Start: 2021-07-07 | End: 2021-07-07

## 2021-07-07 RX ORDER — ACETAMINOPHEN 325 MG/1
650 TABLET ORAL EVERY 6 HOURS PRN
Status: DISCONTINUED | OUTPATIENT
Start: 2021-07-07 | End: 2021-07-08 | Stop reason: HOSPADM

## 2021-07-07 RX ORDER — VITAMIN B COMPLEX
1000 TABLET ORAL DAILY
Status: DISCONTINUED | OUTPATIENT
Start: 2021-07-07 | End: 2021-07-08 | Stop reason: HOSPADM

## 2021-07-07 RX ORDER — ACETAMINOPHEN 650 MG/1
650 SUPPOSITORY RECTAL EVERY 6 HOURS PRN
Status: DISCONTINUED | OUTPATIENT
Start: 2021-07-07 | End: 2021-07-08 | Stop reason: HOSPADM

## 2021-07-07 RX ORDER — SENNA PLUS 8.6 MG/1
1 TABLET ORAL DAILY PRN
Status: DISCONTINUED | OUTPATIENT
Start: 2021-07-07 | End: 2021-07-08 | Stop reason: HOSPADM

## 2021-07-07 RX ORDER — ASPIRIN 81 MG/1
81 TABLET, CHEWABLE ORAL DAILY
Status: DISCONTINUED | OUTPATIENT
Start: 2021-07-07 | End: 2021-07-08 | Stop reason: HOSPADM

## 2021-07-07 RX ORDER — HYDRALAZINE HYDROCHLORIDE 20 MG/ML
10 INJECTION INTRAMUSCULAR; INTRAVENOUS EVERY 4 HOURS PRN
Status: DISCONTINUED | OUTPATIENT
Start: 2021-07-07 | End: 2021-07-08 | Stop reason: HOSPADM

## 2021-07-07 RX ORDER — 0.9 % SODIUM CHLORIDE 0.9 %
1000 INTRAVENOUS SOLUTION INTRAVENOUS ONCE
Status: COMPLETED | OUTPATIENT
Start: 2021-07-07 | End: 2021-07-07

## 2021-07-07 RX ORDER — ONDANSETRON 4 MG/1
4 TABLET, ORALLY DISINTEGRATING ORAL EVERY 8 HOURS PRN
Status: DISCONTINUED | OUTPATIENT
Start: 2021-07-07 | End: 2021-07-08 | Stop reason: HOSPADM

## 2021-07-07 RX ADMIN — MULTIPLE VITAMINS W/ MINERALS TAB 1 TABLET: TAB at 08:26

## 2021-07-07 RX ADMIN — ASPIRIN 81 MG: 81 TABLET, CHEWABLE ORAL at 08:26

## 2021-07-07 RX ADMIN — ENOXAPARIN SODIUM 40 MG: 40 INJECTION SUBCUTANEOUS at 13:04

## 2021-07-07 RX ADMIN — Medication 1000 UNITS: at 08:26

## 2021-07-07 RX ADMIN — HYDRALAZINE HYDROCHLORIDE 10 MG: 20 INJECTION INTRAMUSCULAR; INTRAVENOUS at 20:28

## 2021-07-07 RX ADMIN — HYDROXYZINE HYDROCHLORIDE 10 MG: 10 TABLET ORAL at 20:24

## 2021-07-07 RX ADMIN — POTASSIUM CHLORIDE, DEXTROSE MONOHYDRATE AND SODIUM CHLORIDE: 75; 5; 450 INJECTION, SOLUTION INTRAVENOUS at 15:39

## 2021-07-07 RX ADMIN — SODIUM CHLORIDE, PRESERVATIVE FREE 10 ML: 5 INJECTION INTRAVENOUS at 20:24

## 2021-07-07 RX ADMIN — SODIUM CHLORIDE, PRESERVATIVE FREE 10 ML: 5 INJECTION INTRAVENOUS at 08:27

## 2021-07-07 RX ADMIN — MEMANTINE HYDROCHLORIDE 10 MG: 10 TABLET, FILM COATED ORAL at 08:26

## 2021-07-07 RX ADMIN — SODIUM CHLORIDE: 9 INJECTION, SOLUTION INTRAVENOUS at 08:37

## 2021-07-07 RX ADMIN — SODIUM CHLORIDE 1000 ML: 9 INJECTION, SOLUTION INTRAVENOUS at 01:43

## 2021-07-07 RX ADMIN — MEMANTINE HYDROCHLORIDE 10 MG: 10 TABLET, FILM COATED ORAL at 20:24

## 2021-07-07 NOTE — PROGRESS NOTES
Message sent to Dr. Jaguar Winn regarding new consult information.  Electronically signed by Marlin Nagy RN on 7/7/2021 at 7:58 AM

## 2021-07-07 NOTE — ED NOTES
Patient urinated 800ml clear yellow urine. Dr. Tiara Martinez notified.      Princess Li RN  07/07/21 0023

## 2021-07-07 NOTE — H&P
Internal Medicine History & Physical     Name: [place]  : 3/13/1932  Chief Complaint: Altered Mental Status, Urinary Retention (unsure of the last time she went to the bathroom, family states abdomen is swollen), and Leg Swelling  Primary Care Physician: Sivan Pollack DO  Admission date: 2021  Date of service: 2021     History of Present Illness  [place] is a 80y.o. year old female who was seen today just after getting a padilla catheter inserted. She was sitting up in bed, no family present. She tells me she is feeling better today. [place] states she has a recurring problem every once and a while where she cannot pee. She states she does not have any other urinary symptoms that accompany this. [place] tells me she drinks plenty of water at home. She follows with urology as an outpatient and states her symptoms are usually treated/made better with antibiotics and a catheter and then she is able to pee on her own again for a little while. Her symptoms are not made worse by anything. She states her symptoms started throughout the night where she felt she had to urinate but was unable to so she presented to the ED yesterday. There are no family or friends at bedside. The history is provided by the patient. She is felt to be a good historian. ED course:   Initial blood work and imaging studies performed. Admission recommended by ED physician. Case discussed with ED provider.  Meds in ED consisted of the following:  Medications   0.9 % sodium chloride bolus (0 mLs Intravenous Stopped 21 3281)       Past Medical History:   Diagnosis Date    Dementia ([address]     Epiretinal membrane     Hard of hearing     Hyperlipidemia     Hypertension     Macular degeneration     Osteoarthritis     Vitamin D deficiency        Past Surgical History:   Procedure Laterality Date    CATARACT REMOVAL WITH IMPLANT Bilateral     COLONOSCOPY  2010    COLPOSCOPY      DILATION AND CURETTAGE OF UTERUS      HEMORRHOID SURGERY  years ago    REPAIR RETINAL TEAR/HOLE Left     TONSILLECTOMY AND ADENOIDECTOMY      VITRECTOMY Left 2015       Family History   Problem Relation Age of Onset    Colon Cancer Mother     Hypertension Mother     Lung Cancer Father         Black Lung    Breast Cancer Sister     Lung Cancer Brother     Other Other         6 siblings, most  of infirmities of old age   Genna Recinos No Known Problems Sister     No Known Problems Sister     No Known Problems Brother     No Known Problems Brother        Social History  Patient lives at home alone, her sons help her that live nearby. Employment: none  Illicit drug use- denies  TOBACCO:   reports that she has never smoked. She has never used smokeless tobacco.  ETOH:   reports no history of alcohol use. Home Medications  Prior to Admission medications    Medication Sig Start Date End Date Taking? Authorizing Provider   hydrOXYzine (ATARAX) 25 MG tablet 1 or 2 tablets nightly 21   [place] L Corona, DO   lisinopril-hydroCHLOROthiazide (PRINZIDE;ZESTORETIC) 10-12.5 MG per tablet Take 1 tablet by mouth daily 21   [place] L Corona, DO   memantine (NAMENDA) 10 MG tablet Take 1 tablet by mouth 2 times daily 21   [place] L Corona, DO   vitamin D (CHOLECALCIFEROL) 1000 UNIT TABS tablet Take by mouth    Historical Provider, MD   aspirin 81 MG tablet Take by mouth    Historical Provider, MD   Vitamin E 100 UNITS TABS Take 1 capsule by mouth daily. Historical Provider, MD   Multiple Vitamins-Minerals (THERAPEUTIC MULTIVITAMIN-MINERALS) tablet Take 1 tablet by mouth daily. Historical Provider, MD       Allergies  Allergies   Allergen Reactions    Biaxin [Clarithromycin] Other (See Comments)     Many years ago, does not recall reaction    Pcn [Penicillins] Rash       Review of Systems  Please see HPI above. All bolded are positive. All un-bolded are negative.   Constitutional Symptoms: fever, chills, fatigue, generalized weakness, diaphoresis, increase in thirst, loss of appetite  Eyes: vision change   Ears, Nose, Mouth, Throat: hearing loss, nasal congestion, sores in the mouth  Cardiovascular: chest pain, chest heaviness, palpitations  Respiratory: shortness of breath, wheezing, coughing  Gastrointestinal: abdominal pain, nausea, vomiting, diarrhea, constipation, melena, hematochezia, hematemesis  Genitourinary: dysuria, hematuria, increased frequency, unable to void  Musculoskeletal: lower extremity edema, myalgias, arthralgias, back pain  Integumentary: rashes, itching   Neurological: headache, lightheadedness, dizziness, confusion, syncope, numbness, tingling, focal weakness  Psychiatric: depression, suicidal ideation, anxiety  Endocrine: unintentional weight change  Hematologic/Lymphatic: lymphadenopathy, easy bruising, easy bleeding   Allergic/Immunologic: recurrent infections      Objective  VITALS:  BP (!) 183/89   Pulse 69   Temp 97.5 °F (36.4 °C)   Resp 16   SpO2 98%     Physical Exam:  General: awake, alert, oriented to person, place, time, and purpose, appears stated age, cooperative, no acute distress, pleasant, tearful when I entered the room  Eyes: conjunctivae/corneas clear, sclera non icteric, EOMI  Ears: no obvious scars, no lesions, no masses, hearing intact  Mouth: mucous membranes moist, no obvious oral sores  Head: normocephalic, atraumatic  Neck: no JVD, no adenopathy, no thyromegaly, neck is supple, trachea is midline  Back: ROM normal, no CVA tenderness.   Chest: no pain on palpation  Lungs: clear to auscultation bilaterally, without rhonchi, crackle, wheezing, or rale, no retractions or use of accessory muscles  Heart: regular rate and regular rhythm, no murmur, normal S1, S2  Abdomen: soft, non-tender; bowel sounds normal; no masses, no organomegaly, padilla catheter with yellow urine  : Deferred   Extremities: trace-1+ extremity edema, extremities atraumatic, no cyanosis, no clubbing, 2+ pedal pulses palpated  Skin: normal color, normal texture, normal turgor, no rashes, no lesions  Neurologic:5/5 muscle strength throughout, normal muscle tone throughout, face symmetric, hearing intact, tongue midline, speech appropriate without slurring, sensation to fine touch intact in upper and lower extremities    Labs-   Lab Results   Component Value Date    WBC 3.6 (L) 07/06/2021    HGB 11.4 (L) 07/06/2021    HCT 33.7 (L) 07/06/2021     07/06/2021     (L) 07/06/2021    K 3.7 07/06/2021    CL 88 (L) 07/06/2021    CREATININE 0.8 07/06/2021    BUN 10 07/06/2021    CO2 30 (H) 07/06/2021    GLUCOSE 110 (H) 07/06/2021    ALT 62 (H) 07/06/2021     (H) 07/06/2021     No results found for: CKTOTAL, CKMB, CKMBINDEX, TROPONINI      Recent Radiological Studies:  CT HEAD WO CONTRAST   Final Result   No acute intracranial findings or significant change identified. Consider   MRI if symptoms persist.      Volume loss and findings suggestive of mild chronic microvascular ischemia. XR CHEST 1 VIEW   Final Result   Similar chronic appearing findings.   PA and lateral views would be useful for   further assessment, if symptoms persist.             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 Renal consult. Follow BMP per renal.  Noted/pending urine sodium/osmolarity/serum osmolarity/uric acid/TSH/cortisol. Free water restriction. Marquez. Metabolic encephalopathy with underlying dementia and hyponatremia: Treat hyponatremia. CT head negative. · Continue home medications other than HCTZ and Lisinopril. · PT/OT  · Follow labs  · DVT prophylaxis. · Please see orders for further management and care. ·  for discharge planning  · Discharge plan: TBD pending clinical improvement     Electronically signed by CHERYL Schultz CNP on 7/7/2021 at 9:11 AM    I can be reached through [email or web address]. NOTE:  This report was transcribed using voice recognition software. Every effort was made to ensure accuracy; however, inadvertent computerized transcription errors may be present. Addendum: I have personally participated in a face-to-face history and physical exam on the date of service with the patient. I have discussed the case with the nurse practitioner. I also participated in medical decision making on the date of service and I agree with all of the pertinent clinical information unless indicated in my editing of the note. I have reviewed and edited the note above based on my findings during my history, exam, and decision making on the same day of service. My additional thoughts:    IVF per nephrology   Free water restriction   Hold hydrochlorothiazide   IV hydralazine as needed for hypertension for now   Follow BMP   Marquez catheter    Electronically signed by Brittany Graham DO on 7/7/2021 at 11:36 AM    I can be reached through [email or web address].

## 2021-07-07 NOTE — ED NOTES
FIRST PROVIDER CONTACT ASSESSMENT NOTE        Department of Emergency Medicine            ED  First Provider Note            7/6/21  9:26 PM EDT    Chief Complaint: No chief complaint on file. History of Present Illness:    Pernell Gilford is a 80 y.o. female who presents to the emergency department for abdominal pain, leg swelling. Focused Screening Exam:  Constitutional:  Alert, appears stated age and is in no distress.   Heart regular rate and rhythm  Lungs clear    *ALLERGIES*     Biaxin [clarithromycin] and Pcn [penicillins]     ED Triage Vitals   BP Temp Temp src Pulse Resp SpO2 Height Weight   -- -- -- -- -- -- -- --        Initial Plan of Care:  Initiate Treatment-Testing, Proceed toTreatment Area When Bed Available for ED Attending/MLP to Continue Care    -----------------END OF FIRST PROVIDER CONTACT ASSESSMENT NOTE--------------  Electronically signed by SANDHYA Hammond   DD: 7/6/21     SANDHYA Hammond  07/07/21 4117

## 2021-07-07 NOTE — CONSULTS
[facility] MD Lexy Flowers MD Wynona Glory, MD Estill Sora, MD Stacie Boyers, JITENDRA Warner, ANDREW  Consultation  7/7/2021    Thank you for consult  Full note dictated, to follow  Briefly, 80 y.o. woman admitted with urinary retention, which she tells me \"happens from time to time for no reason. \" This recently she has had some anorexia, and has not been eating particularly well. She does try to keep up her fluid intake and drinks a fair amount of water, she tells me. Marquez catheter was placed, good urine output followed. Serum sodium presentation 125 mmol/L, improved 134 mmol/L as of this early afternoon    A/R:  1. 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  The rapid correction is least in part due to relief of the obstruction    Stop normal saline  1/2 ns gtt at conservative rate  Follow BMP will check again in 6 hours  Stop thiazide and do not resume   Continue supportive care    Lexy Birmingham MD, MD

## 2021-07-07 NOTE — CARE COORDINATION
7/7/2021  Social Work Discharge Planning: This worker met with Pt to discuss  role and transition of care/discharge planning. Pt resides in a one story home (1 step) alone and son Amy Bond lives one street away and is very supportive. Pt is on room air and uses no DME at home. Awaiting PT eval. Pharmacy is [facility] in [place] and PCP is Dr. Rosa Wilkes. Electronically signed by CECELIA Ponce on 7/7/2021 at 10:29 AM      7/7/2021  Social Work Discharge Planning:[facility] is 17/24. SABAS discussed [facility] vs [facility] and Pt declined both stating she is very independent at home. SABAS told Pt and her son that if they decide they want [facility] to let SW know and it can be arranged.  Electronically signed by CECELIA Ponce on 7/7/2021 at 3:02 PM

## 2021-07-07 NOTE — PROGRESS NOTES
Physical Therapy    Facility/Department: [address] INTERNAL MEDICINE 2  Initial Assessment    NAME: [place]  : 3/13/1932  MRN: [id number]    Date of Service: 2021    Patient Diagnosis(es): The primary encounter diagnosis was Hyponatremia. A diagnosis of Altered mental status, unspecified altered mental status type was also pertinent to this visit. has a past medical history of Dementia ([address] Epiretinal membrane, Hard of hearing, Hyperlipidemia, Hypertension, Macular degeneration, Osteoarthritis, and Vitamin D deficiency. has a past surgical history that includes Tonsillectomy and adenoidectomy; Dilation and curettage of uterus; Cataract removal with implant (Bilateral); repair retinal tear/hole (Left); Hemorrhoid surgery (years ago); vitrectomy (Left, 2015); Colonoscopy (2010); and Colposcopy. Referring provider:  Isabella Menendez DO    PT Order:  PT eval and treat     Evaluating PT:  Keaton Gomez PT, DPT PT [id number]    Room #:  1621/6675-F  Diagnosis:  Hyponatremia [E87.1]  Precautions:  Fall risk  Equipment Needs:  TBD    SUBJECTIVE:    Pt lives alone in a 1 story home with 7 stairs to enter and 1 rail. Bed and bath is on first floor. Pt ambulated with no device PTA. Pt reported her son's live close by and assist as needed. OBJECTIVE:   Initial Evaluation  Date:  Treatment Short Term/ Long Term   Goals   Was pt agreeable to Eval/treatment? yes     Does pt have pain?  Abdominal discomfort     Bed Mobility  Rolling: SBA  Supine to sit: SBA  Sit to supine: NT  Scooting: SBA to sitting EOB  independent   Transfers Sit to stand: Min A  Stand to sit: Min A  Stand pivot: Min A with w/w  independent   Ambulation    40 feet without device Min A   100+ feet with AAD if needed modified independent    Stair negotiation: ascended and descended  NT   7 steps with 1 rail SBA   ROM BLE:  WFL     Strength BLE:  Grossly 4/5  Grossly 4+/5   Balance Sitting EOB:  supervision  Dynamic Standing:  Min A without device  Sitting EOB:  independent  Dynamic Standing:  Independent with AAD   AM-PAC 6 Clicks 21/63       Pt is A & O x 3 but pt with some confusion throughout eval.   Sensation:  Pt denies numbness and tingling to extremities    Patient education  Pt educated on PT objectives during eval and while in the hospital, calling for assistance. Patient response to education:   Pt verbalized understanding Pt demonstrated skill Pt requires further education in this area   yes  yes     ASSESSMENT:    Conditions Requiring Skilled Therapeutic Intervention:    [x]Decreased strength     []Decreased ROM  [x]Decreased functional mobility  [x]Decreased balance   [x]Decreased endurance   []Decreased posture  []Decreased sensation  []Decreased coordination   []Decreased vision  []Decreased safety awareness   []Increased pain       Comments:  Pt found in bed. No report of dizziness during functional mobility. Mildly unsteady gait but no LOB. At end of eval, pt left sitting up in the chair with call light in reach and chair alarm on.      Pt's/ family goals   1. None stated    Prognosis is good for reaching above PT goals. Patient and or family understand(s) diagnosis, prognosis, and plan of care.   yes    PHYSICAL THERAPY PLAN OF CARE:    PT POC is established based on physician order and patient diagnosis     Diagnosis:  Hyponatremia [E87.1]    Current Treatment Recommendations:     [x] Strengthening to improve independence with functional mobility   [] ROM to improve independence with functional mobility   [x] Balance Training to improve static/dynamic balance and to reduce fall risk  [x] Endurance Training to improve activity tolerance during functional mobility   [x] Transfer Training to improve safety and independence with all functional transfers   [x] Gait Training to improve gait mechanics, endurance and assess need for appropriate assistive device  [x] Stair Training in preparation for safe discharge home and/or into the community   [] Positioning to prevent skin breakdown and contractures  [x] Safety and Education Training   [x] Patient/Caregiver Education   [] HEP  [] Other     PT long term treatment goals are located in above grid    Frequency of treatments: 2-5x/week x 1-2 weeks. Time in  0959  Time out  1011    Evaluation Time includes thorough review of current medical information, gathering information on past medical history/social history and prior level of function, completion of standardized testing/informal observation of tasks, assessment of data and education on plan of care and goals.     CPT codes:  [x] Low Complexity PT evaluation 89040  [] Moderate Complexity PT evaluation 53259  [] High Complexity PT evaluation 86378  [] PT Re-evaluation 99033  [] Therapeutic activities 16191 __minutes  [] Therapeutic exercises 93073 __ minutes      Shirley Nguyen, PT, DPT  PT [id number]

## 2021-07-08 ENCOUNTER — TELEPHONE (OUTPATIENT)
Dept: FAMILY MEDICINE CLINIC | Age: 86
End: 2021-07-08

## 2021-07-08 VITALS
TEMPERATURE: 98.4 F | RESPIRATION RATE: 18 BRPM | OXYGEN SATURATION: 97 % | WEIGHT: 124 LBS | HEIGHT: 62 IN | SYSTOLIC BLOOD PRESSURE: 165 MMHG | HEART RATE: 75 BPM | BODY MASS INDEX: 22.82 KG/M2 | DIASTOLIC BLOOD PRESSURE: 75 MMHG

## 2021-07-08 LAB
ALBUMIN SERPL-MCNC: 3.9 G/DL (ref 3.5–5.2)
ALP BLD-CCNC: 110 U/L (ref 35–104)
ALT SERPL-CCNC: 53 U/L (ref 0–32)
ANION GAP SERPL CALCULATED.3IONS-SCNC: 10 MMOL/L (ref 7–16)
ANION GAP SERPL CALCULATED.3IONS-SCNC: 6 MMOL/L (ref 7–16)
AST SERPL-CCNC: 79 U/L (ref 0–31)
BASOPHILS ABSOLUTE: 0.03 E9/L (ref 0–0.2)
BASOPHILS RELATIVE PERCENT: 0.6 % (ref 0–2)
BILIRUB SERPL-MCNC: 0.8 MG/DL (ref 0–1.2)
BUN BLDV-MCNC: 6 MG/DL (ref 6–23)
BUN BLDV-MCNC: 8 MG/DL (ref 6–23)
CALCIUM SERPL-MCNC: 9.1 MG/DL (ref 8.6–10.2)
CALCIUM SERPL-MCNC: 9.2 MG/DL (ref 8.6–10.2)
CHLORIDE BLD-SCNC: 98 MMOL/L (ref 98–107)
CHLORIDE BLD-SCNC: 98 MMOL/L (ref 98–107)
CO2: 24 MMOL/L (ref 22–29)
CO2: 28 MMOL/L (ref 22–29)
CREAT SERPL-MCNC: 0.6 MG/DL (ref 0.5–1)
CREAT SERPL-MCNC: 0.6 MG/DL (ref 0.5–1)
EOSINOPHILS ABSOLUTE: 0.14 E9/L (ref 0.05–0.5)
EOSINOPHILS RELATIVE PERCENT: 3 % (ref 0–6)
GFR AFRICAN AMERICAN: >60
GFR AFRICAN AMERICAN: >60
GFR NON-AFRICAN AMERICAN: >60 ML/MIN/1.73
GFR NON-AFRICAN AMERICAN: >60 ML/MIN/1.73
GLUCOSE BLD-MCNC: 114 MG/DL (ref 74–99)
GLUCOSE BLD-MCNC: 99 MG/DL (ref 74–99)
HCT VFR BLD CALC: 39.1 % (ref 34–48)
HEMOGLOBIN: 13 G/DL (ref 11.5–15.5)
IMMATURE GRANULOCYTES #: 0.01 E9/L
IMMATURE GRANULOCYTES %: 0.2 % (ref 0–5)
LYMPHOCYTES ABSOLUTE: 1.12 E9/L (ref 1.5–4)
LYMPHOCYTES RELATIVE PERCENT: 23.8 % (ref 20–42)
MAGNESIUM: 1.8 MG/DL (ref 1.6–2.6)
MAGNESIUM: 1.9 MG/DL (ref 1.6–2.6)
MCH RBC QN AUTO: 29.7 PG (ref 26–35)
MCHC RBC AUTO-ENTMCNC: 33.2 % (ref 32–34.5)
MCV RBC AUTO: 89.5 FL (ref 80–99.9)
MONOCYTES ABSOLUTE: 0.45 E9/L (ref 0.1–0.95)
MONOCYTES RELATIVE PERCENT: 9.6 % (ref 2–12)
NEUTROPHILS ABSOLUTE: 2.96 E9/L (ref 1.8–7.3)
NEUTROPHILS RELATIVE PERCENT: 62.8 % (ref 43–80)
PDW BLD-RTO: 13.9 FL (ref 11.5–15)
PHOSPHORUS: 2.7 MG/DL (ref 2.5–4.5)
PLATELET # BLD: 209 E9/L (ref 130–450)
PMV BLD AUTO: 9.3 FL (ref 7–12)
POTASSIUM REFLEX MAGNESIUM: 3.3 MMOL/L (ref 3.5–5)
POTASSIUM REFLEX MAGNESIUM: 4.3 MMOL/L (ref 3.5–5)
POTASSIUM SERPL-SCNC: 4.3 MMOL/L (ref 3.5–5)
RBC # BLD: 4.37 E12/L (ref 3.5–5.5)
SODIUM BLD-SCNC: 132 MMOL/L (ref 132–146)
SODIUM BLD-SCNC: 132 MMOL/L (ref 132–146)
TOTAL PROTEIN: 6.3 G/DL (ref 6.4–8.3)
WBC # BLD: 4.7 E9/L (ref 4.5–11.5)

## 2021-07-08 PROCEDURE — 6370000000 HC RX 637 (ALT 250 FOR IP): Performed by: INTERNAL MEDICINE

## 2021-07-08 PROCEDURE — 36415 COLL VENOUS BLD VENIPUNCTURE: CPT

## 2021-07-08 PROCEDURE — 96372 THER/PROPH/DIAG INJ SC/IM: CPT

## 2021-07-08 PROCEDURE — 51702 INSERT TEMP BLADDER CATH: CPT

## 2021-07-08 PROCEDURE — 80048 BASIC METABOLIC PNL TOTAL CA: CPT

## 2021-07-08 PROCEDURE — 85025 COMPLETE CBC W/AUTO DIFF WBC: CPT

## 2021-07-08 PROCEDURE — 6360000002 HC RX W HCPCS: Performed by: INTERNAL MEDICINE

## 2021-07-08 PROCEDURE — 2580000003 HC RX 258: Performed by: INTERNAL MEDICINE

## 2021-07-08 PROCEDURE — 97165 OT EVAL LOW COMPLEX 30 MIN: CPT

## 2021-07-08 PROCEDURE — G0378 HOSPITAL OBSERVATION PER HR: HCPCS

## 2021-07-08 PROCEDURE — 84100 ASSAY OF PHOSPHORUS: CPT

## 2021-07-08 PROCEDURE — 80053 COMPREHEN METABOLIC PANEL: CPT

## 2021-07-08 PROCEDURE — 83735 ASSAY OF MAGNESIUM: CPT

## 2021-07-08 RX ORDER — POTASSIUM CHLORIDE 20 MEQ/1
40 TABLET, EXTENDED RELEASE ORAL ONCE
Status: COMPLETED | OUTPATIENT
Start: 2021-07-08 | End: 2021-07-08

## 2021-07-08 RX ORDER — LISINOPRIL 20 MG/1
20 TABLET ORAL DAILY
Qty: 30 TABLET | Refills: 0 | Status: SHIPPED | OUTPATIENT
Start: 2021-07-08 | End: 2021-07-14 | Stop reason: SDUPTHER

## 2021-07-08 RX ADMIN — ASPIRIN 81 MG: 81 TABLET, CHEWABLE ORAL at 08:15

## 2021-07-08 RX ADMIN — Medication 1000 UNITS: at 08:15

## 2021-07-08 RX ADMIN — ENOXAPARIN SODIUM 40 MG: 40 INJECTION SUBCUTANEOUS at 08:15

## 2021-07-08 RX ADMIN — SODIUM CHLORIDE, PRESERVATIVE FREE 10 ML: 5 INJECTION INTRAVENOUS at 08:16

## 2021-07-08 RX ADMIN — SENNOSIDES 8.6 MG: 8.6 TABLET, FILM COATED ORAL at 09:51

## 2021-07-08 RX ADMIN — MEMANTINE HYDROCHLORIDE 10 MG: 10 TABLET, FILM COATED ORAL at 08:15

## 2021-07-08 RX ADMIN — POTASSIUM CHLORIDE 40 MEQ: 1500 TABLET, EXTENDED RELEASE ORAL at 02:45

## 2021-07-08 RX ADMIN — MULTIPLE VITAMINS W/ MINERALS TAB 1 TABLET: TAB at 08:15

## 2021-07-08 NOTE — PROGRESS NOTES
Internal Medicine Progress Note    Patient's name: [place]  : 3/13/1932  Chief complaints (on day of admission): Altered Mental Status, Urinary Retention (unsure of the last time she went to the bathroom, family states abdomen is swollen), and Leg Swelling  Admission date: 2021  Date of service: 2021   Room: [address] INTERNAL MEDICINE  Primary care physician: Daisy Hay DO  Reason for visit: Follow-up for hyponatremia     Joey Andino was seen and examined. She was resting comfortably in her chair. She had no complaints today. She was not confused. She was tearful at times and apologized for being upset yesterday. I told her that it was no problem. Nephrology was in the room when I saw her and they stated that she can go home today. Nephrology will follow up sodium level as an outpatient. I asked the patient multiple times about going to skilled nursing facility or excepting home health care and she refused both on multiple occasions. Review of Systems  There are no new complaints of chest pain, shortness of breath, abdominal pain, nausea, vomiting, diarrhea, constipation.     Hospital Medications  Current Facility-Administered Medications   Medication Dose Route Frequency Provider Last Rate Last Admin    therapeutic multivitamin-minerals 1 tablet  1 tablet Oral Daily Tomás Olivo, DO   1 tablet at 21    vitamin D (CHOLECALCIFEROL) tablet 1,000 Units  1,000 Units Oral Daily Tomás Olivo, DO   1,000 Units at 21    aspirin chewable tablet 81 mg  81 mg Oral Daily Tomás Olivo DO   81 mg at 21    hydrOXYzine (ATARAX) tablet 10 mg  10 mg Oral Nightly Tomás Olivo, DO   10 mg at 21    memantine (NAMENDA) tablet 10 mg  10 mg Oral BID Tomás Olivo, DO   10 mg at 21    sodium chloride flush 0.9 % injection 10 mL  10 mL Intravenous 2 times per day Tomás Olivo DO   10 mL at 2116    sodium chloride flush 0.9 % injection 10 mL  10 mL Intravenous PRN Tomás E Volino, DO        0.9 % sodium chloride infusion  25 mL Intravenous PRN Tomás E Volino, DO        potassium chloride (KLOR-CON M) extended release tablet 40 mEq  40 mEq Oral PRN Tomás E Volino, DO        Or    potassium bicarb-citric acid (EFFER-K) effervescent tablet 40 mEq  40 mEq Oral PRN Tomás E Volino, DO        Or    potassium chloride 10 mEq/100 mL IVPB (Peripheral Line)  10 mEq Intravenous PRN Tomás E Volino, DO        enoxaparin (LOVENOX) injection 40 mg  40 mg Subcutaneous Daily Tomás E Volino, DO   40 mg at 07/08/21 0815    ondansetron (ZOFRAN-ODT) disintegrating tablet 4 mg  4 mg Oral Q8H PRN Tomás E Volino, DO        Or    ondansetron (ZOFRAN) injection 4 mg  4 mg Intravenous Q6H PRN Tomás E Volino, DO        senna (SENOKOT) tablet 8.6 mg  1 tablet Oral Daily PRN Tomás E Volino, DO   8.6 mg at 07/08/21 0951    acetaminophen (TYLENOL) tablet 650 mg  650 mg Oral Q6H PRN Tomás E Volino, DO        Or    acetaminophen (TYLENOL) suppository 650 mg  650 mg Rectal Q6H PRN Tomás E Volino, DO        hydrALAZINE (APRESOLINE) injection 10 mg  10 mg Intravenous Q4H PRN Tomás E Volino, DO   10 mg at 07/07/21 2028       PRN Medications  sodium chloride flush, sodium chloride, potassium chloride **OR** potassium alternative oral replacement **OR** potassium chloride, ondansetron **OR** ondansetron, senna, acetaminophen **OR** acetaminophen, hydrALAZINE    Objective  Most Recent Recorded Vitals  BP (!) 165/75   Pulse 75   Temp 98.4 °F (36.9 °C) (Oral)   Resp 18   Ht 5' 2\" (1.575 m)   Wt 124 lb (56.2 kg)   SpO2 97%   BMI 22.68 kg/m²   I/O last 3 completed shifts:   In: 1160 [P.O.:280; I.V.:880]  Out: 3200 [Urine:3200]  I/O this shift:  In: 300 [P.O.:300]  Out: 800 [Urine:800]    Physical Exam:  General: AAO to person/place/time/purpose, NAD, no labored breathing, tearful at times  Eyes: conjunctivae/corneas clear, sclera non icteric  Skin: color/texture/turgor normal, no rashes or lesions  Lungs: CTAB, no retractions/use of accessory muscles, no vocal fremitus, no rhonchi, no crackle, no rales  Heart: regular rate, regular rhythm, no murmur  Abdomen: soft, NT, bowel sounds normal  Extremities: atraumatic, no edema  Neurologic: cranial nerves 2-12 grossly intact, no slurred speech    Most Recent Labs  Lab Results   Component Value Date    WBC 4.7 07/08/2021    HGB 13.0 07/08/2021    HCT 39.1 07/08/2021     07/08/2021     07/08/2021    K 4.3 07/08/2021    K 4.3 07/08/2021    CL 98 07/08/2021    CREATININE 0.6 07/08/2021    BUN 6 07/08/2021    CO2 24 07/08/2021    GLUCOSE 99 07/08/2021    ALT 53 (H) 07/08/2021    AST 79 (H) 07/08/2021    TSH 2.240 07/07/2021       CT HEAD WO CONTRAST   Final Result   No acute intracranial findings or significant change identified. Consider   MRI if symptoms persist.      Volume loss and findings suggestive of mild chronic microvascular ischemia. XR CHEST 1 VIEW   Final Result   Similar chronic appearing findings. PA and lateral views would be useful for   further assessment, if symptoms persist.             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 Renal following. Follow BMP per renal.  Noted/pending urine sodium/osmolarity/serum osmolarity/uric acid/TSH/cortisol. Free water restriction. Marquez. · Metabolic encephalopathy with underlying dementia and hyponatremia: Treat hyponatremia. CT head negative.   · Uncontrolled hypertension: Hydrochlorothiazide and lisinopril on hold. As needed IV hydralazine  · PT AM PAC 17/24-- pt declining HHC and [facility]  · Follow labs  · DVT prophylaxis. · Please see orders for further management and care. ·  for discharge planning  · Discharge plan: Home today    Electronically signed by Renato Mckinney DO on 7/8/2021 at 10:37 AM    I can be reached through [facility].

## 2021-07-08 NOTE — DISCHARGE SUMMARY
Internal Medicine Discharge Summary    NAME: [place] :  3/13/1932  MRN:  [id number] Jonatan Dubois DO    ADMITTED: 2021   DISCHARGED: 2021  1:56 PM    ADMITTING PHYSICIAN: Darlene Manuel DO    PCP: Flo Hathaway DO    CONSULTANT(S):   IP CONSULT TO SOCIAL WORK  IP CONSULT TO NEPHROLOGY  IP CONSULT TO HOME CARE NEEDS     ADMITTING DIAGNOSIS:   Hyponatremia [E87.1]     Please see H&P for further details    DISCHARGE DIAGNOSES:   Active Hospital Problems    Diagnosis     Hyponatremia [E87.1]      Priority: High    Hard of hearing [H91.90]     Macular degeneration [H35.30]     Vitamin D deficiency [E55.9]     Vascular dementia without behavioral disturbance ([address] [F01.50]     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 female patient of Flo Hathaway DO who  has a past medical history of Dementia ([address] Epiretinal membrane, Hard of hearing, Hyperlipidemia, Hypertension, Macular degeneration, Osteoarthritis, and Vitamin D deficiency. who originally had concerns including Altered Mental Status, Urinary Retention (unsure of the last time she went to the bathroom, family states abdomen is swollen), and Leg Swelling. at presentation on 2021, and was found to have Hyponatremia [E87.1] after workup. Please see H&P for further details. HOSPITAL COURSE:   The patient presented to the hospital with the chief complaint of Altered Mental Status, Urinary Retention (unsure of the last time she went to the bathroom, family states abdomen is swollen), and Leg Swelling. The patient was admitted to the hospital.     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 Renal following. Follow BMP per renal.  Noted/pending urine sodium/osmolarity/serum osmolarity/uric acid/TSH/cortisol. Free water restriction. Marquez.   · Metabolic encephalopathy with underlying dementia and hyponatremia: Treat hyponatremia.  CT head negative. · Uncontrolled hypertension: Hydrochlorothiazide and lisinopril on hold. As needed IV hydralazine  · PT AM PAC 17/24-- pt declining HHC and [facility]  · DC'ed home    BRIEF PHYSICAL EXAMINATION AND LABORATORIES ON DAY OF DISCHARGE:  VITALS:  BP (!) 165/75   Pulse 75   Temp 98.4 °F (36.9 °C) (Oral)   Resp 18   Ht 5' 2\" (1.575 m)   Wt 124 lb (56.2 kg)   SpO2 97%   BMI 22.68 kg/m²     Please see note from the same day. LABS[de-identified]  Recent Labs     07/07/21  1202 07/07/21  1804 07/08/21  0020 07/08/21  0637   NA  --  131* 132 132   K   < > 4.1 3.3* 4.3  4.3   CL  --  96* 98 98   CO2  --  26 28 24   BUN  --  9 8 6   CREATININE  --  0.7 0.6 0.6   GLUCOSE  --  123* 114* 99   CALCIUM  --  8.8 9.2 9.1    < > = values in this interval not displayed. Recent Labs     07/06/21  2343 07/08/21  0637   ALKPHOS 118* 110*   ALT 62* 53*   * 79*   PROT 6.4 6.3*   BILITOT 0.8 0.8   LABALBU 4.0 3.9     Recent Labs     07/06/21  2343 07/08/21  0637   WBC 3.6* 4.7   RBC 3.86 4.37   HGB 11.4* 13.0   HCT 33.7* 39.1   MCV 87.3 89.5   MCH 29.5 29.7   MCHC 33.8 33.2   RDW 13.8 13.9    209   MPV 9.1 9.3     No results found for: LABA1C  No results found for: INR, PROTIME   Lab Results   Component Value Date    TSH 2.240 07/07/2021    TSH 1.410 08/14/2020    TSH 0.966 08/27/2019     Lab Results   Component Value Date    TRIG 65 08/14/2020    TRIG 85 08/27/2019    TRIG 70 05/28/2019    HDL 92 08/14/2020    HDL 94 08/27/2019    HDL 94 05/28/2019    LDLCALC 108 (H) 08/14/2020    LDLCALC 112 (H) 08/27/2019    LDLCALC 123 (H) 05/28/2019     Recent Labs     07/08/21  0020 07/08/21  0637   MG 1.8 1.9       No results for input(s): CKTOTAL, CKMB, TROPONINI in the last 72 hours. No results for input(s): LACTA in the last 72 hours.     IMAGING:  CT HEAD WO CONTRAST    Result Date: 7/7/2021  EXAMINATION: CT OF THE HEAD WITHOUT CONTRAST 7/7/2021 12:29 am TECHNIQUE: CT of the head was performed without the administration of intravenous contrast. Dose modulation, iterative reconstruction, and/or weight based adjustment of the mA/kV was utilized to reduce the radiation dose to as low as reasonably achievable. COMPARISON: 04/01/2020 HISTORY: ORDERING SYSTEM PROVIDED HISTORY: altered mental status TECHNOLOGIST PROVIDED HISTORY: Has a \"code stroke\" or \"stroke alert\" been called? ->No Reason for exam:->altered mental status Decision Support Exception - unselect if not a suspected or confirmed emergency medical condition->Emergency Medical Condition (MA) FINDINGS: No intracranial hemorrhage, mass effect or midline shift. Basal cisterns are patent. Ventricles are not enlarged. Cortical volume loss. Areas of decreased attenuation in the bilateral white matter are nonspecific but likely due to chronic microvascular ischemia. Vascular calcifications. Partial opacification of bilateral mastoid air cells, similar to previous. Hyperostosis of the right maxillary sinus is unchanged consistent with chronic sinusitis. The osseous structures appear diffusely somewhat heterogeneous and osteopenic. This is similar to previous and favored to be incidental.  Developing marrow replacing process thought to be less likely. No acute intracranial findings or significant change identified. Consider MRI if symptoms persist. Volume loss and findings suggestive of mild chronic microvascular ischemia. XR CHEST 1 VIEW    Result Date: 7/6/2021  EXAMINATION: ONE XRAY VIEW OF THE CHEST 7/6/2021 10:41 pm COMPARISON: 07/22/2019 HISTORY: ORDERING SYSTEM PROVIDED HISTORY: sob TECHNOLOGIST PROVIDED HISTORY: Reason for exam:->sob FINDINGS: Heart size is normal.  Scattered vascular calcifications. Coarsened interstitial opacities are likely chronic. No pneumothorax. Apparent scarring in the lung bases appear similar to previous.   Degenerative changes are present in the spine and shoulders. Mild S-shaped scoliotic curvature of the thoracolumbar spine. Similar chronic appearing findings. PA and lateral views would be useful for further assessment, if symptoms persist.       MICROBIOLOGY:  BLOOD CX #1  Recent Labs     07/06/21  2343   BC 24 Hours no growth     BLOOD CX #2  Recent Labs     07/06/21  2343   BLOODCULT2 24 Hours no growth     TIP CULTURE  No results for input(s): CXCATHTIP in the last 72 hours. CULTURE, RESPIRATORY   No results for input(s): CULTRESP in the last 72 hours. RESPIRATORY SMEAR  No results for input(s): RESPSMEAR in the last 72 hours. DISPOSITION:  The patient's condition is fair. The patient is being discharged to home    DISCHARGE MEDICATIONS:    [facility] Medication Instructions [id number]    Printed on:07/08/21 1928   Medication Information                      aspirin 81 MG tablet  Take by mouth             hydrOXYzine (ATARAX) 25 MG tablet  1 or 2 tablets nightly             lisinopril (PRINIVIL;ZESTRIL) 20 MG tablet  Take 1 tablet by mouth daily             memantine (NAMENDA) 10 MG tablet  Take 1 tablet by mouth 2 times daily             Multiple Vitamins-Minerals (THERAPEUTIC MULTIVITAMIN-MINERALS) tablet  Take 1 tablet by mouth daily. vitamin D (CHOLECALCIFEROL) 1000 UNIT TABS tablet  Take by mouth             Vitamin E 100 UNITS TABS  Take 1 capsule by mouth daily.                  Discharge Medication List as of 7/8/2021 12:28 PM        Discharge Medication List as of 7/8/2021 12:28 PM      STOP taking these medications       lisinopril-hydroCHLOROthiazide (PRINZIDE;ZESTORETIC) 10-12.5 MG per tablet Comments:   Reason for Stopping:             Discharge Medication List as of 7/8/2021 12:28 PM      START taking these medications    Details   lisinopril (PRINIVIL;ZESTRIL) 20 MG tablet Take 1 tablet by mouth daily, Disp-30 tablet, R-0Normal             INTERNAL MEDICINE FOLLOW UP/INSTRUCTIONS:  · Follow-up with primary care physician as directed in discharge paperwork. · Please review results of imaging studies with PCP. · Follow-up with consultants as directed by them. · If recurrence or worsening of symptoms go to the ED or call primary care physician. · Diet: ADULT DIET; Regular; 1500 ml    Preparing for this patient's discharge, including paperwork, orders, instructions, and meeting with patient did required 34 minutes.     Electronically signed by Franklin Palmer DO on 7/8/2021 at 7:28 PM

## 2021-07-08 NOTE — PLAN OF CARE
Problem: Fluid Volume - Imbalance:  Goal: Absence of imbalanced fluid volume signs and symptoms  7/7/2021 2357 by Eugene Quick RN  Outcome: Met This Shift     Problem: Musculor/Skeletal Functional Status  Goal: Absence of falls  7/7/2021 2357 by Eugene Quick RN  Outcome: Met This Shift

## 2021-07-08 NOTE — PROGRESS NOTES
facilitate/challenge dynamic balance, stand tolerance for increased safety and independence with ADLs    Recommended Adaptive Equipment: TBD     Home Living: Pt lives alone in a single story home. Bathroom setup: tub/shower combo     Prior Level of Function: Pt is a questionable historian, pt reports being independent with ADLs, son assists PRN with IADLs; completed functional mobility with no AD. Driving: No.     Pain Level: no reported pain    Cognition: A&O: 3-4/4. Pt is alert and oriented but easily confused and confusion observed during session regarding situation. Pleasant and cooperative. Able to follow 1 step instruction. Requires cues for sequencing of tasks. Problem solving:  poor   Judgement/safety:  poor     Functional Assessment: AM-PAC Daily Activity Raw Score: 18/24   Initial Eval Status  Date: 7/8/21 Treatment session:  STGs=LTGS  Timeframe 10-14 days     Feeding Set up     Grooming Set up  [facility] up  Independent   LB Dressing SBA  Donning B socks seated in armchair  Mod I    Bathing Min A  Mod I   Toileting SBA  Mod I   Bed Mobility  Supine to sit: out of bed on entry     Functional Transfers STS: SBA  Mod I   Functional Mobility SBA with no AD  Occasional CGA with turns, mild unsteadiness  Household distance  Mod I during ADLs   Balance Sitting: fair plus    Standing: fair no AD     Activity Tolerance Fair  standing adrian x7-8 min with fair plus balance during self care tasks             Treatment: Patient educated on techniques for completion of ADL, safe functional transfers and functional mobility. Patient required cues for follow through with proper hand/foot placement, pacing, safety, compensatory strategies, breathing, attention, sequencing and technique in functional transfers, functional mobility and LB dressing in preparation for maximum independence in all self care tasks.      Hand Dominance: Right   Strength ROM Additional Info:    RUE  4-/5 WFL good  and FMC/dexterity noted during ADL tasks     LUE 4-/5 WFL good  and FMC/dexterity noted during ADL tasks         Hearing: [place]  Vision: functional in immediate environment  Sensation:  No c/o numbness or tingling   Tone: WFL   Edema: none                             Rehab Potential: Good for established goals     Patient/Family Goal: To get home. Patient and/or family were instructed on functional diagnosis, prognosis/goals and OT plan of care. Pt verbalized questionable understanding. Upon arrival, patient seated in reclining chair. At end of session, patient seated in reclining chair with call light and phone within reach, all lines and tubes intact. Pt would benefit from continued skilled OT to increase safety and independence with completion of ADL/IADL tasks for functional independence and quality of life.  Bed/chair alarm: ON    Low Evaluation 93097  Time In: 1039   Time Out: 1049  Total: 10 min      Evaluation time includes thorough review of current medical information, gathering information on past medical history/social history and prior level of function, completion of standardized testing/informal observation of tasks, assessment of data, and development of POC/Goals    Marita Allen OTR/L  [id number]

## 2021-07-08 NOTE — PLAN OF CARE
Problem: Fluid Volume - Imbalance:  Goal: Absence of imbalanced fluid volume signs and symptoms  7/7/2021 2357 by Jessica Arriaga RN  Outcome: Met This Shift     Problem: Musculor/Skeletal Functional Status  Goal: Absence of falls  7/7/2021 2357 by Jessica Arriaga RN  Outcome: Met This Shift

## 2021-07-08 NOTE — PROGRESS NOTES
209     Recent Labs     07/06/21  2343 07/07/21  1202 07/07/21  1804 07/08/21  0020 07/08/21  0637   *   < > 131* 132 132   K 3.7   < > 4.1 3.3* 4.3  4.3   CL 88*   < > 96* 98 98   CO2 30*   < > 26 28 24   MG  --   --   --  1.8 1.9   PHOS  --   --   --   --  2.7   BUN 10   < > 9 8 6   CREATININE 0.8   < > 0.7 0.6 0.6   ALT 62*  --   --   --  53*   *  --   --   --  79*   BILITOT 0.8  --   --   --  0.8   ALKPHOS 118*  --   --   --  110*    < > = values in this interval not displayed. No results found for: LABPROT    ASSESSMENT / RECOMMENDATIONS:     80 y.o. woman admitted with urinary retention, which she tells me \"happens from time to time for no reason. \" This recently she has had some anorexia, and has not been eating particularly well. She does try to keep up her fluid intake and drinks a fair amount of water, she tells me. Marquez catheter was placed, good urine output followed. Serum sodium presentation 125 mmol/L, improved 134 mmol/L as of this early afternoon -- slowed w/ 1/2 ns as rate of correction a little too rapid -- now back on track    1. 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  The rapid correction is least in part due to relief of the obstruction    Fluid restriction:  2500 cc per (not really restrictive -- just not excessive)  Stop thiazide and do not resume   Continue supportive care  [id number] [address] for d/c   F/u w/ me in 2 weeks    Electronically signed by Maximiliano Garcia MD on 7/8/2021

## 2021-07-08 NOTE — TELEPHONE ENCOUNTER
[address]      [phone]    [place] will be d/c from [facility] today. They are requesting Skilled Nursing, [address] Will you  Follow orders?

## 2021-07-08 NOTE — PLAN OF CARE
Problem: Fluid Volume - Imbalance:  Goal: Absence of imbalanced fluid volume signs and symptoms  Description: Absence of imbalanced fluid volume signs and symptoms  Outcome: Met This Shift     Problem: Musculor/Skeletal Functional Status  Goal: Absence of falls  Outcome: Met This Shift

## 2021-07-08 NOTE — PROGRESS NOTES
CLINICAL PHARMACY NOTE: MEDS TO BEDS    Total # of Prescriptions Filled: 1   The following medications were delivered to the patient:  · Lisinopril 20 mg    Additional Documentation:  rn signed for it

## 2021-07-09 ENCOUNTER — CARE COORDINATION (OUTPATIENT)
Dept: CASE MANAGEMENT | Age: 86
End: 2021-07-09

## 2021-07-09 DIAGNOSIS — E87.1 HYPONATREMIA: Primary | ICD-10-CM

## 2021-07-09 LAB — URINE CULTURE, ROUTINE: NORMAL

## 2021-07-09 PROCEDURE — 1111F DSCHRG MED/CURRENT MED MERGE: CPT | Performed by: FAMILY MEDICINE

## 2021-07-09 NOTE — CARE COORDINATION
[facility] Transitions Initial Follow Up Call    Call within 2 business days of discharge: Yes    Patient: [place] Patient : 3/13/1932   MRN: [id number]  Reason for Admission: Hyponatremia  Discharge Date: 21 RARS: Readmission Risk Score: 12      Last Discharge [facility]       Complaint Diagnosis Description Type Department Provider    21 Altered Mental Status; Urinary Retention; Leg Swelling Hyponatremia . .. ED to Hosp-Admission (Discharged) (ADMITTED) [address], DO; Ivan Soto. .. Spoke with: Euel Paget, patient's son on HIPAA    Facility: [facility]    Transitions of Care Initial Call      Challenges to be reviewed by the provider   Additional needs identified to be addressed with provider: No  none             Method of communication with provider : none      Advance Care Planning:   Does patient have an Advance Directive: decision maker updated. Was this a readmission? No  Patient stated reason for admission: abdominal pain and swelling; hyponatremia  Patients top risk factors for readmission: medical condition-dementia, htn, hld, hyponatremia    Care Transition Nurse (CTN) contacted the family by telephone to perform post hospital discharge assessment. Verified name and  with family as identifiers. Provided introduction to self, and explanation of the CTN role. CTN reviewed discharge instructions, medical action plan and red flags with family who verbalized understanding. Family given an opportunity to ask questions and does not have any further questions or concerns at this time. Were discharge instructions available to patient? Yes. Reviewed appropriate site of care based on symptoms and resources available to patient including: PCP, Specialist and Home health. The family agrees to contact the PCP office for questions related to their healthcare.      Medication reconciliation was performed with family, who verbalizes understanding of administration of home medications. CTN provided contact information. Plan for follow-up call in 5-7 days based on severity of symptoms and risk factors. Plan for next call: symptom management-abdominal pain, sob, edema, follow up appointment-Completed with Dr. Giulia Lacey and medication management-Med changes or questions. Non-face-to-face services provided:  Scheduled appointment with PCP-Verified with [facility] on 7/14/21 with Dr. Matthew Hughes. Patient's sons provide transportation. Scheduled appointment with Cristela Victoria, patient has appt with Dr. Karissa Canales - believes it is August 4, 2021. Obtained and reviewed discharge summary and/or continuity of care documents    Care Transitions 24 Hour Call    Do you have any ongoing symptoms?: Yes  Patient-reported symptoms: Other  Do you have a copy of your discharge instructions?: Yes  Do you have all of your prescriptions and are they filled?: Yes  Have you been contacted by a [facility] Pharmacist?: No  Have you scheduled your follow up appointment?: Yes  How are you going to get to your appointment?: Car - family or friend to transport  Were you discharged with any Home Care or Post Acute Services: Yes  Post Acute Services: Home Health (Comment: [address])  Do you feel like you have everything you need to keep you well at home?: Yes  Care Transitions Interventions  No Identified Needs       Spoke with patient's son for initial care transition call post hospital discharge. Med review completed; 1111F entered. Patient's son Nicanor Rai manages meds, not vitamins. Nicanor Rai keeps med bottles in his home and fills mediplanner weekly. Nicanor Rai lives one street over and [place] lives one mile away. Patient presented to the emergency department on 7/6/21 for abdominal pain and leg swelling. Per Nicanor Rai, patient denies abdominal pain and sob. Patient has a little abdominal edema, but has decreased. Per Nicanor Rai, no leg edema noted. Patient has a good appetite, eats a lot of fruit.   Patient drinks 36oz H2O daily. Referral made to dietician. Patient's son did speak to Dr. Amita Fernando regarding cause of hyponatremia. Patient's son denies any further needs, questions, or concerns at this time. Patient's son is agreeable to future follow up calls.           Follow Up  Future Appointments   Date Time Provider Department Center   7/14/2021  8:00 AM [place] DO [address] [facility]   8/16/2021  8:00 AM [place] Debora Doherty DO [address] [place] [address] [facility] RN

## 2021-07-12 ENCOUNTER — CARE COORDINATION (OUTPATIENT)
Dept: CARE COORDINATION | Age: 86
End: 2021-07-12

## 2021-07-12 LAB
BLOOD CULTURE, ROUTINE: NORMAL
CULTURE, BLOOD 2: NORMAL

## 2021-07-12 NOTE — CARE COORDINATION
RN noted to contact patient's son, Cuong Soares on Carbonated Content. RD contacted Cuong Soares and left voicemail regarding Dietitian referral. Left call back number and will follow up as appropriate.          [address] [facility], [address] [place] [address]

## 2021-07-13 NOTE — CARE COORDINATION
(50 kg) +-10%  %IBW: 124%     Physical Exam Findings:  Deferred    Nutrition Interview: RN noted to contact patient's son, Radha Christiansen on CallVU. RD called Radha Christiansen, explained reason for call and role in care. Radha Bankstz states patient eats a balanced diet with lots of fruits and vegetables. Per chart review, patient's diet order noted as Diet: ADULT DIET; Regular; 1500 ml. RD explained the importance of following a fluid restriction. Reviewed a fluid restricted diet- explained what a fluid is and foods and liquids that need to count towards daily fluid intake. Explained 1500 ml is equivalent to 50 ounces. Radha Kuldip states patient used to drink a ton of water and now is drinking 36 ounces of water daily. RD reiterated the importance of keeping track of patient's fluid intake closely. RD noted patient has HTN and reviewed HTN nutrition therapy. No nutrition related questions or concerns at this time. RD offered to mail educational handouts to pt to reinforce concepts discussed during phone conversation, Radha Kuldip accepted and very appreciative. Radha Christiansen prefers e-mail: [email or web address]    24-Hour Diet Recall  No examples of meals provided    Nutrition Diagnosis:  #1 Problem Food and nutrition-related knowledge deficit       Etiology related to lack of prior nutrition related education        Signs/Symptoms as evidenced by referral for diet education and fluid restriction     Nutrition Intervention:     Estimated Needs  cardiac diet providing 1400 kcals to promote wt maintenance ([address] based on CBW). Estimated daily CHO Needs: 193 g (based on 45-65% total calorie intake)  Estimated daily Protein Needs: 45-56 g (based on 0.8-1.0 g/kg based on CBW)  Estimated daily Fluid Needs: 1500 ml=50 ounces    #1 Nutrition Information: Provided patient with Fluid Restricted Diet and Hypertension Nutrition Therapy handouts. For reinforcement of concepts discussed during nutrition interview.      #2 Nutrition Counseling: Used open-ended questions to assess patients perceived susceptibility and severity of disease state. Discussed potential impact of health threat on patient's lifestyle. Used open-ended questions to assess patient's perception regarding benefits of and barriers to implementation of nutrition therapy. #3 Nutrition Education: Clearly defined the benefits of nutrition therapy. Summarized and affirmed positive aspects of current nutrition patterns. Provided education regarding value of adherence to cardiac diet. Discussed ways to establish applying concepts of alternatives and choices regarding implementation of diet. Explored ideas for small, incremental goals to initiate behavior change. Monitoring and Evaluation:    Indicator/Goal Criteria   #1 Eat balanced meals consistently throughout the day. #1 Focus on eating 3 meals/day and make these meals balanced using the MyPlate DXZGTURUN-9/0 plate fruits and/or vegetables, 1/4 plate protein and 1/4 plate starchy carbohydrates with 8 oz glass of low fat milk if desired. #2 Keep fluid intake to no more than 1500 ml per day. #2 Track fluid intake closely and utilize fluid restricted diet handout provided. Follow Up: RD will call pt in 2 weeks to follow up and make sure pt received handouts in mail. RD will answer any nutrition related questions at this time.      [address] [facility], [facility] [address]

## 2021-07-14 ENCOUNTER — OFFICE VISIT (OUTPATIENT)
Dept: FAMILY MEDICINE CLINIC | Age: 86
End: 2021-07-14
Payer: MEDICARE

## 2021-07-14 VITALS
RESPIRATION RATE: 17 BRPM | OXYGEN SATURATION: 96 % | SYSTOLIC BLOOD PRESSURE: 124 MMHG | HEART RATE: 69 BPM | DIASTOLIC BLOOD PRESSURE: 70 MMHG | TEMPERATURE: 97.3 F | WEIGHT: 119 LBS | BODY MASS INDEX: 21.77 KG/M2

## 2021-07-14 DIAGNOSIS — E78.2 MIXED HYPERLIPIDEMIA: ICD-10-CM

## 2021-07-14 DIAGNOSIS — E87.1 HYPONATREMIA: ICD-10-CM

## 2021-07-14 DIAGNOSIS — I10 ESSENTIAL HYPERTENSION: Primary | ICD-10-CM

## 2021-07-14 DIAGNOSIS — F01.50 VASCULAR DEMENTIA WITHOUT BEHAVIORAL DISTURBANCE (HCC): ICD-10-CM

## 2021-07-14 PROCEDURE — 1111F DSCHRG MED/CURRENT MED MERGE: CPT | Performed by: FAMILY MEDICINE

## 2021-07-14 PROCEDURE — 99495 TRANSJ CARE MGMT MOD F2F 14D: CPT | Performed by: FAMILY MEDICINE

## 2021-07-14 RX ORDER — LISINOPRIL 20 MG/1
20 TABLET ORAL DAILY
Qty: 90 TABLET | Refills: 1 | Status: SHIPPED
Start: 2021-07-14 | End: 2021-11-15 | Stop reason: SDUPTHER

## 2021-07-14 SDOH — ECONOMIC STABILITY: FOOD INSECURITY: WITHIN THE PAST 12 MONTHS, THE FOOD YOU BOUGHT JUST DIDN'T LAST AND YOU DIDN'T HAVE MONEY TO GET MORE.: PATIENT DECLINED

## 2021-07-14 SDOH — ECONOMIC STABILITY: FOOD INSECURITY: WITHIN THE PAST 12 MONTHS, YOU WORRIED THAT YOUR FOOD WOULD RUN OUT BEFORE YOU GOT MONEY TO BUY MORE.: PATIENT DECLINED

## 2021-07-14 ASSESSMENT — ENCOUNTER SYMPTOMS
BLOOD IN STOOL: 0
SHORTNESS OF BREATH: 0
SORE THROAT: 0
COUGH: 0
DIARRHEA: 0
EYE REDNESS: 0
EYE PAIN: 0
SINUS PAIN: 0
ALLERGIC/IMMUNOLOGIC NEGATIVE: 1
EYE DISCHARGE: 0
ABDOMINAL PAIN: 0
NAUSEA: 0
TROUBLE SWALLOWING: 0
CHEST TIGHTNESS: 0
BACK PAIN: 0
PHOTOPHOBIA: 0
VOMITING: 0

## 2021-07-14 ASSESSMENT — SOCIAL DETERMINANTS OF HEALTH (SDOH): HOW HARD IS IT FOR YOU TO PAY FOR THE VERY BASICS LIKE FOOD, HOUSING, MEDICAL CARE, AND HEATING?: NOT HARD AT ALL

## 2021-07-14 NOTE — PROGRESS NOTES
Post-Discharge Transitional Care Management Services or Hospital Follow Up      [place]   YOB: 1932    Date of Office Visit:  7/14/2021  Date of Hospital Admission: 7/6/21  Date of Hospital Discharge: 7/8/21  Readmission Risk Score(high >=14%. Medium >=10%):Readmission Risk Score: 12      Care management risk score Rising risk (score 2-5) and Complex Care (Scores >=6): 1     Non face to face  following discharge, date last encounter closed (first attempt may have been earlier): 7/9/2021  4:40 PM 7/9/2021  4:40 PM    Call initiated 2 business days of discharge: Yes     Patient Active Problem List   Diagnosis    Vascular dementia without behavioral disturbance ([facility] [address]    Hypertension    Vitamin D deficiency    Primary osteoarthritis involving multiple joints    Hyperlipidemia    Macular degeneration    Hyponatremia    Hard of hearing       Allergies   Allergen Reactions    Biaxin [Clarithromycin] Other (See Comments)     Many years ago, does not recall reaction    Pcn [Penicillins] Rash       Medications listed as ordered at the time of discharge from hospital   [facility], [address] [facility] [facility] Medication Instructions MARIA T:    Printed on:07/14/21 0900   Medication Information                      aspirin 81 MG tablet  Take by mouth             hydrOXYzine (ATARAX) 25 MG tablet  1 or 2 tablets nightly             lisinopril (PRINIVIL;ZESTRIL) 20 MG tablet  Take 1 tablet by mouth daily             memantine (NAMENDA) 10 MG tablet  Take 1 tablet by mouth 2 times daily             Multiple Vitamins-Minerals (THERAPEUTIC MULTIVITAMIN-MINERALS) tablet  Take 1 tablet by mouth daily. vitamin D (CHOLECALCIFEROL) 1000 UNIT TABS tablet  Take 1,000 Units by mouth daily              Vitamin E 100 UNITS TABS  Take 1 capsule by mouth daily.                    Medications marked \"taking\" at this time  Outpatient Medications Marked as Taking for the 7/14/21 encounter (Office Visit) with Cresencio Beckman Corona, DO   Medication Sig Dispense Refill    lisinopril (PRINIVIL;ZESTRIL) 20 MG tablet Take 1 tablet by mouth daily 30 tablet 0    hydrOXYzine (ATARAX) 25 MG tablet 1 or 2 tablets nightly (Patient taking differently: Take 50 mg by mouth nightly ) 180 tablet 1    memantine (NAMENDA) 10 MG tablet Take 1 tablet by mouth 2 times daily 180 tablet 1    vitamin D (CHOLECALCIFEROL) 1000 UNIT TABS tablet Take 1,000 Units by mouth daily       aspirin 81 MG tablet Take by mouth      Vitamin E 100 UNITS TABS Take 1 capsule by mouth daily.  Multiple Vitamins-Minerals (THERAPEUTIC MULTIVITAMIN-MINERALS) tablet Take 1 tablet by mouth daily. Medications patient taking as of now reconciled against medications ordered at time of hospital discharge: Yes    Chief Complaint   Patient presents with   [facility] Management       The patient is here for blood pressure check. Patient recently hospitalized for hyponatremia. Medications were changed from lisinopril hydrochlorothiazide to lisinopril straight. Discharge sodium was up to 132. . No headache or visual disturbances. No dizziness or tinnitus. No chest pain, palpitations, or dyspnea. No nausea and vomiting. No numbness, tingling and or weakness to the extremities. No fevers or chills. No recent illness. Inpatient course: Discharge summary reviewed- see chart. Interval history/Current status: She is improved from her will stay. Apparently fluid overloaded and sodium was decreased. Her confusion was worse. Her hospitalization, fluids were restricted and medications were slightly adjusted with urine of sodium back to baseline. Status also returned to baseline as well. Review of Systems   Constitutional: Negative for appetite change, fatigue and unexpected weight change. HENT: Negative for congestion, ear pain, hearing loss, sinus pain, sore throat and trouble swallowing. Eyes: Negative for photophobia, pain, discharge and redness.    Respiratory: Negative for cough, chest tightness and shortness of breath. Cardiovascular: Negative for chest pain, palpitations and leg swelling. Gastrointestinal: Negative for abdominal pain, blood in stool, diarrhea, nausea and vomiting. Endocrine: Negative. Genitourinary: Negative for dysuria, flank pain, frequency and hematuria. Musculoskeletal: Negative for arthralgias, back pain, joint swelling and myalgias. Skin: Negative. Allergic/Immunologic: Negative. Neurological: Negative for dizziness, seizures, syncope, weakness, light-headedness, numbness and headaches. Hematological: Negative for adenopathy. Does not bruise/bleed easily. Psychiatric/Behavioral: Negative. Vitals:    07/14/21 0818   BP: 124/70   Pulse: 69   Resp: 17   Temp: 97.3 °F (36.3 °C)   TempSrc: Temporal   SpO2: 96%   Weight: 119 lb (54 kg)     Body mass index is 21.77 kg/m². Wt Readings from Last 3 Encounters:   07/14/21 119 lb (54 kg)   07/08/21 124 lb (56.2 kg)   05/14/21 120 lb (54.4 kg)     BP Readings from Last 3 Encounters:   07/14/21 124/70   07/08/21 (!) 165/75   05/14/21 138/80       Physical Exam  Vitals and nursing note reviewed. Constitutional:       Appearance: She is well-developed. HENT:      Head: Atraumatic. Right Ear: External ear normal.      Left Ear: External ear normal.      Nose: Nose normal.      Mouth/Throat:      Pharynx: No oropharyngeal exudate. Eyes:      Conjunctiva/sclera: Conjunctivae normal.      Pupils: Pupils are equal, round, and reactive to light. Neck:      Thyroid: No thyromegaly. Trachea: No tracheal deviation. Cardiovascular:      Rate and Rhythm: Normal rate and regular rhythm. Heart sounds: No murmur heard. No friction rub. No gallop. Pulmonary:      Effort: Pulmonary effort is normal. No respiratory distress. Breath sounds: Normal breath sounds. Abdominal:      General: Bowel sounds are normal.      Palpations: Abdomen is soft. Musculoskeletal:         General: No tenderness or deformity. Normal range of motion. Cervical back: Normal range of motion and neck supple. Comments: Generalized osteoarthritis. Lymphadenopathy:      Cervical: No cervical adenopathy. Skin:     General: Skin is warm and dry. Capillary Refill: Capillary refill takes less than 2 seconds. Findings: No rash. Neurological:      Mental Status: She is alert and oriented to person, place, and time. Sensory: No sensory deficit. Motor: No abnormal muscle tone. Coordination: Coordination normal.      Deep Tendon Reflexes: Reflexes normal.             Assessment/Plan:  1. Essential hypertension  Blood pressure well controlled on current medication which is lisinopril plain. 2. Hyponatremia  Resolved, discharge sodium was 132.  - ME DISCHARGE MEDS RECONCILED W/ CURRENT OUTPATIENT MED LIST    3. Vascular dementia without behavioral disturbance ([facility] [address]  This is back to baseline    4.  Mixed hyperlipidemia  Stable  - ME DISCHARGE MEDS RECONCILED W/ CURRENT OUTPATIENT MED LIST        Medical Decision Making: moderate complexity

## 2021-07-16 ENCOUNTER — CARE COORDINATION (OUTPATIENT)
Dept: CASE MANAGEMENT | Age: 86
End: 2021-07-16

## 2021-07-16 NOTE — CARE COORDINATION
[facility] Transitions Follow Up Call    2021    Patient: [place]  Patient : 3/13/1932   MRN: <[id number]>  Reason for Admission:  Hyponatremia  Discharge Date: 21 RARS: Readmission Risk Score: 12         Spoke with: FLORENTINO son Mariangel Hernandez who states everything is going well he denies patient has complained of Chest pain, SOB, dizziness. She is eating and drinking well and having normal bowel and bladder elimination. Patient had a PCP follow up on the  with no new orders she has a Nephrology appointment . No concerns noted. Care Transitions Follow Up Call    Needs to be reviewed by the provider   Additional needs identified to be addressed with provider: No  none             Method of communication with provider : none      Care Transition Nurse (CTN) contacted the family by telephone to follow up after admission on 2021. Verified name and  with family as identifiers. Addressed changes since last contact: none  Discussed follow-up appointments. If no appointment was previously scheduled, appointment scheduling offered: Yes. Is follow up appointment scheduled within 7 days of discharge? Yes. Advance Care Planning:   Does patient have an Advance Directive: reviewed and needs to be updated, not on file; education provided, not on file, patient declined education, decision maker updated and referral to internal ACP facilitator. CTN reviewed discharge instructions, medical action plan and red flags with patient and discussed any barriers to care and/or understanding of plan of care after discharge. Discussed appropriate site of care based on symptoms and resources available to patient including: PCP, Specialist, Home health and When to call 911. The family agrees to contact the PCP office for questions related to their healthcare.      Patients top risk factors for readmission: functional physical ability and lack of knowledge about disease  Interventions to address risk factors: Obtained and reviewed discharge summary and/or continuity of care documents      Non-[facility] follow up appointment(s):     CTN provided contact information for future needs. No further follow-up call indicated based on severity of symptoms and risk factors. Plan for next call: NONE            Care Transitions Subsequent and Final Call    Subsequent and Final Calls  Do you have any ongoing symptoms?: No  Have your medications changed?: No  Do you have any questions related to your medications?: No  Do you currently have any active services?: Yes  Are you currently active with any services?: Home Health  Do you have any needs or concerns that I can assist you with?: No  Identified Barriers: Lack of Education  Care Transitions Interventions    Registered Dietician: Completed    Other Interventions:            Follow Up  Future Appointments   Date Time Provider Jonatan Lopez   8/16/2021  8:00 AM Bry Desai       [place], [place]

## 2021-07-26 ENCOUNTER — CARE COORDINATION (OUTPATIENT)
Dept: CARE COORDINATION | Age: 86
End: 2021-07-26

## 2021-07-26 NOTE — CARE COORDINATION
[facility]  7/26/2021    Registered Dietitian Progress Note for Care Coordination    Assessment: [place] is a 80 y.o. female. RD referred for Hyponatremia and HTN. RN noted to contact patient's son, Radha Christiansen on Novaled. RD spoke with Radha Christiansen for initial nutrition assessment on 7/13. RD called to follow up today 7/26 and spoke to Radha Christiansen. Radha Christiansen states he received the educational handouts sent to e-mail and found the information helpful- Radha Christiansen explains he shared the handouts with his mom. RD discussed previous goals. Pt is eating balanced meals and following a fluid restriction closely. Radha Kuldip states patient had an appointment with PCP on 7/14 and is going to see a nephrologist next week. No nutrition related questions or concerns at this time. Barriers to meeting goals: overwhelmed by complexity of regimen    Action:  Reiterated the importance of eating balanced meals throughout the day. Reiterated the importance of following fluid restriction closely and keeping track of intake daily. See assessment note above. Nutrition Monitoring and Evaluation  Indicator/Goal Criteria Progress   #1 Eat balanced meals consistently throughout the day. #1 Focus on eating 3 meals/day and make these meals balanced using the MyPlate [id number]-0/3 plate fruits and/or vegetables, 1/4 plate protein and 1/4 plate starchy carbohydrates with 8 oz glass of low fat milk if desired. #1 Pt is eating balanced meals consistently. #2  Keep fluid intake to no more than 1500 ml per day. #2 Track fluid intake closely and utilize fluid restricted diet handout provided. #2 Pt is following fluid restriction and tracking fluid intake closely. Plan of Care:  RD encouraged pt to keep working toward goals set. RD explained this is final follow up call and provided contact information. Encouraged pt to call RD in future with any nutrition related questions or concerns. Follow Up:    Final follow up call today 7/26/21.  RD will continue to follow/assist with patient return call.         [facility] [facility], [address] [place] [address]  None

## 2021-07-28 ASSESSMENT — ENCOUNTER SYMPTOMS
VOMITING: 0
BACK PAIN: 0
EYE REDNESS: 0
NAUSEA: 0
CONSTIPATION: 0
ABDOMINAL DISTENTION: 0
EYE DISCHARGE: 0
SHORTNESS OF BREATH: 0
WHEEZING: 0
BELCHING: 0
COUGH: 0
EYE PAIN: 0
SINUS PRESSURE: 0
SORE THROAT: 0
DIARRHEA: 0
ABDOMINAL PAIN: 1

## 2021-07-28 NOTE — ED PROVIDER NOTES
80-year-old female presents to the emergency department with confusion leg swelling and urinary retention. Family is concerned that her abdomen is swollen. States no fevers chills nausea vomiting diarrhea abdominal pain. Family is concerned she is more bloated. She states no other complaints. The history is provided by the patient. Abdominal Pain  Pain location:  Suprapubic  Pain quality: aching and bloating    Pain radiates to:  Does not radiate  Pain severity:  Mild  Onset quality:  Gradual  Duration:  2 days  Timing:  Intermittent  Progression:  Waxing and waning  Chronicity:  New  Relieved by:  Nothing  Worsened by:  Nothing  Ineffective treatments:  None tried  Associated symptoms: no anorexia, no belching, no chest pain, no chills, no constipation, no cough, no diarrhea, no dysuria, no fatigue, no fever, no nausea, no shortness of breath, no sore throat and no vomiting         Review of Systems   Constitutional: Negative for chills, fatigue and fever. HENT: Negative for ear pain, sinus pressure and sore throat. Eyes: Negative for pain, discharge and redness. Respiratory: Negative for cough, shortness of breath and wheezing. Cardiovascular: Positive for leg swelling. Negative for chest pain. Gastrointestinal: Positive for abdominal pain. Negative for abdominal distention, anorexia, constipation, diarrhea, nausea and vomiting. Genitourinary: Negative for dysuria and frequency. Musculoskeletal: Negative for arthralgias and back pain. Skin: Negative for rash and wound. Neurological: Negative for weakness and headaches. Hematological: Negative for adenopathy. Psychiatric/Behavioral: Positive for confusion. All other systems reviewed and are negative. Physical Exam  Constitutional:       Appearance: She is well-developed. HENT:      Head: Normocephalic and atraumatic. Eyes:      General: No visual field deficit.   Cardiovascular:      Rate and Rhythm: Normal rate and regular rhythm. Heart sounds: Normal heart sounds. Pulmonary:      Effort: Pulmonary effort is normal.      Breath sounds: Normal breath sounds. Abdominal:      General: Bowel sounds are normal. There is distension. Palpations: Abdomen is soft. Musculoskeletal:         General: Normal range of motion. Cervical back: Normal range of motion and neck supple. Right lower le+ Edema present. Left lower le+ Edema present. Skin:     General: Skin is warm. Neurological:      Mental Status: She is alert. She is confused. Cranial Nerves: No cranial nerve deficit, dysarthria or facial asymmetry. Psychiatric:         Mood and Affect: Mood normal.         Behavior: Behavior normal.          Procedures     MDM  Number of Diagnoses or Management Options  Altered mental status, unspecified altered mental status type  Hyponatremia  Diagnosis management comments: Patient seen and examined. Labs and imaging were ordered. Patient found to be hyponatremic after rest of work-up was found to be reassuring a Marquez was placed in the patient for abdominal distention and urinary retention. She was admitted to Dr. Olivia Wetzel for further care.             --------------------------------------------- PAST HISTORY ---------------------------------------------  Past Medical History:  has a past medical history of Dementia ([facility] [address] Epiretinal membrane, Hard of hearing, Hyperlipidemia, Hypertension, Macular degeneration, Osteoarthritis, and Vitamin D deficiency. Past Surgical History:  has a past surgical history that includes Tonsillectomy and adenoidectomy; Dilation and curettage of uterus; Cataract removal with implant (Bilateral); repair retinal tear/hole (Left); Hemorrhoid surgery (years ago); vitrectomy (Left, 2015); Colonoscopy (2010); and Colposcopy. Social History:  reports that she has never smoked.  She has never used smokeless tobacco. She reports that she does not drink alcohol and does not use drugs. Family History: family history includes Breast Cancer in her sister; Colon Cancer in her mother; Hypertension in her mother; Lung Cancer in her brother and father; No Known Problems in her brother, brother, sister, and sister; Other in an other family member. The patients home medications have been reviewed.     Allergies: Biaxin [clarithromycin] and Pcn [penicillins]    -------------------------------------------------- RESULTS -------------------------------------------------    LABS:  Results for orders placed or performed during the hospital encounter of 07/06/21   Culture, Blood 1    Specimen: Blood   Result Value Ref Range    Blood Culture, Routine 5 Days no growth    Culture, Blood 2    Specimen: Blood   Result Value Ref Range    Culture, Blood 2 5 Days no growth    Culture, Urine    Specimen: Urine, clean catch   Result Value Ref Range    Urine Culture, Routine <10,000 CFU/mL  Mixed gram positive organisms      Lactate, Sepsis   Result Value Ref Range    Lactic Acid, Sepsis 0.8 0.5 - 1.9 mmol/L   CBC auto differential   Result Value Ref Range    WBC 3.6 (L) 4.5 - 11.5 E9/L    RBC 3.86 3.50 - 5.50 E12/L    Hemoglobin 11.4 (L) 11.5 - 15.5 g/dL    Hematocrit 33.7 (L) 34.0 - 48.0 %    MCV 87.3 80.0 - 99.9 fL    MCH 29.5 26.0 - 35.0 pg    MCHC 33.8 32.0 - 34.5 %    RDW 13.8 11.5 - 15.0 fL    Platelets 405 540 - 719 E9/L    MPV 9.1 7.0 - 12.0 fL    Neutrophils % 57.4 43.0 - 80.0 %    Immature Granulocytes % 0.3 0.0 - 5.0 %    Lymphocytes % 25.6 20.0 - 42.0 %    Monocytes % 11.7 2.0 - 12.0 %    Eosinophils % 4.4 0.0 - 6.0 %    Basophils % 0.6 0.0 - 2.0 %    Neutrophils Absolute 2.07 1.80 - 7.30 E9/L    Immature Granulocytes # 0.01 E9/L    Lymphocytes Absolute 0.92 (L) 1.50 - 4.00 E9/L    Monocytes Absolute 0.42 0.10 - 0.95 E9/L    Eosinophils Absolute 0.16 0.05 - 0.50 E9/L    Basophils Absolute 0.02 0.00 - 0.20 E9/L   Comprehensive Metabolic Panel w/ Reflex to MG   Result Glucose 134 (H) 74 - 99 mg/dL    BUN 9 6 - 23 mg/dL    CREATININE 0.7 0.5 - 1.0 mg/dL    GFR Non-African American >60 >=60 mL/min/1.73    GFR African American >60     Calcium 9.2 8.6 - 10.2 mg/dL   Sodium, urine, random   Result Value Ref Range    Sodium, Ur 57 Not Established mmol/L   Chloride, urine, random   Result Value Ref Range    Chloride 45 Not Established mmol/L   Basic Metabolic Panel w/ Reflex to MG   Result Value Ref Range    Sodium 132 132 - 146 mmol/L    Potassium reflex Magnesium 3.3 (L) 3.5 - 5.0 mmol/L    Chloride 98 98 - 107 mmol/L    CO2 28 22 - 29 mmol/L    Anion Gap 6 (L) 7 - 16 mmol/L    Glucose 114 (H) 74 - 99 mg/dL    BUN 8 6 - 23 mg/dL    CREATININE 0.6 0.5 - 1.0 mg/dL    GFR Non-African American >60 >=60 mL/min/1.73    GFR African American >60     Calcium 9.2 8.6 - 10.2 mg/dL   Basic Metabolic Panel   Result Value Ref Range    Sodium 131 (L) 132 - 146 mmol/L    Potassium 4.1 3.5 - 5.0 mmol/L    Chloride 96 (L) 98 - 107 mmol/L    CO2 26 22 - 29 mmol/L    Anion Gap 9 7 - 16 mmol/L    Glucose 123 (H) 74 - 99 mg/dL    BUN 9 6 - 23 mg/dL    CREATININE 0.7 0.5 - 1.0 mg/dL    GFR Non-African American >60 >=60 mL/min/1.73    GFR African American >60     Calcium 8.8 8.6 - 10.2 mg/dL   Comprehensive Metabolic Panel w/ Reflex to MG   Result Value Ref Range    Sodium 132 132 - 146 mmol/L    Potassium reflex Magnesium 4.3 3.5 - 5.0 mmol/L    Chloride 98 98 - 107 mmol/L    CO2 24 22 - 29 mmol/L    Anion Gap 10 7 - 16 mmol/L    Glucose 99 74 - 99 mg/dL    BUN 6 6 - 23 mg/dL    CREATININE 0.6 0.5 - 1.0 mg/dL    GFR Non-African American >60 >=60 mL/min/1.73    GFR African American >60     Calcium 9.1 8.6 - 10.2 mg/dL    Total Protein 6.3 (L) 6.4 - 8.3 g/dL    Albumin 3.9 3.5 - 5.2 g/dL    Total Bilirubin 0.8 0.0 - 1.2 mg/dL    Alkaline Phosphatase 110 (H) 35 - 104 U/L    ALT 53 (H) 0 - 32 U/L    AST 79 (H) 0 - 31 U/L   CBC auto differential   Result Value Ref Range    WBC 4.7 4.5 - 11.5 E9/L    RBC 4.37 3.50 - 5.50 E12/L    Hemoglobin 13.0 11.5 - 15.5 g/dL    Hematocrit 39.1 34.0 - 48.0 %    MCV 89.5 80.0 - 99.9 fL    MCH 29.7 26.0 - 35.0 pg    MCHC 33.2 32.0 - 34.5 %    RDW 13.9 11.5 - 15.0 fL    Platelets 506 039 - 300 E9/L    MPV 9.3 7.0 - 12.0 fL    Neutrophils % 62.8 43.0 - 80.0 %    Immature Granulocytes % 0.2 0.0 - 5.0 %    Lymphocytes % 23.8 20.0 - 42.0 %    Monocytes % 9.6 2.0 - 12.0 %    Eosinophils % 3.0 0.0 - 6.0 %    Basophils % 0.6 0.0 - 2.0 %    Neutrophils Absolute 2.96 1.80 - 7.30 E9/L    Immature Granulocytes # 0.01 E9/L    Lymphocytes Absolute 1.12 (L) 1.50 - 4.00 E9/L    Monocytes Absolute 0.45 0.10 - 0.95 E9/L    Eosinophils Absolute 0.14 0.05 - 0.50 E9/L    Basophils Absolute 0.03 0.00 - 0.20 M9/K   Basic Metabolic Panel   Result Value Ref Range    Potassium 4.3 3.5 - 5.0 mmol/L   Magnesium   Result Value Ref Range    Magnesium 1.9 1.6 - 2.6 mg/dL   Phosphorus   Result Value Ref Range    Phosphorus 2.7 2.5 - 4.5 mg/dL   Magnesium   Result Value Ref Range    Magnesium 1.8 1.6 - 2.6 mg/dL   EKG 12 lead   Result Value Ref Range    Ventricular Rate 70 BPM    Atrial Rate 70 BPM    P-R Interval 166 ms    QRS Duration 94 ms    Q-T Interval 414 ms    QTc Calculation (Bazett) 447 ms    P Axis 22 degrees    R Axis 115 degrees    T Axis 6 degrees       RADIOLOGY:  CT HEAD WO CONTRAST    Result Date: 7/7/2021  EXAMINATION: CT OF THE HEAD WITHOUT CONTRAST  7/7/2021 12:29 am TECHNIQUE: CT of the head was performed without the administration of intravenous contrast. Dose modulation, iterative reconstruction, and/or weight based adjustment of the mA/kV was utilized to reduce the radiation dose to as low as reasonably achievable. COMPARISON: 04/01/2020 HISTORY: ORDERING SYSTEM PROVIDED HISTORY: altered mental status TECHNOLOGIST PROVIDED HISTORY: Has a \"code stroke\" or \"stroke alert\" been called? ->No Reason for exam:->altered mental status Decision Support Exception - unselect if not a suspected or confirmed emergency medical condition->Emergency Medical Condition (MA) FINDINGS: No intracranial hemorrhage, mass effect or midline shift. Basal cisterns are patent. Ventricles are not enlarged. Cortical volume loss. Areas of decreased attenuation in the bilateral white matter are nonspecific but likely due to chronic microvascular ischemia. Vascular calcifications. Partial opacification of bilateral mastoid air cells, similar to previous. Hyperostosis of the right maxillary sinus is unchanged consistent with chronic sinusitis. The osseous structures appear diffusely somewhat heterogeneous and osteopenic. This is similar to previous and favored to be incidental.  Developing marrow replacing process thought to be less likely. No acute intracranial findings or significant change identified. Consider MRI if symptoms persist. Volume loss and findings suggestive of mild chronic microvascular ischemia. XR CHEST 1 VIEW    Result Date: 7/6/2021  EXAMINATION: ONE XRAY VIEW OF THE CHEST 7/6/2021 10:41 pm COMPARISON: 07/22/2019 HISTORY: ORDERING SYSTEM PROVIDED HISTORY: sob TECHNOLOGIST PROVIDED HISTORY: Reason for exam:->sob FINDINGS: Heart size is normal.  Scattered vascular calcifications. Coarsened interstitial opacities are likely chronic. No pneumothorax. Apparent scarring in the lung bases appear similar to previous. Degenerative changes are present in the spine and shoulders. Mild S-shaped scoliotic curvature of the thoracolumbar spine. Similar chronic appearing findings. PA and lateral views would be useful for further assessment, if symptoms persist.     ------------------------- NURSING NOTES AND VITALS REVIEWED ---------------------------  Date / Time Roomed:  7/6/2021 10:58 PM  ED Bed Assignment:  [id number]    The nursing notes within the ED encounter and vital signs as below have been reviewed. No data found.     Oxygen Saturation Interpretation: Normal    ------------------------------------------ PROGRESS NOTES ------------------------------------------  Counseling:  I have spoken with the patient and discussed todays results, in addition to providing specific details for the plan of care and counseling regarding the diagnosis and prognosis. Their questions are answered at this time and they are agreeable with the plan of admission.    --------------------------------- ADDITIONAL PROVIDER NOTES ---------------------------------  This patient's ED course included: a personal history and physicial examination, re-evaluation prior to disposition, multiple bedside re-evaluations, IV medications, cardiac monitoring and continuous pulse oximetry    This patient has remained hemodynamically stable during their ED course. Diagnosis:  1. Hyponatremia New Problem   2. Altered mental status, unspecified altered mental status type Improving       Disposition:  Patient's disposition: Admit to telemetry  Patient's condition is fair.          Erik Palacios DO  07/28/21 1605

## 2021-08-16 ENCOUNTER — OFFICE VISIT (OUTPATIENT)
Dept: FAMILY MEDICINE CLINIC | Age: 86
End: 2021-08-16
Payer: MEDICARE

## 2021-08-16 VITALS
BODY MASS INDEX: 21.9 KG/M2 | WEIGHT: 119 LBS | RESPIRATION RATE: 18 BRPM | SYSTOLIC BLOOD PRESSURE: 120 MMHG | DIASTOLIC BLOOD PRESSURE: 70 MMHG | TEMPERATURE: 96.3 F | OXYGEN SATURATION: 99 % | HEART RATE: 65 BPM | HEIGHT: 62 IN

## 2021-08-16 DIAGNOSIS — I10 ESSENTIAL HYPERTENSION: Primary | ICD-10-CM

## 2021-08-16 DIAGNOSIS — E78.2 MIXED HYPERLIPIDEMIA: ICD-10-CM

## 2021-08-16 DIAGNOSIS — F01.50 VASCULAR DEMENTIA WITHOUT BEHAVIORAL DISTURBANCE (HCC): ICD-10-CM

## 2021-08-16 DIAGNOSIS — M15.9 PRIMARY OSTEOARTHRITIS INVOLVING MULTIPLE JOINTS: ICD-10-CM

## 2021-08-16 PROCEDURE — 99214 OFFICE O/P EST MOD 30 MIN: CPT | Performed by: FAMILY MEDICINE

## 2021-08-16 ASSESSMENT — ENCOUNTER SYMPTOMS
COUGH: 0
ROS SKIN COMMENTS: ABRASION RIGHT ELBOW
TROUBLE SWALLOWING: 0
ABDOMINAL PAIN: 0
DIARRHEA: 0
NAUSEA: 0
SINUS PAIN: 0
VOMITING: 0
ALLERGIC/IMMUNOLOGIC NEGATIVE: 1
SHORTNESS OF BREATH: 0
PHOTOPHOBIA: 0
CHEST TIGHTNESS: 0
EYE REDNESS: 0
EYE DISCHARGE: 0
EYE PAIN: 0
SORE THROAT: 0
BLOOD IN STOOL: 0
BACK PAIN: 0

## 2021-08-16 NOTE — PROGRESS NOTES
21  Sylvie Cerda : 3/13/1932 Sex: female  Age: 80 y.o. Assessment and Plan:  [place] was seen today for hypertension. Diagnoses and all orders for this visit:    Essential hypertension  Pressure well controlled with current medications. Vascular dementia without behavioral disturbance ([address]  She maintained status quo. Does not wander, does get confused at times. May be an increasing fall risk,   will monitor closely. Primary osteoarthritis involving multiple joints  Moving around well, she complains of minimal pain. Mixed hyperlipidemia  -     Lipid Panel; Future  -     T4, Free; Future  -     TSH without Reflex; Future  Time for recheck of fasting blood work which will include lipids and thyroid function. Review of blood work ordered by renal included a comprehensive and CBC which were essentially within normal limits. No follow-ups on file. Chief Complaint   Patient presents with    Hypertension       Patient returns for recheck. Reviewed laboratory work by nephrology. Kidney functions essentially within normal limits CBC shows mild anemia but otherwise unchanged from previous. He still needs lipids and thyroid function act and will order this today. She is still getting around without any difficulty, walks every day. Had a fall since last visit abrasion of her right elbow. Review of Systems   Constitutional: Negative for appetite change, fatigue and unexpected weight change. HENT: Negative for congestion, ear pain, hearing loss, sinus pain, sore throat and trouble swallowing. Eyes: Negative for photophobia, pain, discharge and redness. Respiratory: Negative for cough, chest tightness and shortness of breath. Cardiovascular: Negative for chest pain, palpitations and leg swelling. Gastrointestinal: Negative for abdominal pain, blood in stool, diarrhea, nausea and vomiting. Endocrine: Negative.     Genitourinary: Negative for dysuria, flank pain, frequency and hematuria. Musculoskeletal: Negative for arthralgias, back pain, joint swelling and myalgias. Skin: Positive for wound. Abrasion right elbow   Allergic/Immunologic: Negative. Neurological: Negative for dizziness, seizures, syncope, weakness, light-headedness, numbness and headaches. Hematological: Negative for adenopathy. Does not bruise/bleed easily. Psychiatric/Behavioral: Negative. Current Outpatient Medications:     lisinopril (PRINIVIL;ZESTRIL) 20 MG tablet, Take 1 tablet by mouth daily, Disp: 90 tablet, Rfl: 1    hydrOXYzine (ATARAX) 25 MG tablet, 1 or 2 tablets nightly (Patient taking differently: Take 50 mg by mouth nightly ), Disp: 180 tablet, Rfl: 1    memantine (NAMENDA) 10 MG tablet, Take 1 tablet by mouth 2 times daily, Disp: 180 tablet, Rfl: 1    vitamin D (CHOLECALCIFEROL) 1000 UNIT TABS tablet, Take 1,000 Units by mouth daily , Disp: , Rfl:     aspirin 81 MG tablet, Take by mouth, Disp: , Rfl:     Vitamin E 100 UNITS TABS, Take 1 capsule by mouth daily. , Disp: , Rfl:     Multiple Vitamins-Minerals (THERAPEUTIC MULTIVITAMIN-MINERALS) tablet, Take 1 tablet by mouth daily. , Disp: , Rfl:   Allergies   Allergen Reactions    Biaxin [Clarithromycin] Other (See Comments)     Many years ago, does not recall reaction    Pcn [Penicillins] Rash       Past Medical History:   Diagnosis Date    Dementia ([facility] [address]     Epiretinal membrane     Hard of hearing     Hyperlipidemia     Hypertension     Macular degeneration     Osteoarthritis     Vitamin D deficiency      Past Surgical History:   Procedure Laterality Date    CATARACT REMOVAL WITH IMPLANT Bilateral     COLONOSCOPY  08/09/2010    COLPOSCOPY      DILATION AND CURETTAGE OF UTERUS      HEMORRHOID SURGERY  years ago    REPAIR RETINAL TEAR/HOLE Left     TONSILLECTOMY AND ADENOIDECTOMY      VITRECTOMY Left 03/11/2015     Family History   Problem Relation Age of Onset    Colon Cancer Mother    Trina Dahl Hypertension Mother     Lung Cancer Father         Black Lung    Breast Cancer Sister     Lung Cancer Brother     Other Other         6 siblings, most  of infirmities of old age   Claude Cedric No Known Problems Sister     No Known Problems Sister     No Known Problems Brother     No Known Problems Brother      Social History     Socioeconomic History    Marital status:      Spouse name: Not on file    Number of children: Not on file    Years of education: Not on file    Highest education level: Not on file   Occupational History    Occupation: retired bakery   Tobacco Use    Smoking status: Never Smoker    Smokeless tobacco: Never Used   Substance and Sexual Activity    Alcohol use: No    Drug use: No    Sexual activity: Not on file   Other Topics Concern    Not on file   Social History Narrative    Not on file     Social Determinants of Health     Financial Resource Strain: Low Risk     Difficulty of Paying Living Expenses: Not hard at all   Food Insecurity: Unknown    Worried About [address] in the Last Year: Patient refused    [address] [facility] [address] in the Last Year: Patient refused   Transportation Needs:     Lack of Transportation (Medical):      Lack of Transportation (Non-Medical):    Physical Activity:     Days of Exercise per Week:     Minutes of Exercise per Session:    Stress:     Feeling of Stress :    Social Connections:     Frequency of Communication with Friends and Family:     Frequency of Social Gatherings with Friends and Family:     Attends Sikhism Services:     Active Member of Clubs or Organizations:     Attends Club or Organization Meetings:     Marital Status:    Intimate Partner Violence:     Fear of Current or Ex-Partner:     Emotionally Abused:     Physically Abused:     Sexually Abused:        Vitals:    21 0803   BP: 120/70   Pulse: 65   Resp: 18   Temp: 96.3 °F (35.7 °C)   TempSrc: Temporal   SpO2: 99%   Weight: 119 lb (54 kg)   Height: 5' 2\" (1.575 m)       Physical Exam  Vitals and nursing note reviewed. Constitutional:       Appearance: She is well-developed. HENT:      Head: Atraumatic. Right Ear: External ear normal.      Left Ear: External ear normal.      Nose: Nose normal.      Mouth/Throat:      Pharynx: No oropharyngeal exudate. Eyes:      Conjunctiva/sclera: Conjunctivae normal.      Pupils: Pupils are equal, round, and reactive to light. Neck:      Thyroid: No thyromegaly. Trachea: No tracheal deviation. Cardiovascular:      Rate and Rhythm: Normal rate and regular rhythm. Heart sounds: No murmur heard. No friction rub. No gallop. Pulmonary:      Effort: Pulmonary effort is normal. No respiratory distress. Breath sounds: Normal breath sounds. Abdominal:      General: Bowel sounds are normal.      Palpations: Abdomen is soft. Musculoskeletal:         General: No tenderness or deformity. Normal range of motion. Cervical back: Normal range of motion and neck supple. Lymphadenopathy:      Cervical: No cervical adenopathy. Skin:     General: Skin is warm and dry. Capillary Refill: Capillary refill takes less than 2 seconds. Findings: No rash. Comments: Abrasion right elbow, small superficial skin tear left elbow. Neurological:      Mental Status: She is alert and oriented to person, place, and time. Sensory: No sensory deficit. Motor: No abnormal muscle tone.       Coordination: Coordination normal.      Deep Tendon Reflexes: Reflexes normal.             Seen By:  Grace Lopez DO

## 2021-11-10 DIAGNOSIS — F01.50 VASCULAR DEMENTIA WITHOUT BEHAVIORAL DISTURBANCE (HCC): ICD-10-CM

## 2021-11-10 RX ORDER — MEMANTINE HYDROCHLORIDE 10 MG/1
TABLET ORAL
Qty: 180 TABLET | Refills: 1 | Status: SHIPPED
Start: 2021-11-10 | End: 2021-11-15 | Stop reason: SDUPTHER

## 2021-11-15 ENCOUNTER — OFFICE VISIT (OUTPATIENT)
Dept: FAMILY MEDICINE CLINIC | Age: 86
End: 2021-11-15
Payer: MEDICARE

## 2021-11-15 VITALS
HEART RATE: 76 BPM | BODY MASS INDEX: 21.77 KG/M2 | WEIGHT: 119 LBS | SYSTOLIC BLOOD PRESSURE: 138 MMHG | DIASTOLIC BLOOD PRESSURE: 82 MMHG | OXYGEN SATURATION: 97 % | TEMPERATURE: 97.6 F

## 2021-11-15 DIAGNOSIS — Z23 NEED FOR VACCINATION: Primary | ICD-10-CM

## 2021-11-15 DIAGNOSIS — F06.4 ANXIETY DISORDER DUE TO GENERAL MEDICAL CONDITION: ICD-10-CM

## 2021-11-15 DIAGNOSIS — I10 ESSENTIAL HYPERTENSION: ICD-10-CM

## 2021-11-15 DIAGNOSIS — M19.011 PRIMARY OSTEOARTHRITIS OF RIGHT SHOULDER: ICD-10-CM

## 2021-11-15 DIAGNOSIS — F01.50 VASCULAR DEMENTIA WITHOUT BEHAVIORAL DISTURBANCE (HCC): ICD-10-CM

## 2021-11-15 PROCEDURE — 99214 OFFICE O/P EST MOD 30 MIN: CPT | Performed by: FAMILY MEDICINE

## 2021-11-15 PROCEDURE — G0008 ADMIN INFLUENZA VIRUS VAC: HCPCS | Performed by: FAMILY MEDICINE

## 2021-11-15 PROCEDURE — 90694 VACC AIIV4 NO PRSRV 0.5ML IM: CPT | Performed by: FAMILY MEDICINE

## 2021-11-15 RX ORDER — MEMANTINE HYDROCHLORIDE 10 MG/1
10 TABLET ORAL DAILY
Qty: 90 TABLET | Refills: 1 | Status: SHIPPED
Start: 2021-11-15 | End: 2022-05-11 | Stop reason: SDUPTHER

## 2021-11-15 RX ORDER — LISINOPRIL 20 MG/1
20 TABLET ORAL DAILY
Qty: 90 TABLET | Refills: 1 | Status: SHIPPED
Start: 2021-11-15 | End: 2022-05-11 | Stop reason: SDUPTHER

## 2021-11-15 ASSESSMENT — ENCOUNTER SYMPTOMS
SHORTNESS OF BREATH: 0
SINUS PAIN: 0
PHOTOPHOBIA: 0
TROUBLE SWALLOWING: 0
EYE REDNESS: 0
EYE PAIN: 0
SORE THROAT: 0
EYE DISCHARGE: 0
ALLERGIC/IMMUNOLOGIC NEGATIVE: 1
DIARRHEA: 0
BLOOD IN STOOL: 0
BACK PAIN: 0
CHEST TIGHTNESS: 0
ABDOMINAL PAIN: 0
COUGH: 0
NAUSEA: 0
VOMITING: 0

## 2021-11-15 NOTE — PROGRESS NOTES
11/15/21  Kacie Greene : 3/13/1932 Sex: female  Age: 80 y.o. Assessment and Plan:  [place] was seen today for hypertension and shoulder pain. Diagnoses and all orders for this visit:    Need for vaccination  -     INFLUENZA, QUADV, ADJUVANTED, 72 YRS =, IM, PF, PREFILL SYR, 0.5ML (FLUAD)    Anxiety disorder due to general medical condition    Essential hypertension  -     lisinopril (PRINIVIL;ZESTRIL) 20 MG tablet; Take 1 tablet by mouth daily    Vascular dementia without behavioral disturbance (HCC)  -     memantine (NAMENDA) 10 MG tablet; Take 1 tablet by mouth daily    Primary osteoarthritis of right shoulder  -     XR SHOULDER RIGHT (MIN 2 VIEWS); Future  -     Janae - Blake Mercedes MD, Orthopaedics, [facility]  Or with degenerative joint disease, effusion. Will need orthopedic referral for joint injection. Return in about 3 months (around 2/15/2022). Chief Complaint   Patient presents with    Hypertension    Shoulder Pain     right shoulder pain for about 6 months, has progressively gotten worse - pt states it has started to swell       The patient is here for blood pressure check. Patient has been taking their medications as prescribed. No recent changes to their medications. No headache or visual disturbances. No dizziness or tinnitus. No chest pain, palpitations, or dyspnea. No nausea and vomiting. No numbness, tingling and or weakness to the extremities. No fevers or chills. No recent illness. Complaining of swelling and pain in her right shoulder. Is been swelling over the last couple of months. And her range of motion is diminished somewhat. She denies any redness or bruising of the joint. There is good color, pulses, sensation of the right arm. She denied any trauma or overuse to the area as well. Review of Systems   Constitutional: Negative for appetite change, fatigue and unexpected weight change.    HENT: Negative for congestion, ear pain, hearing loss, sinus pain, sore throat and trouble swallowing. Eyes: Negative for photophobia, pain, discharge and redness. Respiratory: Negative for cough, chest tightness and shortness of breath. Cardiovascular: Negative for chest pain, palpitations and leg swelling. Gastrointestinal: Negative for abdominal pain, blood in stool, diarrhea, nausea and vomiting. Endocrine: Negative. Genitourinary: Negative for dysuria, flank pain, frequency and hematuria. Musculoskeletal: Positive for arthralgias, joint swelling and myalgias. Negative for back pain. Right shoulder   Skin: Negative. Allergic/Immunologic: Negative. Neurological: Negative for dizziness, seizures, syncope, weakness, light-headedness, numbness and headaches. Hematological: Negative for adenopathy. Does not bruise/bleed easily. Psychiatric/Behavioral: Negative. Current Outpatient Medications:     lisinopril (PRINIVIL;ZESTRIL) 20 MG tablet, Take 1 tablet by mouth daily, Disp: 90 tablet, Rfl: 1    memantine (NAMENDA) 10 MG tablet, Take 1 tablet by mouth daily, Disp: 90 tablet, Rfl: 1    hydrOXYzine (ATARAX) 25 MG tablet, 1 or 2 tablets nightly (Patient taking differently: Take 50 mg by mouth nightly ), Disp: 180 tablet, Rfl: 1    vitamin D (CHOLECALCIFEROL) 1000 UNIT TABS tablet, Take 1,000 Units by mouth daily , Disp: , Rfl:     aspirin 81 MG tablet, Take by mouth, Disp: , Rfl:     Vitamin E 100 UNITS TABS, Take 1 capsule by mouth daily. , Disp: , Rfl:     Multiple Vitamins-Minerals (THERAPEUTIC MULTIVITAMIN-MINERALS) tablet, Take 1 tablet by mouth daily. , Disp: , Rfl:   Allergies   Allergen Reactions    Biaxin [Clarithromycin] Other (See Comments)     Many years ago, does not recall reaction    Pcn [Penicillins] Rash       Past Medical History:   Diagnosis Date    Dementia ([facility] [address]     Epiretinal membrane     Hard of hearing     Hyperlipidemia     Hypertension     Macular degeneration     Osteoarthritis     Vitamin D deficiency      Past Surgical History:   Procedure Laterality Date    CATARACT REMOVAL WITH IMPLANT Bilateral     COLONOSCOPY  2010    COLPOSCOPY      DILATION AND CURETTAGE OF UTERUS      HEMORRHOID SURGERY  years ago    REPAIR RETINAL TEAR/HOLE Left     TONSILLECTOMY AND ADENOIDECTOMY      VITRECTOMY Left 2015     Family History   Problem Relation Age of Onset    Colon Cancer Mother     Hypertension Mother     Lung Cancer Father         Black Lung    Breast Cancer Sister     Lung Cancer Brother     Other Other         6 siblings, most  of infirmities of old age   Rebbecca Hinders No Known Problems Sister     No Known Problems Sister     No Known Problems Brother     No Known Problems Brother      Social History     Socioeconomic History    Marital status:      Spouse name: Not on file    Number of children: Not on file    Years of education: Not on file    Highest education level: Not on file   Occupational History    Occupation: retired bakery   Tobacco Use    Smoking status: Never Smoker    Smokeless tobacco: Never Used   Substance and Sexual Activity    Alcohol use: No    Drug use: No    Sexual activity: Not on file   Other Topics Concern    Not on file   Social History Narrative    Not on file     Social Determinants of Health     Financial Resource Strain: Low Risk     Difficulty of Paying Living Expenses: Not hard at all   Food Insecurity: Unknown    Worried About [facility] in the Last Year: Patient refused    [address] [facility] [address] in the Last Year: Patient refused   Transportation Needs:     Lack of Transportation (Medical): Not on file    Lack of Transportation (Non-Medical):  Not on file   Physical Activity:     Days of Exercise per Week: Not on file    Minutes of Exercise per Session: Not on file   Stress:     Feeling of Stress : Not on file   Social Connections:     Frequency of Communication with Friends and Family: Not on file    Frequency of Social Gatherings with Friends and Family: Not on file    Attends [employer] Services: Not on file    Active Member of Clubs or Organizations: Not on file    Attends Club or Organization Meetings: Not on file    Marital Status: Not on file   Intimate Partner Violence:     Fear of Current or Ex-Partner: Not on file    Emotionally Abused: Not on file    Physically Abused: Not on file    Sexually Abused: Not on file   Housing Stability:     Unable to Pay for Housing in the Last Year: Not on file    Number of [place] in the Last Year: Not on file    Unstable Housing in the Last Year: Not on file       Vitals:    11/15/21 0811   BP: 138/82   Pulse: 76   Temp: 97.6 °F (36.4 °C)   SpO2: 97%   Weight: 119 lb (54 kg)       Physical Exam  Vitals and nursing note reviewed. Constitutional:       Appearance: She is well-developed. HENT:      Head: Atraumatic. Right Ear: External ear normal.      Left Ear: External ear normal.      Nose: Nose normal.      Mouth/Throat:      Pharynx: No oropharyngeal exudate. Eyes:      Conjunctiva/sclera: Conjunctivae normal.      Pupils: Pupils are equal, round, and reactive to light. Neck:      Thyroid: No thyromegaly. Trachea: No tracheal deviation. Cardiovascular:      Rate and Rhythm: Normal rate and regular rhythm. Heart sounds: No murmur heard. No friction rub. No gallop. Pulmonary:      Effort: Pulmonary effort is normal. No respiratory distress. Breath sounds: Normal breath sounds. Abdominal:      General: Bowel sounds are normal.      Palpations: Abdomen is soft. Musculoskeletal:         General: No tenderness or deformity. Normal range of motion. Cervical back: Normal range of motion and neck supple. Comments: Pain, effusion, stiffness, decreased range of motion, right shoulder. There is good color, pulses, sensation, strength of the right arm. Lymphadenopathy:      Cervical: No cervical adenopathy.    Skin:     General: Skin is warm and dry. Capillary Refill: Capillary refill takes less than 2 seconds. Findings: No rash. Neurological:      Mental Status: She is alert and oriented to person, place, and time. Sensory: No sensory deficit. Motor: No abnormal muscle tone.       Coordination: Coordination normal.      Deep Tendon Reflexes: Reflexes normal.             Seen By:  Malgorzata Kat DO

## 2021-11-23 ENCOUNTER — OFFICE VISIT (OUTPATIENT)
Dept: ORTHOPEDIC SURGERY | Age: 86
End: 2021-11-23
Payer: MEDICARE

## 2021-11-23 VITALS — HEIGHT: 60 IN | WEIGHT: 119 LBS | BODY MASS INDEX: 23.36 KG/M2

## 2021-11-23 DIAGNOSIS — M19.011 PRIMARY OSTEOARTHRITIS OF RIGHT SHOULDER: Primary | ICD-10-CM

## 2021-11-23 PROCEDURE — 99203 OFFICE O/P NEW LOW 30 MIN: CPT | Performed by: ORTHOPAEDIC SURGERY

## 2021-11-23 NOTE — PROGRESS NOTES
New Shoulder Patient Visit     Referring Provider:   DO Nadege Red [address],  [address]:   Chief Complaint   Patient presents with    Shoulder Pain     about 3 weeks ago pt experienced swelling no pain in the shoulder has good ROM         HPI:      Sejal Lyons is a 80y.o. year old female who is seen today  for evaluation of right shoulder swelling. She does not recall a specific event which preceded the swelling. Patient and son report new onset of swelling about 3 to 4 weeks ago. She does report she had been raking leaves. She reports minimal pain. The patient does not have mechanical symptoms. She does not have night pain. She denies a feeling of instability. The prior treatments have been none. The patient is right hand dominant. The patient is not working.      PAST MEDICAL HISTORY  Past Medical History:   Diagnosis Date    Dementia ([address]     Epiretinal membrane     Hard of hearing     Hyperlipidemia     Hypertension     Macular degeneration     Osteoarthritis     Vitamin D deficiency        PAST SURGICAL HISTORY  Past Surgical History:   Procedure Laterality Date    CATARACT REMOVAL WITH IMPLANT Bilateral     COLONOSCOPY  2010    COLPOSCOPY      DILATION AND CURETTAGE OF UTERUS      HEMORRHOID SURGERY  years ago    REPAIR RETINAL TEAR/HOLE Left     TONSILLECTOMY AND ADENOIDECTOMY      VITRECTOMY Left 2015       FAMILY HISTORY   Family History   Problem Relation Age of Onset    Colon Cancer Mother     Hypertension Mother     Lung Cancer Father         Black Lung    Breast Cancer Sister     Lung Cancer Brother     Other Other         6 siblings, most  of infirmities of old age   [facility] No Known Problems Sister     No Known Problems Sister     No Known Problems Brother     No Known Problems Brother        SOCIAL HISTORY  Social History     Occupational History    Occupation: retired bakery   Tobacco Use    Smoking status: Never Smoker    Smokeless tobacco: Never Used   Substance and Sexual Activity    Alcohol use: No    Drug use: No    Sexual activity: Not on file       CURRENT MEDICATIONS     Current Outpatient Medications:     lisinopril (PRINIVIL;ZESTRIL) 20 MG tablet, Take 1 tablet by mouth daily, Disp: 90 tablet, Rfl: 1    memantine (NAMENDA) 10 MG tablet, Take 1 tablet by mouth daily, Disp: 90 tablet, Rfl: 1    hydrOXYzine (ATARAX) 25 MG tablet, 1 or 2 tablets nightly (Patient taking differently: Take 50 mg by mouth nightly ), Disp: 180 tablet, Rfl: 1    vitamin D (CHOLECALCIFEROL) 1000 UNIT TABS tablet, Take 1,000 Units by mouth daily , Disp: , Rfl:     aspirin 81 MG tablet, Take by mouth, Disp: , Rfl:     Vitamin E 100 UNITS TABS, Take 1 capsule by mouth daily. , Disp: , Rfl:     Multiple Vitamins-Minerals (THERAPEUTIC MULTIVITAMIN-MINERALS) tablet, Take 1 tablet by mouth daily. , Disp: , Rfl:     ALLERGIES  Allergies   Allergen Reactions    Biaxin [Clarithromycin] Other (See Comments)     Many years ago, does not recall reaction    Pcn [Penicillins] Rash       Controlled Substances Monitoring:        REVIEW OF SYSTEMS:     Constitutional:  Negative for weight loss, fevers, chills, fatigue  Cardiovascular: Negative for chest pain, palpitations  Pulmonary: Negative for shortness of breath, labored breathing, cough  GI: negative for abdominal pain, nausea, vomitting   MSK: per HPI  Skin: negative for rash, open wounds    All other systems reviewed and are negative           PHYSICAL EXAM     Vitals:    11/23/21 0836   Weight: 119 lb (54 kg)   Height: 5' (1.524 m)       Height: 5' (1.524 m)  Weight: [unfilled]  BMI:  Body mass index is 23.24 kg/m². General: The patient is alert and oriented x 3, appears to be stated age and in no distress. HEENT: head is normocephalic, atraumatic. EOMI. Neck: supple, trachea midline, no thyromegaly   Cardiovascular: peripheral pulses palpable.   Normal

## 2021-12-06 DIAGNOSIS — F06.4 ANXIETY DISORDER DUE TO GENERAL MEDICAL CONDITION: ICD-10-CM

## 2021-12-06 RX ORDER — HYDROXYZINE HYDROCHLORIDE 25 MG/1
TABLET, FILM COATED ORAL
Qty: 180 TABLET | Refills: 1 | Status: SHIPPED
Start: 2021-12-06 | End: 2022-05-11 | Stop reason: SDUPTHER

## 2021-12-06 NOTE — TELEPHONE ENCOUNTER
Last Appointment:  11/15/2021  Future Appointments   Date Time Provider Jonatan Lopez   2/14/2022  7:45 AM [place] Jiles Cowden,  [address] [place] [address]

## 2022-02-14 ENCOUNTER — OFFICE VISIT (OUTPATIENT)
Dept: FAMILY MEDICINE CLINIC | Age: 87
End: 2022-02-14
Payer: MEDICARE

## 2022-02-14 VITALS
TEMPERATURE: 97.7 F | RESPIRATION RATE: 16 BRPM | DIASTOLIC BLOOD PRESSURE: 70 MMHG | WEIGHT: 124 LBS | HEART RATE: 65 BPM | SYSTOLIC BLOOD PRESSURE: 132 MMHG | BODY MASS INDEX: 24.35 KG/M2 | OXYGEN SATURATION: 99 % | HEIGHT: 60 IN

## 2022-02-14 DIAGNOSIS — I10 ESSENTIAL HYPERTENSION: Primary | ICD-10-CM

## 2022-02-14 DIAGNOSIS — E78.2 MIXED HYPERLIPIDEMIA: ICD-10-CM

## 2022-02-14 DIAGNOSIS — I10 ESSENTIAL HYPERTENSION: ICD-10-CM

## 2022-02-14 DIAGNOSIS — M15.9 PRIMARY OSTEOARTHRITIS INVOLVING MULTIPLE JOINTS: ICD-10-CM

## 2022-02-14 DIAGNOSIS — F01.50 VASCULAR DEMENTIA WITHOUT BEHAVIORAL DISTURBANCE (HCC): ICD-10-CM

## 2022-02-14 DIAGNOSIS — E55.9 VITAMIN D DEFICIENCY: ICD-10-CM

## 2022-02-14 LAB
ALBUMIN SERPL-MCNC: 4.3 G/DL (ref 3.5–5.2)
ALP BLD-CCNC: 117 U/L (ref 35–104)
ALT SERPL-CCNC: 18 U/L (ref 0–32)
ANION GAP SERPL CALCULATED.3IONS-SCNC: 12 MMOL/L (ref 7–16)
AST SERPL-CCNC: 29 U/L (ref 0–31)
BASOPHILS ABSOLUTE: 0.03 E9/L (ref 0–0.2)
BASOPHILS RELATIVE PERCENT: 0.7 % (ref 0–2)
BILIRUB SERPL-MCNC: 0.6 MG/DL (ref 0–1.2)
BILIRUBIN URINE: NEGATIVE
BLOOD, URINE: NEGATIVE
BUN BLDV-MCNC: 19 MG/DL (ref 6–23)
CALCIUM SERPL-MCNC: 9.5 MG/DL (ref 8.6–10.2)
CHLORIDE BLD-SCNC: 100 MMOL/L (ref 98–107)
CHOLESTEROL, TOTAL: 240 MG/DL (ref 0–199)
CLARITY: CLEAR
CO2: 26 MMOL/L (ref 22–29)
COLOR: YELLOW
CREAT SERPL-MCNC: 1 MG/DL (ref 0.5–1)
EOSINOPHILS ABSOLUTE: 0.23 E9/L (ref 0.05–0.5)
EOSINOPHILS RELATIVE PERCENT: 5.7 % (ref 0–6)
GFR AFRICAN AMERICAN: >60
GFR NON-AFRICAN AMERICAN: 52 ML/MIN/1.73
GLUCOSE FASTING: 76 MG/DL (ref 74–99)
GLUCOSE URINE: NEGATIVE MG/DL
HCT VFR BLD CALC: 41.4 % (ref 34–48)
HDLC SERPL-MCNC: 95 MG/DL
HEMOGLOBIN: 13.2 G/DL (ref 11.5–15.5)
IMMATURE GRANULOCYTES #: 0.01 E9/L
IMMATURE GRANULOCYTES %: 0.2 % (ref 0–5)
KETONES, URINE: NEGATIVE MG/DL
LDL CHOLESTEROL CALCULATED: 136 MG/DL (ref 0–99)
LEUKOCYTE ESTERASE, URINE: NEGATIVE
LYMPHOCYTES ABSOLUTE: 0.94 E9/L (ref 1.5–4)
LYMPHOCYTES RELATIVE PERCENT: 23.4 % (ref 20–42)
MCH RBC QN AUTO: 29.1 PG (ref 26–35)
MCHC RBC AUTO-ENTMCNC: 31.9 % (ref 32–34.5)
MCV RBC AUTO: 91.2 FL (ref 80–99.9)
MONOCYTES ABSOLUTE: 0.48 E9/L (ref 0.1–0.95)
MONOCYTES RELATIVE PERCENT: 12 % (ref 2–12)
NEUTROPHILS ABSOLUTE: 2.32 E9/L (ref 1.8–7.3)
NEUTROPHILS RELATIVE PERCENT: 58 % (ref 43–80)
NITRITE, URINE: NEGATIVE
PDW BLD-RTO: 14.6 FL (ref 11.5–15)
PH UA: 5.5 (ref 5–9)
PLATELET # BLD: 177 E9/L (ref 130–450)
PMV BLD AUTO: 9.6 FL (ref 7–12)
POTASSIUM SERPL-SCNC: 4.7 MMOL/L (ref 3.5–5)
PROTEIN UA: NEGATIVE MG/DL
RBC # BLD: 4.54 E12/L (ref 3.5–5.5)
SODIUM BLD-SCNC: 138 MMOL/L (ref 132–146)
SPECIFIC GRAVITY UA: 1.02 (ref 1–1.03)
TOTAL PROTEIN: 6.8 G/DL (ref 6.4–8.3)
TRIGL SERPL-MCNC: 47 MG/DL (ref 0–149)
TSH SERPL DL<=0.05 MIU/L-ACNC: 1.36 UIU/ML (ref 0.27–4.2)
UROBILINOGEN, URINE: 0.2 E.U./DL
VITAMIN D 25-HYDROXY: 39 NG/ML (ref 30–100)
VLDLC SERPL CALC-MCNC: 9 MG/DL
WBC # BLD: 4 E9/L (ref 4.5–11.5)

## 2022-02-14 PROCEDURE — 99214 OFFICE O/P EST MOD 30 MIN: CPT | Performed by: FAMILY MEDICINE

## 2022-02-14 ASSESSMENT — ENCOUNTER SYMPTOMS
SINUS PAIN: 0
TROUBLE SWALLOWING: 0
DIARRHEA: 0
BACK PAIN: 0
BLOOD IN STOOL: 0
COUGH: 0
VOMITING: 0
SORE THROAT: 0
ABDOMINAL PAIN: 0
PHOTOPHOBIA: 0
EYE PAIN: 0
ALLERGIC/IMMUNOLOGIC NEGATIVE: 1
EYE REDNESS: 0
NAUSEA: 0
CHEST TIGHTNESS: 0
EYE DISCHARGE: 0
SHORTNESS OF BREATH: 0

## 2022-05-11 ENCOUNTER — OFFICE VISIT (OUTPATIENT)
Dept: ORTHOPEDIC SURGERY | Age: 87
End: 2022-05-11
Payer: MEDICARE

## 2022-05-11 ENCOUNTER — OFFICE VISIT (OUTPATIENT)
Dept: FAMILY MEDICINE CLINIC | Age: 87
End: 2022-05-11
Payer: MEDICARE

## 2022-05-11 VITALS — WEIGHT: 120 LBS | HEIGHT: 60 IN | BODY MASS INDEX: 23.56 KG/M2

## 2022-05-11 VITALS
SYSTOLIC BLOOD PRESSURE: 126 MMHG | BODY MASS INDEX: 23.56 KG/M2 | HEIGHT: 60 IN | RESPIRATION RATE: 16 BRPM | HEART RATE: 76 BPM | DIASTOLIC BLOOD PRESSURE: 76 MMHG | WEIGHT: 120 LBS | TEMPERATURE: 97.8 F | OXYGEN SATURATION: 98 %

## 2022-05-11 VITALS
TEMPERATURE: 97.8 F | HEART RATE: 76 BPM | OXYGEN SATURATION: 98 % | HEIGHT: 60 IN | SYSTOLIC BLOOD PRESSURE: 126 MMHG | WEIGHT: 120 LBS | DIASTOLIC BLOOD PRESSURE: 76 MMHG | RESPIRATION RATE: 16 BRPM | BODY MASS INDEX: 23.56 KG/M2

## 2022-05-11 DIAGNOSIS — F06.4 ANXIETY DISORDER DUE TO GENERAL MEDICAL CONDITION: ICD-10-CM

## 2022-05-11 DIAGNOSIS — M19.011 PRIMARY OSTEOARTHRITIS OF RIGHT SHOULDER: Primary | ICD-10-CM

## 2022-05-11 DIAGNOSIS — M25.411 EFFUSION OF RIGHT SHOULDER: ICD-10-CM

## 2022-05-11 DIAGNOSIS — F01.50 VASCULAR DEMENTIA WITHOUT BEHAVIORAL DISTURBANCE (HCC): ICD-10-CM

## 2022-05-11 DIAGNOSIS — Z00.00 MEDICARE ANNUAL WELLNESS VISIT, SUBSEQUENT: Primary | ICD-10-CM

## 2022-05-11 DIAGNOSIS — I10 ESSENTIAL HYPERTENSION: ICD-10-CM

## 2022-05-11 PROCEDURE — G0439 PPPS, SUBSEQ VISIT: HCPCS | Performed by: FAMILY MEDICINE

## 2022-05-11 PROCEDURE — 20610 DRAIN/INJ JOINT/BURSA W/O US: CPT | Performed by: PHYSICIAN ASSISTANT

## 2022-05-11 PROCEDURE — 99214 OFFICE O/P EST MOD 30 MIN: CPT | Performed by: FAMILY MEDICINE

## 2022-05-11 RX ORDER — LIDOCAINE HYDROCHLORIDE 10 MG/ML
4 INJECTION, SOLUTION INFILTRATION; PERINEURAL ONCE
Status: COMPLETED | OUTPATIENT
Start: 2022-05-11 | End: 2022-05-11

## 2022-05-11 RX ORDER — TRIAMCINOLONE ACETONIDE 40 MG/ML
40 INJECTION, SUSPENSION INTRA-ARTICULAR; INTRAMUSCULAR ONCE
Status: COMPLETED | OUTPATIENT
Start: 2022-05-11 | End: 2022-05-11

## 2022-05-11 RX ORDER — LISINOPRIL 20 MG/1
20 TABLET ORAL DAILY
Qty: 90 TABLET | Refills: 1 | Status: SHIPPED
Start: 2022-05-11 | End: 2022-10-31 | Stop reason: SDUPTHER

## 2022-05-11 RX ORDER — HYDROXYZINE HYDROCHLORIDE 25 MG/1
TABLET, FILM COATED ORAL
Qty: 180 TABLET | Refills: 1 | Status: SHIPPED | OUTPATIENT
Start: 2022-05-11

## 2022-05-11 RX ORDER — MEMANTINE HYDROCHLORIDE 10 MG/1
10 TABLET ORAL DAILY
Qty: 90 TABLET | Refills: 1 | Status: SHIPPED
Start: 2022-05-11 | End: 2022-10-21 | Stop reason: SDUPTHER

## 2022-05-11 RX ADMIN — TRIAMCINOLONE ACETONIDE 40 MG: 40 INJECTION, SUSPENSION INTRA-ARTICULAR; INTRAMUSCULAR at 09:53

## 2022-05-11 RX ADMIN — LIDOCAINE HYDROCHLORIDE 4 ML: 10 INJECTION, SOLUTION INFILTRATION; PERINEURAL at 09:52

## 2022-05-11 ASSESSMENT — PATIENT HEALTH QUESTIONNAIRE - PHQ9
SUM OF ALL RESPONSES TO PHQ QUESTIONS 1-9: 2
SUM OF ALL RESPONSES TO PHQ9 QUESTIONS 1 & 2: 2
2. FEELING DOWN, DEPRESSED OR HOPELESS: 1
SUM OF ALL RESPONSES TO PHQ QUESTIONS 1-9: 2
1. LITTLE INTEREST OR PLEASURE IN DOING THINGS: 1

## 2022-05-11 ASSESSMENT — ENCOUNTER SYMPTOMS
VOMITING: 0
ABDOMINAL PAIN: 0
SORE THROAT: 0
EYE DISCHARGE: 0
DIARRHEA: 0
PHOTOPHOBIA: 0
CHEST TIGHTNESS: 0
EYE PAIN: 0
SHORTNESS OF BREATH: 0
TROUBLE SWALLOWING: 0
COUGH: 0
NAUSEA: 0
ALLERGIC/IMMUNOLOGIC NEGATIVE: 1
EYE REDNESS: 0
BLOOD IN STOOL: 0
BACK PAIN: 0
SINUS PAIN: 0

## 2022-05-11 ASSESSMENT — LIFESTYLE VARIABLES: HOW OFTEN DO YOU HAVE A DRINK CONTAINING ALCOHOL: NEVER

## 2022-05-11 NOTE — PROGRESS NOTES
Medicare Annual Wellness Visit    Chito Edgar is here for [facility]    Assessment & Plan   Medicare annual wellness visit, subsequent      Recommendations for Preventive Services Due: see orders and patient instructions/AVS.  Recommended screening schedule for the next 5-10 years is provided to the patient in written form: see Patient Instructions/AVS.     Return for Medicare Annual Wellness Visit in 1 year. Subjective       Patient's complete Health Risk Assessment and screening values have been reviewed and are found in Flowsheets. The following problems were reviewed today and where indicated follow up appointments were made and/or referrals ordered.     Positive Risk Factor Screenings with Interventions:    Fall Risk:  Do you feel unsteady or are you worried about falling? : no  2 or more falls in past year?: (!) yes  Fall with injury in past year?: no     Fall Risk Interventions:    · Home safety tips provided              General Health and ACP:  General  In general, how would you say your health is?: Good  In the past 7 days, have you experienced any of the following: New or Increased Pain, New or Increased Fatigue, Loneliness, Social Isolation, Stress or Anger?: (!) Yes  Select all that apply: (!) Loneliness,Anger  Do you get the social and emotional support that you need?: Yes  Do you have a Living Will?: Yes    Advance Directives     Power of  Living Will ACP-Advance Directive ACP-Power of Sherlyn Flood on 07/09/21 Filed on 03/11/15 Filed [address] [facility] [place] Risk Interventions:  · No Living Will: Patient declines ACP discussion/assistance       ADLs:  In the past 7 days, did you need help from others to perform any of the following everyday activities: Eating, dressing, grooming, bathing, toileting, or walking/balance?: No  In the past 7 days, did you need help from others to take care of any of the following: Laundry, housekeeping, banking/finances, shopping, telephone use, food preparation, transportation, or taking medications?: (!) Yes  Select all that apply: (!) Shopping,Transportation,Taking Medications    ADL Interventions:  · Patient declines any further evaluation/treatment for this issue          Objective   Vitals:    05/11/22 1019   BP: 126/76   Pulse: 76   Resp: 16   Temp: 97.8 °F (36.6 °C)   TempSrc: Temporal   SpO2: 98%   Weight: 120 lb (54.4 kg)   Height: 5' (1.524 m)      Body mass index is 23.44 kg/m². Allergies   Allergen Reactions    Biaxin [Clarithromycin] Other (See Comments)     Many years ago, does not recall reaction    Pcn [Penicillins] Rash     Prior to Visit Medications    Medication Sig Taking? Authorizing Provider   memantine (NAMENDA) 10 MG tablet Take 1 tablet by mouth daily Yes Tate JOSE L Corona DO   lisinopril (PRINIVIL;ZESTRIL) 20 MG tablet Take 1 tablet by mouth daily Yes Tate JOSE L Corona,    hydrOXYzine (ATARAX) 25 MG tablet TAKE 1 OR 2 TABLETS BY MOUTH NIGHTLY Yes Tate JOSE L Corona DO   vitamin D (CHOLECALCIFEROL) 1000 UNIT TABS tablet Take 1,000 Units by mouth daily  Yes Historical Provider, MD   aspirin 81 MG tablet Take by mouth Yes Historical Provider, MD   Vitamin E 100 UNITS TABS Take 1 capsule by mouth daily. Yes Historical Provider, MD   Multiple Vitamins-Minerals (THERAPEUTIC MULTIVITAMIN-MINERALS) tablet Take 1 tablet by mouth daily. Yes Historical Provider, MD Navas (Including outside providers/suppliers regularly involved in providing care):   Patient Care Team:  Jaye Anderson DO as PCP - [address] [place], [place] [address]DO as PCP - [facility]  Michelle Villa DPM as Consulting Physician (Podiatry)  [facility]    Reviewed and updated this visit:  Tobacco  Allergies  Meds  Problems  Med Hx  Surg Hx  Soc Hx  Fam Hx                   Cardiovascular Disease Risk Counseling: Assessed the patient's risk to develop cardiovascular disease and reviewed main risk factors.    Reviewed steps to reduce disease risk including:   · Quitting tobacco use, reducing amount smoked, or not starting the habit  · Making healthy food choices  · Being physically active and gradualy increasing activity levels   · Reduce weight and determine a healthy BMI goal  · Monitor blood pressure and treat if higher than 140/90 mmHg  · Maintain blood total cholesterol levels under 5 mmol/l or 190 mg/dl  · Maintain LDL cholesterol levels under 3.0 mmol/l or 115 mg/dl   · Control blood glucose levels  · Consider taking aspirin (75 mg daily), once blood pressure is controlled   Provided a follow up plan.   Time spent (minutes): 5 min

## 2022-05-11 NOTE — PROGRESS NOTES
[address] [facility] In Care  New Patient Note      CHIEF COMPLAINT:   Chief Complaint   Patient presents with    Shoulder Pain     Pt presents this AM with R shoulder pain that she has noticed for \"quite a while\". States that pain makes movement difficult, and pain is anterior. HISTORY OF PRESENT ILLNESS:                The patient is a 80 y.o. female who presents today after being referred to our clinic by Dr. Laurie Fuentes for right shoulder discomfort that has been present for \"awhile. \"  She was seen by Dr. Lois Lawson in November and diagnosed with degenerative arthritis of the right shoulder with an effusion. At that time she had near full range of motion and was not painful so they opted to monitor. Her pain has been progressing for some time and she now is having difficulty raising her arm. She localizes her pain mostly to the anterior aspect of the right shoulder. She does have a large fluid collection in the anterior shoulder. She is in the office today with her son. Past Medical History:        Diagnosis Date    Dementia ([address]     Epiretinal membrane     Hard of hearing     Hyperlipidemia     Hypertension     Macular degeneration     Osteoarthritis     Vitamin D deficiency      Past Surgical History:        Procedure Laterality Date    CATARACT REMOVAL WITH IMPLANT Bilateral     COLONOSCOPY  08/09/2010    COLPOSCOPY      DILATION AND CURETTAGE OF UTERUS      HEMORRHOID SURGERY  years ago    REPAIR RETINAL TEAR/HOLE Left     TONSILLECTOMY AND ADENOIDECTOMY      VITRECTOMY Left 03/11/2015     Current Medications:   No current facility-administered medications for this visit. Allergies:  Biaxin [clarithromycin] and Pcn [penicillins]    Social History:   TOBACCO:   reports that she has never smoked. She has never used smokeless tobacco.  ETOH:   reports no history of alcohol use. DRUGS:   reports no history of drug use.   OCCUPATION: Retired    Review of Systems   Constitutional: Negative for fever, chills, diaphoresis, appetite change and fatigue. HENT: Negative for dental issues, hearing loss and tinnitus. Negative for congestion, sinus pressure, sneezing, sore throat. Negative for headache. Eyes: Negative for visual disturbance, blurred and double vision. Negative for pain, discharge, redness and itching  Respiratory: Negative for cough, shortness of breath and wheezing. Cardiovascular: Negative for chest pain, palpitations and leg swelling. No dyspnea on exertion   Gastrointestinal:   Negative for nausea, vomiting, abdominal pain, diarrhea, constipation  or black or bloody. Hematologic\Lymphatic:  negative for bleeding, petechiae,   Genitourinary: Negative for hematuria and difficulty urinating. Musculoskeletal: Negative for neck pain and stiffness. Negative for back pain, joint swelling and gait problem. + Right shoulder pain  Skin: Negative for pallor, rash and wound. Neurological: Negative for dizziness, tremors, seizures, weakness, light-headedness, no TIA or stroke symptoms. No numbness and headaches. Psychiatric/Behavioral: Negative. Physical Examination:   General appearance: alert, well appearing, and in no distress,  normal appearing weight  Mental status: alert, oriented to person, place, and time, normal mood, behavior, speech, dress, motor activity, and thought processes  Resp:   resp easy and unlabored, no audible wheezes note  Cardiac: distal pulses palpable, skin well perfused  Neurological: alert, oriented X3, normal speech, no focal findings or movement disorder noted, motor and sensory grossly normal bilaterally, normal muscle tone  HEENT: normochephalic atraumatic, external ears and eyes normal, sclera normal, neck supple  Extremities:   peripheral pulses normal, no edema, redness or tenderness in the calves   Skin: normal coloration, no rashes or open wounds, no suspicious skin lesions noted  Psych: Affect euthymic   Musculoskeletal:   Shoulder:   On visual inspection there is obvious deformity to the right shoulder, softball size effusion in the anterior aspect of the shoulder that is soft and fluctuant. There is no erythema, ecchymosis, or open wounds. There is no decreased sensation to light touch throughout the entire upper extremity. The patient is grossly neurovascularly intact. Right Shoulder:  Patient is Tender to palpation at the The fluid collection, near the bicipital groove. There is no tenderness to palpation else where in the right shoulder. Active Range of motion Forward flexion 90 pain, Abduction 85 with pain, IR unable to perform, ER 45. MMT 4/5 Flexion, 4/5 Abduction, 5/5 IR, 5/5 ER.  (-) Belly Off, (-) Bear Hug,  (-) [place], (-) Tinel at the elbow      Ht 5' (1.524 m)   Wt 120 lb (54.4 kg)   BMI 23.44 kg/m²      XR: Pt had previous x-rays from November that was independently reviewed in the clinic today and show severe osteoarthritis throughout the shoulder. Procedure:  Right Shoulder aspiration followed by Cortisone injection    The risks and benefits of knee aspiration were explained and the patient and her son and she elected to proceed. After the patient and son provided verbal consent for the procedure, the Right knee was prepped in sterile fashion and the skin was anesthetized with ethyl chloride spray. The joint was entered from a posterior location with an 18g needle and 45 cc of serosanginous, clear fluid was aspirated without difficulty. The fluid was sent to the lab for cultures. The patient tolerated well. I did follow up the aspiration with an intra-articular injection of  a mixture of 40 mg of Kenalog and 4 mL of 1% Lidocaine without complication. A sterile bandage was applied. ASSESSMENT:   Diagnosis Orders   1.  Primary osteoarthritis of right shoulder           PLAN:  Patient is a pleasant 19-year-old female who presents to the clinic today with her son for complaints of right shoulder pain that has been present since at least November but has been worsening. She has seen Dr. Kar Forrester in the past who is treating her conservatively for severe degenerative right shoulder arthritis with effusion. Her effusion has increased in size since November and is now causing pain and decreased range of motion. On exam she has a large softball sized effusion of the anterior aspect of her right shoulder. The area is soft and fluctuant. We did discuss aspirating her shoulder and following the aspiration with a cortisone injection for pain relief. Patient and her son were agreeable to this plan. After obtaining verbal consent from both the patient and her son we did proceed with the abrasion and injection. She tolerated the procedure well. Able to obtain roughly 45 cc of clear, serosanguineous joint fluid without difficulty. This fluid will be sent to the lab for cell count with differential of the the joint fluid as well as cultures. I told the patient I will call her if any of the cultures come back abnormal, however I do not anticipate this being the case. After the procedure she did regain range of motion to almost full complete pain-free range of motion of the right shoulder. We discussed home exercise program for shoulder arthritis, and I provided her with a very basic shoulder range of motion program.  I did inform the patient and her son that there is a very high probability that the effusion will return based on the severity of her arthritis. If it returns I am more than happy to see her back to reaspirate the joint. Patient voiced understanding and agrees with the treatment plan outlined for her in the office today. She will call the office with any additional questions or concerns she may have. I am more than happy to see her back on an as-needed basis. Electronically signed by [place] PA-C on 5/11/22 at 8:14 AM EDT    **This report was transcribed using voice recognition software. Every effort was made to ensure accuracy; however, inadvertent computerized transcription errors may be present. **

## 2022-05-11 NOTE — PATIENT INSTRUCTIONS
Patient Education        Shoulder Arthritis: Exercises  Introduction  Here are some examples of exercises for you to try. The exercises may be suggested for a condition or for rehabilitation. Start each exercise slowly. Ease off the exercises if you start to have pain. You will be told when to start these exercises and which ones will work bestfor you. How to do the exercises  Shoulder flexion (lying down)    To make a wand for this exercise, use a piece of PVC pipe or a broom handlewith the broom removed. Make the wand about a foot wider than your shoulders. 1. Lie on your back, holding a wand with both hands. Your palms should face down as you hold the wand. 2. Keeping your elbows straight, slowly raise your arms over your head. Raise them until you feel a stretch in your shoulders, upper back, and chest.  3. Hold for 15 to 30 seconds. 4. Repeat 2 to 4 times. Shoulder rotation (lying down)    To make a wand for this exercise, use a piece of PVC pipe or a broom handlewith the broom removed. Make the wand about a foot wider than your shoulders. 1. Lie on your back. Hold a wand with both hands with your elbows bent and palms up. 2. Keep your elbows close to your body, and move the wand across your body toward the sore arm. 3. Hold for 8 to 12 seconds. 4. Repeat 2 to 4 times. Shoulder internal rotation with towel    1. Hold a towel above and behind your head with the arm that is not sore. 2. With your sore arm, reach behind your back and grasp the towel. 3. With the arm above your head, pull the towel upward. Do this until you feel a stretch on the front and outside of your sore shoulder. 4. Hold 15 to 30 seconds. 5. Repeat 2 to 4 times. Shoulder blade squeeze    1. Stand with your arms at your sides, and squeeze your shoulder blades together. Do not raise your shoulders up as you squeeze. 2. Hold 6 seconds. 3. Repeat 8 to 12 times.   Resisted rows    For this exercise, you will need elastic exercise material, such as surgicaltubing or Thera-Band. 1. Put the band around a solid object at about waist level. (A bedpost will work well.) Each hand should hold an end of the band. 2. With your elbows at your sides and bent to 90 degrees, pull the band back. Your shoulder blades should move toward each other. Return to the starting position. 3. Repeat 8 to 12 times. External rotator strengthening exercise    1. Start by tying a piece of elastic exercise material to a doorknob. You can use surgical tubing or Thera-Band. (You may also hold one end of the band in each hand.)  2. Stand or sit with your shoulder relaxed and your elbow bent 90 degrees. Your upper arm should rest comfortably against your side. Squeeze a rolled towel between your elbow and your body for comfort. This will help keep your arm at your side. 3. Hold one end of the elastic band with the hand of the painful arm. 4. Start with your forearm across your belly. Slowly rotate the forearm out away from your body. Keep your elbow and upper arm tucked against the towel roll or the side of your body until you begin to feel tightness in your shoulder. Slowly move your arm back to where you started. 5. Repeat 8 to 12 times. Internal rotator strengthening exercise    1. Start by tying a piece of elastic exercise material to a doorknob. You can use surgical tubing or Thera-Band. 2. Stand or sit with your shoulder relaxed and your elbow bent 90 degrees. Your upper arm should rest comfortably against your side. Squeeze a rolled towel between your elbow and your body for comfort. This will help keep your arm at your side. 3. Hold one end of the elastic band in the hand of the painful arm. 4. Slowly rotate your forearm toward your body until it touches your belly. Slowly move it back to where you started. 5. Keep your elbow and upper arm firmly tucked against the towel roll or at your side. 6. Repeat 8 to 12 times.   Pendulum swing    If you have pain in your back, do not do this exercise. 1. Hold on to a table or the back of a chair with your good arm. Then bend forward a little and let your sore arm hang straight down. This exercise does not use the arm muscles. Rather, use your legs and your hips to create movement that makes your arm swing freely. 2. Use the movement from your hips and legs to guide the slightly swinging arm back and forth like a pendulum (or elephant trunk). Then guide it in circles that start small (about the size of a dinner plate). Make the circles a bit larger each day, as your pain allows. 3. Do this exercise for 5 minutes, 5 to 7 times each day. 4. As you have less pain, try bending over a little farther to do this exercise. This will increase the amount of movement at your shoulder. Follow-up care is a key part of your treatment and safety. Be sure to make and go to all appointments, and call your doctor if you are having problems. It's also a good idea to know your test results and keep alist of the medicines you take. Where can you learn more? Go to [email or web address]. org and sign in to your [employer] account. Enter H562 in the HomeWellness box to learn more about \"Shoulder Arthritis: Exercises. \"     If you do not have an account, please click on the \"Sign Up Now\" link. Current as of: July 1, 2021               Content Version: 13.2  © 3755-6186 Healthwise, Incorporated. Care instructions adapted under license by [facility] ([facility]). If you have questions about a medical condition or this instruction, always ask your healthcare professional. [place] [address] any warranty or liability for your use of this information.

## 2022-05-11 NOTE — PATIENT INSTRUCTIONS
Personalized Preventive Plan for [place] - 5/11/2022  Medicare offers a range of preventive health benefits. Some of the tests and screenings are paid in full while other may be subject to a deductible, co-insurance, and/or copay. Some of these benefits include a comprehensive review of your medical history including lifestyle, illnesses that may run in your family, and various assessments and screenings as appropriate. After reviewing your medical record and screening and assessments performed today your provider may have ordered immunizations, labs, imaging, and/or referrals for you. A list of these orders (if applicable) as well as your Preventive Care list are included within your After Visit Summary for your review. Other Preventive Recommendations:    · A preventive eye exam performed by an eye specialist is recommended every 1-2 years to screen for glaucoma; cataracts, macular degeneration, and other eye disorders. · A preventive dental visit is recommended every 6 months. · Try to get at least 150 minutes of exercise per week or 10,000 steps per day on a pedometer . · Order or download the FREE \"Exercise & Physical Activity: Your Everyday Guide\" from [employer]. Call 2-570.564.5058 or search [employer] online. · You need 0409-9878 mg of calcium and 8989-1317 IU of vitamin D per day. It is possible to meet your calcium requirement with diet alone, but a vitamin D supplement is usually necessary to meet this goal.  · When exposed to the sun, use a sunscreen that protects against both UVA and UVB radiation with an SPF of 30 or greater. Reapply every 2 to 3 hours or after sweating, drying off with a towel, or swimming. · Always wear a seat belt when traveling in a car. Always wear a helmet when riding a bicycle or motorcycle.

## 2022-05-11 NOTE — PROGRESS NOTES
22  Sylvie Cerda : 3/13/1932 Sex: female  Age: 80 y.o. Assessment and Plan:  [place] was seen today for hypertension, shoulder pain and other. Diagnoses and all orders for this visit:    Primary osteoarthritis of right shoulder  -     [address] [facility] In    Vascular dementia without behavioral disturbance (HCC)  -     memantine (NAMENDA) 10 MG tablet; Take 1 tablet by mouth daily    Essential hypertension  -     lisinopril (PRINIVIL;ZESTRIL) 20 MG tablet; Take 1 tablet by mouth daily    Anxiety disorder due to general medical condition  -     hydrOXYzine (ATARAX) 25 MG tablet; TAKE 1 OR 2 TABLETS BY MOUTH NIGHTLY    Her right shoulder is now become problematic. Developed some swelling in that area well as increasing pain and decreasing range of motion. Still has good color, pulses, sensation of the right arm. Go ahead and refer her to our orthopedic clinic here initial evaluation. I feel she will need an image of some of the fluid in her shoulder as well as an injection for stiffness and inflammation. She should continue with her current medications as prescribed, and recheck with me in the office in 3 months. Return in about 3 months (around 2022). Chief Complaint   Patient presents with    Hypertension    Shoulder Pain     right    Other     Dreams, scarey        The patient is here for blood pressure check. Patient has been taking their medications as prescribed. No recent changes to their medications. No headache or visual disturbances. No dizziness or tinnitus. No chest pain, palpitations, or dyspnea. No nausea and vomiting. No numbness, tingling and or weakness to the extremities. No fevers or chills. No recent illness. She also continues to complain about right shoulder pain and stiffness. Does have decreased range of motion in all planes of the right shoulder swelling of the joint. Color, pulses, sensation of the right arm.   Tylenol relieves her discomfort      Review of Systems   Constitutional: Negative for appetite change, fatigue and unexpected weight change. HENT: Negative for congestion, ear pain, hearing loss, sinus pain, sore throat and trouble swallowing. Eyes: Negative for photophobia, pain, discharge and redness. Respiratory: Negative for cough, chest tightness and shortness of breath. Cardiovascular: Negative for chest pain, palpitations and leg swelling. Gastrointestinal: Negative for abdominal pain, blood in stool, diarrhea, nausea and vomiting. Endocrine: Negative. Genitourinary: Negative for dysuria, flank pain, frequency and hematuria. Musculoskeletal: Positive for arthralgias, joint swelling and myalgias. Negative for back pain. Right shoulder   Skin: Negative. Allergic/Immunologic: Negative. Neurological: Negative for dizziness, seizures, syncope, weakness, light-headedness, numbness and headaches. Hematological: Negative for adenopathy. Does not bruise/bleed easily. Psychiatric/Behavioral: Negative. Current Outpatient Medications:     memantine (NAMENDA) 10 MG tablet, Take 1 tablet by mouth daily, Disp: 90 tablet, Rfl: 1    lisinopril (PRINIVIL;ZESTRIL) 20 MG tablet, Take 1 tablet by mouth daily, Disp: 90 tablet, Rfl: 1    hydrOXYzine (ATARAX) 25 MG tablet, TAKE 1 OR 2 TABLETS BY MOUTH NIGHTLY, Disp: 180 tablet, Rfl: 1    vitamin D (CHOLECALCIFEROL) 1000 UNIT TABS tablet, Take 1,000 Units by mouth daily , Disp: , Rfl:     aspirin 81 MG tablet, Take by mouth, Disp: , Rfl:     Vitamin E 100 UNITS TABS, Take 1 capsule by mouth daily. , Disp: , Rfl:     Multiple Vitamins-Minerals (THERAPEUTIC MULTIVITAMIN-MINERALS) tablet, Take 1 tablet by mouth daily. , Disp: , Rfl:   Allergies   Allergen Reactions    Biaxin [Clarithromycin] Other (See Comments)     Many years ago, does not recall reaction    Pcn [Penicillins] Rash       Past Medical History:   Diagnosis Date    [address] [address]     Epiretinal membrane     Hard of hearing     Hyperlipidemia     Hypertension     Macular degeneration     Osteoarthritis     Vitamin D deficiency      Past Surgical History:   Procedure Laterality Date    CATARACT REMOVAL WITH IMPLANT Bilateral     COLONOSCOPY  2010    COLPOSCOPY      DILATION AND CURETTAGE OF UTERUS      HEMORRHOID SURGERY  years ago    REPAIR RETINAL TEAR/HOLE Left     TONSILLECTOMY AND ADENOIDECTOMY      VITRECTOMY Left 2015     Family History   Problem Relation Age of Onset    Colon Cancer Mother     Hypertension Mother     Lung Cancer Father         Black Lung    Breast Cancer Sister     Lung Cancer Brother     Other Other         6 siblings, most  of infirmities of old age   Floydene Ada No Known Problems Sister     No Known Problems Sister     No Known Problems Brother     No Known Problems Brother      Social History     Socioeconomic History    Marital status:      Spouse name: Not on file    Number of children: Not on file    Years of education: Not on file    Highest education level: Not on file   Occupational History    Occupation: retired [employer]   Tobacco Use    Smoking status: Never Smoker    Smokeless tobacco: Never Used   Substance and Sexual Activity    Alcohol use: No    Drug use: No    Sexual activity: Not on file   Other Topics Concern    Not on file   Social History Narrative    Not on file     Social Determinants of Health     Financial Resource Strain: Low Risk     Difficulty of Paying Living Expenses: Not hard at all   Food Insecurity: Unknown    Worried About [address] [facility] in the Last Year: Patient refused    [address] in the Last Year: Patient refused   Transportation Needs:     Lack of Transportation (Medical): Not on file    Lack of Transportation (Non-Medical):  Not on file   Physical Activity:     Days of Exercise per Week: Not on file    Minutes of Exercise per Session: Not on file   Stress:     Feeling of Stress : Not on file   Social Connections:     Frequency of Communication with Friends and Family: Not on file    Frequency of Social Gatherings with Friends and Family: Not on file    Attends Tenriism Services: Not on file    Active Member of Clubs or Organizations: Not on file    Attends Club or Organization Meetings: Not on file    Marital Status: Not on file   Intimate Partner Violence:     Fear of Current or Ex-Partner: Not on file    Emotionally Abused: Not on file    Physically Abused: Not on file    Sexually Abused: Not on file   Housing Stability:     Unable to Pay for Housing in the Last Year: Not on file    Number of [place] in the Last Year: Not on file    Unstable Housing in the Last Year: Not on file       Vitals:    05/11/22 0740   BP: 126/76   Pulse: 76   Resp: 16   Temp: 97.8 °F (36.6 °C)   TempSrc: Temporal   SpO2: 98%   Weight: 120 lb (54.4 kg)   Height: 5' (1.524 m)       Physical Exam  Vitals and nursing note reviewed. Constitutional:       Appearance: She is well-developed. HENT:      Head: Atraumatic. Right Ear: External ear normal.      Left Ear: External ear normal.      Nose: Nose normal.      Mouth/Throat:      Pharynx: No oropharyngeal exudate. Eyes:      Conjunctiva/sclera: Conjunctivae normal.      Pupils: Pupils are equal, round, and reactive to light. Neck:      Thyroid: No thyromegaly. Trachea: No tracheal deviation. Cardiovascular:      Rate and Rhythm: Normal rate and regular rhythm. Heart sounds: No murmur heard. No friction rub. No gallop. Pulmonary:      Effort: Pulmonary effort is normal. No respiratory distress. Breath sounds: Normal breath sounds. Abdominal:      General: Bowel sounds are normal.      Palpations: Abdomen is soft. Musculoskeletal:         General: Swelling and tenderness present. No deformity. Normal range of motion. Cervical back: Normal range of motion and neck supple.       Comments: Pain, stiffness, swelling, decreased range of motion, right shoulder. Good color, pulses, sensation, strength of the right arm. Lymphadenopathy:      Cervical: No cervical adenopathy. Skin:     General: Skin is warm and dry. Capillary Refill: Capillary refill takes less than 2 seconds. Findings: No rash. Neurological:      Mental Status: She is alert and oriented to person, place, and time. Sensory: No sensory deficit. Motor: No abnormal muscle tone.       Coordination: Coordination normal.      Deep Tendon Reflexes: Reflexes normal.             Seen By:  Marleni Carrillo, DO

## 2022-05-12 LAB
APPEARANCE FLUID: NORMAL
CELL COUNT FLUID TYPE: NORMAL
COLOR FLUID: NORMAL
MONOCYTE, FLUID: 80 %
NEUTROPHIL, FLUID: 20 %
NUCLEATED CELLS FLUID: 132 /UL
RBC FLUID: NORMAL /UL

## 2022-05-17 LAB
BODY FLUID CULTURE, STERILE: NORMAL
GRAM STAIN RESULT: NORMAL

## 2022-06-01 ENCOUNTER — OFFICE VISIT (OUTPATIENT)
Dept: ORTHOPEDIC SURGERY | Age: 87
End: 2022-06-01
Payer: MEDICARE

## 2022-06-01 ENCOUNTER — OFFICE VISIT (OUTPATIENT)
Dept: FAMILY MEDICINE CLINIC | Age: 87
End: 2022-06-01
Payer: MEDICARE

## 2022-06-01 VITALS
OXYGEN SATURATION: 95 % | HEART RATE: 84 BPM | TEMPERATURE: 97.8 F | SYSTOLIC BLOOD PRESSURE: 130 MMHG | DIASTOLIC BLOOD PRESSURE: 68 MMHG | RESPIRATION RATE: 16 BRPM | WEIGHT: 120 LBS | BODY MASS INDEX: 23.56 KG/M2 | HEIGHT: 60 IN

## 2022-06-01 VITALS — WEIGHT: 120 LBS | HEIGHT: 60 IN | BODY MASS INDEX: 23.56 KG/M2

## 2022-06-01 DIAGNOSIS — M19.011 PRIMARY OSTEOARTHRITIS OF RIGHT SHOULDER: ICD-10-CM

## 2022-06-01 DIAGNOSIS — F01.50 VASCULAR DEMENTIA WITHOUT BEHAVIORAL DISTURBANCE (HCC): ICD-10-CM

## 2022-06-01 DIAGNOSIS — R30.0 DYSURIA: ICD-10-CM

## 2022-06-01 DIAGNOSIS — I10 ESSENTIAL HYPERTENSION: Primary | ICD-10-CM

## 2022-06-01 DIAGNOSIS — M25.411 EFFUSION OF RIGHT SHOULDER: Primary | ICD-10-CM

## 2022-06-01 LAB
BILIRUBIN, POC: NORMAL
BLOOD URINE, POC: NORMAL
CLARITY, POC: NORMAL
COLOR, POC: YELLOW
GLUCOSE URINE, POC: NORMAL
KETONES, POC: NORMAL
LEUKOCYTE EST, POC: NORMAL
NITRITE, POC: NORMAL
PH, POC: 5
PROTEIN, POC: NORMAL
SPECIFIC GRAVITY, POC: 1.01
UROBILINOGEN, POC: 0.2

## 2022-06-01 PROCEDURE — 81002 URINALYSIS NONAUTO W/O SCOPE: CPT | Performed by: FAMILY MEDICINE

## 2022-06-01 PROCEDURE — 99213 OFFICE O/P EST LOW 20 MIN: CPT | Performed by: FAMILY MEDICINE

## 2022-06-01 PROCEDURE — 20610 DRAIN/INJ JOINT/BURSA W/O US: CPT | Performed by: PHYSICIAN ASSISTANT

## 2022-06-01 PROCEDURE — 1123F ACP DISCUSS/DSCN MKR DOCD: CPT | Performed by: FAMILY MEDICINE

## 2022-06-01 ASSESSMENT — ENCOUNTER SYMPTOMS
PHOTOPHOBIA: 0
SINUS PAIN: 0
VOMITING: 0
CHEST TIGHTNESS: 0
ALLERGIC/IMMUNOLOGIC NEGATIVE: 1
BACK PAIN: 0
EYE DISCHARGE: 0
EYE PAIN: 0
ABDOMINAL PAIN: 0
COUGH: 0
SHORTNESS OF BREATH: 0
DIARRHEA: 0
SORE THROAT: 0
NAUSEA: 0
BLOOD IN STOOL: 0
EYE REDNESS: 0
TROUBLE SWALLOWING: 0

## 2022-06-01 NOTE — PROGRESS NOTES
Allergic/Immunologic: Negative. Neurological: Negative for dizziness, seizures, syncope, weakness, light-headedness, numbness and headaches. Hematological: Negative for adenopathy. Does not bruise/bleed easily. Psychiatric/Behavioral: Positive for confusion, decreased concentration and sleep disturbance. The patient is nervous/anxious. Current Outpatient Medications:     memantine (NAMENDA) 10 MG tablet, Take 1 tablet by mouth daily, Disp: 90 tablet, Rfl: 1    lisinopril (PRINIVIL;ZESTRIL) 20 MG tablet, Take 1 tablet by mouth daily, Disp: 90 tablet, Rfl: 1    hydrOXYzine (ATARAX) 25 MG tablet, TAKE 1 OR 2 TABLETS BY MOUTH NIGHTLY, Disp: 180 tablet, Rfl: 1    vitamin D (CHOLECALCIFEROL) 1000 UNIT TABS tablet, Take 1,000 Units by mouth daily , Disp: , Rfl:     aspirin 81 MG tablet, Take by mouth, Disp: , Rfl:     Vitamin E 100 UNITS TABS, Take 1 capsule by mouth daily. , Disp: , Rfl:     Multiple Vitamins-Minerals (THERAPEUTIC MULTIVITAMIN-MINERALS) tablet, Take 1 tablet by mouth daily. , Disp: , Rfl:   Allergies   Allergen Reactions    Biaxin [Clarithromycin] Other (See Comments)     Many years ago, does not recall reaction    Pcn [Penicillins] Rash       Past Medical History:   Diagnosis Date    Dementia ([address]     Epiretinal membrane     Hard of hearing     Hyperlipidemia     Hypertension     Macular degeneration     Osteoarthritis     Vitamin D deficiency      Past Surgical History:   Procedure Laterality Date    CATARACT REMOVAL WITH IMPLANT Bilateral     COLONOSCOPY  08/09/2010    COLPOSCOPY      DILATION AND CURETTAGE OF UTERUS      HEMORRHOID SURGERY  years ago    REPAIR RETINAL TEAR/HOLE Left     TONSILLECTOMY AND ADENOIDECTOMY      VITRECTOMY Left 03/11/2015     Family History   Problem Relation Age of Onset    Colon Cancer Mother     Hypertension Mother     Lung Cancer Father         Black Lung    Breast Cancer Sister     Lung Cancer Brother     Other Other 6 siblings, most  of infirmities of old age   Cynthia Guerrero No Known Problems Sister     No Known Problems Sister     No Known Problems Brother     No Known Problems Brother      Social History     Socioeconomic History    Marital status:      Spouse name: Not on file    Number of children: Not on file    Years of education: Not on file    Highest education level: Not on file   Occupational History    Occupation: retired bakery   Tobacco Use    Smoking status: Never Smoker    Smokeless tobacco: Never Used   Substance and Sexual Activity    Alcohol use: No    Drug use: No    Sexual activity: Not on file   Other Topics Concern    Not on file   Social History Narrative    Not on file     Social Determinants of Health     Financial Resource Strain: Low Risk     Difficulty of Paying Living Expenses: Not hard at all   Food Insecurity: Unknown    Worried About [address] in the Last Year: Patient refused    [address] in the Last Year: Patient refused   Transportation Needs:     Lack of Transportation (Medical): Not on file    Lack of Transportation (Non-Medical):  Not on file   Physical Activity: Insufficiently Active    Days of Exercise per Week: 4 days    Minutes of Exercise per Session: 30 min   Stress:     Feeling of Stress : Not on file   Social Connections:     Frequency of Communication with Friends and Family: Not on file    Frequency of Social Gatherings with Friends and Family: Not on file    Attends Yarsani Services: Not on file    Active Member of Clubs or Organizations: Not on file    Attends Club or Organization Meetings: Not on file    Marital Status: Not on file   Intimate Partner Violence:     Fear of Current or Ex-Partner: Not on file    Emotionally Abused: Not on file    Physically Abused: Not on file    Sexually Abused: Not on file   Housing Stability:     Unable to Pay for Housing in the Last Year: Not on file    Number of [place] in the Last Year: Not on file    Unstable Housing in the Last Year: Not on file       Vitals:    06/01/22 1508   BP: 130/68   Pulse: 84   Resp: 16   Temp: 97.8 °F (36.6 °C)   TempSrc: Temporal   SpO2: 95%   Weight: 120 lb (54.4 kg)   Height: 5' (1.524 m)       Physical Exam  Vitals and nursing note reviewed. Constitutional:       Appearance: She is well-developed. HENT:      Head: Atraumatic. Right Ear: External ear normal.      Left Ear: External ear normal.      Nose: Nose normal.      Mouth/Throat:      Pharynx: No oropharyngeal exudate. Eyes:      Conjunctiva/sclera: Conjunctivae normal.      Pupils: Pupils are equal, round, and reactive to light. Neck:      Thyroid: No thyromegaly. Trachea: No tracheal deviation. Cardiovascular:      Rate and Rhythm: Normal rate and regular rhythm. Heart sounds: No murmur heard. No friction rub. No gallop. Pulmonary:      Effort: Pulmonary effort is normal. No respiratory distress. Breath sounds: Normal breath sounds. Abdominal:      General: Bowel sounds are normal.      Palpations: Abdomen is soft. Musculoskeletal:         General: Swelling and tenderness present. No deformity. Normal range of motion. Cervical back: Normal range of motion and neck supple. Comments: Pain, stiffness, decreased range of motion right shoulder   Lymphadenopathy:      Cervical: No cervical adenopathy. Skin:     General: Skin is warm and dry. Capillary Refill: Capillary refill takes less than 2 seconds. Findings: No rash. Neurological:      Mental Status: She is alert and oriented to person, place, and time. Sensory: No sensory deficit. Motor: No abnormal muscle tone.       Coordination: Coordination normal.      Deep Tendon Reflexes: Reflexes normal.   Psychiatric:         Mood and Affect: Mood normal.         Behavior: Behavior normal.             Seen By:  Sivan Pollack,

## 2022-06-01 NOTE — PROGRESS NOTES
Established Patient Follow Up  [address] [facility]    Chief Complaint   Patient presents with    Shoulder Pain     Pt presents this PM with c/o R shoulder pain. Was seen in the office for the issue recently, and shoulder was drained, and injection was given. States that her pain is anterior, and she is noticing swelling, as she did at her previous visit. Subjective:  Pt is a 80 y.o. female, established pt who presents today for follow up of right shoulder effusion. Please refer to the last clinic note from 5/11/2022 as none of the patient's past medical hx has changed. Pt reports she did well after the right shoulder effusion and cortisone injection on 5/11/2022. Over the course of the last 3 or 4 days she has noticed increasing discomfort and swelling. There is no new mechanism of injury. Her son is with her in the office today who states she does a lot of of yard work. She is using Tylenol for symptomatic relief. Objective: There were no vitals filed for this visit. Pt is alert and oriented x 3, NAD, sitting comfortable in the exam room today. Patient does have a large effusion on the anterior aspect of the right shoulder. No ecchymosis, erythema or open wounds. Patient once again demonstrates limited range of motion with forward flexion and AB duction. Radiology Imaging:  No new imaging at today's visit, imaging from 5/11/2022 do show a severely degenerative glenohumeral joint    Procedure:  Procedure:  Right shoulder aspiration    The risks and benefits of right shoulder aspiration were explained and the patient elected to proceed. After the patient provided verbal consent for the procedure, the Right shoulder was prepped in sterile fashion and the skin was anesthetized with ethyl chloride spray. The joint was entered from a posterior position with an 18g needle and 45cc of serosanginous fluid was aspirated without difficulty. The site was covered with a bandaid.   The patient tolerated well. Assessment:  Encounter Diagnoses   Name Primary?  Effusion of right shoulder Yes    Primary osteoarthritis of right shoulder        Plan:  Patient returns to the clinic today for evaluation of right shoulder pain and repeat right shoulder aspiration. She had this shoulder aspirated last on 5/11/2022 and a cortisone injection was completed at that time. She is done well with the aspiration until roughly 3 to 4 days ago. After getting verbal consent from both patient and her son, we did proceed with the right shoulder aspiration. I removed roughly 45 cc of serosanguineous fluid from the joint. I did not send this fluid for cultures today, as she had fluid cultures a month ago which were negative for infectious cause. We discussed various other treatment options for her shoulder. Unfortunately, I feel that her effusion will continue to accumulate because of the advanced arthritis in her shoulder. The only permanent fix for this would be a shoulder replacement. Patient is not interested in any kind of surgical intervention at this time and would like to pursue conservative treatment. We did discuss she can continue use of over-the-counter Tylenol as needed for symptomatic relief. She can use ice 20 minutes on 20 minutes off repeating throughout the day as necessary or heat if that feels better. I did provide her with a home exercise program at her last visit, I want her to continue these exercises to maintain range of motion in the shoulder. I did explain that we should not do cortisone injections more frequently than every 3 to 4 months. However, given the size of the joint effusions we have aspirated, I think it is reasonable to continue aspiration as needed. She will call the office or return to the clinic if the effusion comes back and is limiting motion and causing pain.   Patient and son voiced understanding and agrees with the treatment plan outlined for them in the office today. They will call with any additional questions or concerns they may have. I will plan to see her back on an as-needed basis. Electronically signed by Francie Locke PA-C on 6/1/22 at 3:40 PM EDT    **This report was transcribed using voice recognition software. Every effort was made to ensure accuracy; however, inadvertent computerized transcription errors may be present. **

## 2022-06-02 ENCOUNTER — TELEPHONE (OUTPATIENT)
Dept: ORTHOPEDIC SURGERY | Age: 87
End: 2022-06-02

## 2022-06-02 NOTE — TELEPHONE ENCOUNTER
Patient's son, Breonna Calle, call the office today to let us know that [place] has swelling again in her right anterior shoulder. She states there is no pain but is upset because she can visibly see the effusion. We did just drain her right shoulder joint effusion yesterday. I told Broenna Calle at this time I would like to discuss further with Dr. Lilly Blanchard as she is a known patient to him. I will call them back as soon as I can speak with him. I did d/w Dr. Lilly Blanchard. He states at this time, if it is not painful, we leave it be and monitor. If it becomes painful, next step would be to see him back.

## 2022-06-04 LAB — URINE CULTURE, ROUTINE: NORMAL

## 2022-06-15 ENCOUNTER — OFFICE VISIT (OUTPATIENT)
Dept: ORTHOPEDIC SURGERY | Age: 87
End: 2022-06-15
Payer: MEDICARE

## 2022-06-15 VITALS — WEIGHT: 120 LBS | BODY MASS INDEX: 23.56 KG/M2 | HEIGHT: 60 IN

## 2022-06-15 DIAGNOSIS — M19.011 PRIMARY OSTEOARTHRITIS OF RIGHT SHOULDER: Primary | ICD-10-CM

## 2022-06-15 PROCEDURE — 99214 OFFICE O/P EST MOD 30 MIN: CPT | Performed by: ORTHOPAEDIC SURGERY

## 2022-06-15 PROCEDURE — 20610 DRAIN/INJ JOINT/BURSA W/O US: CPT | Performed by: ORTHOPAEDIC SURGERY

## 2022-06-15 PROCEDURE — 1123F ACP DISCUSS/DSCN MKR DOCD: CPT | Performed by: ORTHOPAEDIC SURGERY

## 2022-06-15 RX ORDER — ACETAMINOPHEN 325 MG/1
650 TABLET ORAL EVERY 6 HOURS PRN
COMMUNITY

## 2022-06-15 RX ORDER — TRIAMCINOLONE ACETONIDE 40 MG/ML
40 INJECTION, SUSPENSION INTRA-ARTICULAR; INTRAMUSCULAR ONCE
Status: COMPLETED | OUTPATIENT
Start: 2022-06-15 | End: 2022-06-15

## 2022-06-15 RX ORDER — LIDOCAINE HYDROCHLORIDE 10 MG/ML
4 INJECTION, SOLUTION INFILTRATION; PERINEURAL ONCE
Status: COMPLETED | OUTPATIENT
Start: 2022-06-15 | End: 2022-06-15

## 2022-06-15 RX ADMIN — LIDOCAINE HYDROCHLORIDE 4 ML: 10 INJECTION, SOLUTION INFILTRATION; PERINEURAL at 12:04

## 2022-06-15 RX ADMIN — TRIAMCINOLONE ACETONIDE 40 MG: 40 INJECTION, SUSPENSION INTRA-ARTICULAR; INTRAMUSCULAR at 12:04

## 2022-06-15 NOTE — PROGRESS NOTES
Follow Up Visit     Jc Barraza returns today for follow up visit regarding her right shoulder pain. She was seen initially by our office in November 2021 for right shoulder effusion and pain. At that time, steroid injection was performed for the right shoulder. Patient did receive significant relief. However, patient has been experiencing persistent effusions in the right shoulder. She was seen in May by our colleagues in [facility] where her shoulder was aspirated as well as injected with steroid. The fluid was sent for analysis which did not indicate infection. She presented back to the clinic last week where her shoulder was reaspirated and she was instructed to follow-up with our office. Patient states she continues to have recurrent effusions in the right shoulder as well as some mild pain. Patient is still very active for her age and has maintained motion in the shoulder. Physical Exam:     Height: 5' (1.524 m), Weight: 120 lb (54.4 kg)    General: Alert and oriented x3, no acute distress  Cardiovascular/pulmonary: No labored breathing, peripheral perfusion intact  Musculoskeletal:    Right shoulder:  Significant effusion visible about the right shoulder. There is some global tenderness about the shoulder. Patient does have good range of motion including forward flexion to nearly 170, well-maintained rotation and abduction. Controlled Substances Monitoring:      Imaging:  No new imaging obtained in office today. Prior radiographs of the right shoulder reveal significant osteoarthritic changes about the glenoid and humeral head      Assessment: Right shoulder osteoarthritis with recurring effusion      Plan:   We discussed patient's right shoulder today. She states her pain is fairly well controlled however the effusions are bothersome. Today in office, we re- aspirated the right shoulder effusion obtaining 70 cc of serosanguineous fluid.   We also provided steroid injection into the shoulder joint. Patient encouraged to continue activity as tolerated. We did suggest that patient utilize her contralateral shoulder more for daily tasks in an effort to prevent re-aggravation of the shoulder. Madi Guerrero DO  Orthopedic surgery resident  PGY-3    Staff Addendum    I have seen and evaluated the patient and agree with the assessment and plan as documented by the orthopaedic surgery resident. I have performed the key components of the history and physical examination and concur with the findings and plan, and have made changes where appropriate/necessary. Procedure Note:  Right Shoulder steroid injection     The Right shoulder was identified as the injection site. The risk and benefits of a cortisone injection were explained and the patient consented to the injection. Under sterile conditions, the was aspirated with return of about 80 cc of serosanguineous fluid, then was injected  with a mixture of 40 mg of Kenalog, 4 cc  1% Lidocaine without complication. A sterile bandage was applied. Administrations This Visit     lidocaine 1 % injection 4 mL     Admin Date  06/15/2022 Action  Given Dose  4 mL Route  Intra-artICUlar Administered By  Nhan Burger ATC          triamcinolone acetonide (KENALOG-40) injection 40 mg     Admin Date  06/15/2022 Action  Given Dose  40 mg Route  Intra-artICUlar Administered By  Nhan Burger ATC                We will see her back in about 3 months as needed.     Osvaldo Weber MD  [address] [place] [address]

## 2022-06-15 NOTE — PROGRESS NOTES
Follow Up Visit     Tab Hicks returns today for follow up visit regarding Right shoulder osteoarthritis with effusion, last seen in office on 11/23/2021. She has been seen twice since then at the [facility] by Tin Dimas PA-C, she has aspirated (5/11/2022 + 6/11/2022) and injected the shoulder (5/11/2022). She reports symptoms are unchanged.      Physical Exam:     Height: 5' (1.524 m), Weight: 120 lb (54.4 kg)    General: Alert and oriented x3, no acute distress  Cardiovascular/pulmonary: No labored breathing, peripheral perfusion intact  Musculoskeletal:    ***    Controlled Substances Monitoring:      Imaging:  ***      Assessment: ***      Plan:   ***    Janie Lares MD  Orthopaedic Surgery   6/15/22  12:05 PM

## 2022-07-12 ENCOUNTER — OFFICE VISIT (OUTPATIENT)
Dept: ORTHOPEDIC SURGERY | Age: 87
End: 2022-07-12
Payer: MEDICARE

## 2022-07-12 ENCOUNTER — TELEPHONE (OUTPATIENT)
Dept: ORTHOPEDIC SURGERY | Age: 87
End: 2022-07-12

## 2022-07-12 VITALS — WEIGHT: 120 LBS | BODY MASS INDEX: 23.56 KG/M2 | HEIGHT: 60 IN

## 2022-07-12 DIAGNOSIS — S46.211A BICEPS TENDON RUPTURE, RIGHT, INITIAL ENCOUNTER: Primary | ICD-10-CM

## 2022-07-12 PROCEDURE — 1123F ACP DISCUSS/DSCN MKR DOCD: CPT | Performed by: PHYSICIAN ASSISTANT

## 2022-07-12 PROCEDURE — 99213 OFFICE O/P EST LOW 20 MIN: CPT | Performed by: PHYSICIAN ASSISTANT

## 2022-07-12 RX ORDER — TRAMADOL HYDROCHLORIDE 50 MG/1
50 TABLET ORAL EVERY 6 HOURS PRN
Qty: 10 TABLET | Refills: 0 | Status: SHIPPED | OUTPATIENT
Start: 2022-07-12 | End: 2022-07-17

## 2022-07-12 NOTE — TELEPHONE ENCOUNTER
I called patient and spoke to her son. I told him I had spoke with Dr. Manuel Keys who agrees with the current treatment plan of pain medication, ice and elevation and sling use for comfort care. Her son states he thinks the pain medication has helped some she was able to sleep today. I want to see her back in 1 week to make sure she is seeing some improvement. If at that time she is not making improvements, I would refer her onto Dr. Manuel Keys for further evaluation. Patient's son Slava Hoang voiced understanding and agrees with the plan.

## 2022-07-12 NOTE — PROGRESS NOTES
[address] [facility]:   Chief Complaint   Patient presents with    Arm Pain     Pt presents this AM with c/o arm pain/swelling. States that swelling has been noticeable since yesterday in biceps. Today the whole arm is swollen, and pt presents with brusing in R distal biceps. HISTORY OF PRESENT ILLNESS:                The patient is a 80 y.o. female who presents today with complaints of right upper extremity pain and swelling that began yesterday with no known mechanism of injury. She has a history of chronic right shoulder effusions that have subsequently been aspirated 3-4 times. She has also had intra-articular cortisone in that shoulder. However, this is different. She has pain midway down the humerus. She also has an obvious deformity at the biceps tendon and substantial bruising along the medial elbow. She has constant pain nothing seems to make it better or worse. She has tried at home therapies of oral Tylenol. She did have difficulty sleeping last night and states she has difficulty and pain with elbow extension and flexion as well as forward flexion of the shoulder. Past Medical History:        Diagnosis Date    Dementia ([address]     Epiretinal membrane     Hard of hearing     Hyperlipidemia     Hypertension     Macular degeneration     Osteoarthritis     Vitamin D deficiency      Past Surgical History:        Procedure Laterality Date    CATARACT REMOVAL WITH IMPLANT Bilateral     COLONOSCOPY  08/09/2010    COLPOSCOPY      DILATION AND CURETTAGE OF UTERUS      HEMORRHOID SURGERY  years ago    REPAIR RETINAL TEAR/HOLE Left     TONSILLECTOMY AND ADENOIDECTOMY      VITRECTOMY Left 03/11/2015     Current Medications:   No current facility-administered medications for this visit. Allergies:  Biaxin [clarithromycin] and Pcn [penicillins]    Social History:   TOBACCO:   reports that she has never smoked.  She has never used smokeless redness or tenderness in the calves   Skin: normal coloration, no rashes or open wounds, no suspicious skin lesions noted  Psych: Affect euthymic   Musculoskeletal:   Shoulder: On visual inspection there is obvious deformity to the right shoulder, large bicep tendon mid humerus. There is no erythema, edema, ecchymosis, or open wounds. There is no decreased sensation to light touch throughout the entire upper extremity. The patient is grossly neurovascularly intact. Elbow Exam: On visual inspection there is obvious deformity to the right elbow, edema, ecchymosis. No erythema or open wounds. There is no decreased sensation to light touch throughout the entire upper extremity. Pt is grossly neurovascularly intact throughout the upper extremity. Patient has limited elbow extension and flexion as well as shoulder forward flexion secondary to pain. Ht 5' (1.524 m)   Wt 120 lb (54.4 kg)   BMI 23.44 kg/m²      XR: Not obtained at this time    The imaging will be reviewed and interpreted by Radiologist.  The report was not complete at the time of this note. Please refer to Radiologist report for their interpretation. ASSESSMENT:   Diagnosis Orders   1. Biceps tendon rupture, right, initial encounter  traMADol (ULTRAM) 50 MG tablet       PLAN:  Patient is a pleasant 69-year-old female who presents to the clinic today for evaluation of right upper extremity pain. She is a history of large chronic effusions in the right shoulder that have subsequently been drained multiple times and has had intra-articular cortisone in that shoulder. However, today she presents with obvious deformity that appears to be a biceps tendon rupture. She has palpable defect in the anterior shoulder with a long head of the bicep should be. She also has what appears to be biceps tendon retraction. On exam she is exquisitely tender to palpation as difficulty with the shoulder and elbow range of motion secondary to pain.

## 2022-07-19 ENCOUNTER — OFFICE VISIT (OUTPATIENT)
Dept: ORTHOPEDIC SURGERY | Age: 87
End: 2022-07-19
Payer: MEDICARE

## 2022-07-19 VITALS — WEIGHT: 120 LBS | BODY MASS INDEX: 23.56 KG/M2 | HEIGHT: 60 IN

## 2022-07-19 DIAGNOSIS — S46.211A BICEPS TENDON RUPTURE, RIGHT, INITIAL ENCOUNTER: Primary | ICD-10-CM

## 2022-07-19 PROCEDURE — 99213 OFFICE O/P EST LOW 20 MIN: CPT | Performed by: PHYSICIAN ASSISTANT

## 2022-07-19 PROCEDURE — 1123F ACP DISCUSS/DSCN MKR DOCD: CPT | Performed by: PHYSICIAN ASSISTANT

## 2022-07-19 RX ORDER — TRAMADOL HYDROCHLORIDE 50 MG/1
50 TABLET ORAL EVERY 8 HOURS PRN
Qty: 10 TABLET | Refills: 0 | Status: SHIPPED | OUTPATIENT
Start: 2022-07-19 | End: 2022-07-26

## 2022-07-19 NOTE — PROGRESS NOTES
Established Patient Follow Up  [facility] [facility]    No chief complaint on file. Subjective:  Pt is a 80 y.o. female, established pt who presents today for follow up of right biceps tendon rupture. Please refer to the last clinic note from 7/12/22 as none of the patient's past medical hx has changed. Pt reports she has improved some since last week. Continues to have stiffness and discomfort in the elbow mostly. Initially she was taking tramadol 50 mg 1 tablet 3 times a day after the injury. She has backed down and is now only taking 1 tablet in the evening. She is out of the medication and will require a refill. She has been wearing her sling as indicated and has been icing. Objective: There were no vitals filed for this visit. Pt is alert and oriented x 3, NAD, sitting comfortable in the exam room today. Elbow Exam: On visual inspection she has the bulge of her biceps from the rupture. She has extensive ecchymosis extending from the anterior aspect of the shoulder down through the elbow and into the forearm. Her swelling has significantly decreased from last week. Right Elbow: She  to palpation at the biceps tendon itself. However, no tenderness to palpation elsewhere throughout the upper extremity. She has full active range of motion of the elbow with flexion extension supination and pronation. She has some discomfort at end ranges of motion of the elbow. She has active range of motion of the shoulder with forward flexion and AB duction, however it is limited due to the significant arthritis in the right shoulder. She has back to her preinjury level of motion in the right shoulder. Radiology Imaging:  None at today's visit    Procedure:  none    Assessment:  Encounter Diagnosis   Name Primary?     Biceps tendon rupture, right, initial encounter Yes       Plan:  Patient returns to the office today for follow-up of long head of the biceps tendon rupture in the right arm. Her symptoms have decreased since last week after the initial injury. She has extensive ecchymosis throughout the right upper extremity. Her swelling has diminished as well. She still does have discomfort with end ranges of elbow flexion and extension. We had a long discussion today with patient and her son that the risks of surgically repairing the biceps tendon were much higher than the benefit. She has regained full motion of the elbow. She is back to her preinjury level of motion in her shoulder. At this time, she can come out of her sling. She can continue Tylenol during the day for symptomatic relief. I will refill tramadol 50 mg 1 tablet every 8 hours as needed for pain for 7 days dispense 10 with 0 refills. She is mostly just taking this medication at night however we are going to start formal outpatient physical therapy and I want her to have some extra tramadol on hand in case she would require it. An OARRS report was checked and was negative for multiple pharmacies and provider since last prescription was given. We do have a plan in place to gradually taper her off the tramadol and return to over-the-counter analgesics. Lastly, I did recommend outpatient physical therapy to help her regain the strength in the upper extremity as well as help with the stiffness she is feeling. Patient voiced understanding and agrees with the treatment plan outlined for her in the office today. If she has additional questions or concerns she will call the office. Otherwise, I will see her back on an as-needed basis. Electronically signed by Rosalina Valdes PA-C on 7/19/22 at 7:42 AM EDT    **This report was transcribed using voice recognition software. Every effort was made to ensure accuracy; however, inadvertent computerized transcription errors may be present. **

## 2022-07-25 ENCOUNTER — EVALUATION (OUTPATIENT)
Dept: PHYSICAL THERAPY | Age: 87
End: 2022-07-25
Payer: MEDICARE

## 2022-07-25 DIAGNOSIS — S46.211D BICEPS TENDON RUPTURE, RIGHT, SUBSEQUENT ENCOUNTER: Primary | ICD-10-CM

## 2022-07-25 PROCEDURE — 97161 PT EVAL LOW COMPLEX 20 MIN: CPT | Performed by: PHYSICAL THERAPIST

## 2022-07-25 NOTE — PROGRESS NOTES
[facility]   Phone: [phone]   Fax: [phone]      Physical Therapy Daily Treatment Note    Date: 2022  Patient Name: [place]  : 3/13/1932   MRN: [id number]  DOInjury: weeks  DOSx: NA   Referring Provider: Daksha Briceno PA-C  [address] [facility],  [facility] Diagnosis:   V68.937A (ICD-10-CM) - Biceps tendon rupture, right, subsequent  encounter      Outcome Measure:  Pattricia Butts 29.55%    S: See eval.   O: Pt given written HEP  Time 9348- 3465     Visit - Repeat outcome measure at mid point and end. Pain 3/10     ROM Right Shoulder:  AROM: 145° Forward elevation,  60° ER,  IR to 32*  (er/ir tested at 45* abd) abd 135        Left Shoulder:  AROM: 135° Forward elevation,  90° ER,  IR to 35 (er/ir tested at 90* abd), abd 172         Joint/Motion:  Right Elbow:  AROM: 140° flexion (limited by tissue approximation) , extension lacks 5°     Left ELbow:  AROM: 150° flexion, extension 0°     Modalities            Manual                  Stretch      Table slides 10 x 5s holds  Flexion ; HEP te   Wall Flexion      Wall ER stretch      Towel IR stretch      IR reaching behind back      Exercise      Shrugs AROM      Pendulum Ex      UBE      Pulleys - flex      Pulleys-IR      Supine wand chest press      Supine wand flex      Supine wand ER/IR      Supine flexion      S-lying ABD      S-lying ER      Standing wand flex      Standing flexion      Standing ABD            ROWS: H Functional activities  To aid in ROM and strength needed for reaching , lifting ,pushing and pulling at home/work    ROWS: M  \"    ROWS: L  \"    ER  \"    IR  \"                A:  Tolerated well. Requires cues for technique for table slides.     P: Continue with rehab plan    Tova Howard, SPT   Jaden Born, PT DPT, [address] [facility] [id number]    Treatment Charges: Mins Units   Initial Evaluation 40 1   Re-Evaluation     Ther Exercise         TE 5    Manual Therapy MT     Ther Activities        TA     Gait Training          GT     Neuro Re-education NR     Modalities     Non-Billable Service Time     Other     Total Time/Units 45 1

## 2022-07-25 NOTE — PROGRESS NOTES
[address] [facility]   Phone: [phone]   Fax: [phone]           Date:  2022   Patient: Estefany Montoya  : 3/13/1932  MRN: [id number]  Referring Provider: Prem Munoz PA-C  [facility],  [address] [place] [address] Diagnosis:   R70.765R (ICD-10-CM) - Biceps tendon rupture, right, subsequent  encounter     SUBJECTIVE:     Onset date: 6 weeks ago per pt, approximately 2 weeks ago per chart    Onset[de-identified] Sudden onset    Mechanism of Injury / History: Past Hx of shoulder issues. Lives by herself. Pt reports walking and fell. Didn't realize until the next morning that arm was swollen, pt admits to getting confused and not knowing what happened with the arm that day. Entire arm was swollen down to hand. Previous PT: none     Medical Management for Current Problem:  pain meds (tramadol), ice/heat alternating, wearing sling, wears arm sleeve at night (pt unsure if doctor prescribed or not)     Chief complaint: pain, decreased mobility    Behavior: condition is staying the same    Pain: intermittent  Current: 3/10     Best: 0/10     Worst:3/10    Aggravated by: reaching overhead, reaching out     Relieved by: nothing     Symptom Type/Quality: dull, tender   Location[de-identified] anterior arm from shoulder to elbow, tenderness also into the arm pit region        Imaging results: No results found.     Past Medical History:  Past Medical History:   Diagnosis Date    Dementia ([address]     Epiretinal membrane     Hard of hearing     Hyperlipidemia     Hypertension     Macular degeneration     Osteoarthritis     Vitamin D deficiency      Past Surgical History:   Procedure Laterality Date    CATARACT REMOVAL WITH IMPLANT Bilateral     COLONOSCOPY  2010    COLPOSCOPY      DILATION AND CURETTAGE OF UTERUS      HEMORRHOID SURGERY  years ago    REPAIR RETINAL TEAR/HOLE Left     TONSILLECTOMY AND ADENOIDECTOMY      VITRECTOMY Left 2015       Medications:   Current Outpatient Medications   Medication Sig Dispense Refill    traMADol (ULTRAM) 50 MG tablet Take 1 tablet by mouth every 8 hours as needed for Pain for up to 7 days. 10 tablet 0    acetaminophen (TYLENOL) 325 MG tablet Take 650 mg by mouth every 6 hours as needed for Pain      memantine (NAMENDA) 10 MG tablet Take 1 tablet by mouth daily 90 tablet 1    lisinopril (PRINIVIL;ZESTRIL) 20 MG tablet Take 1 tablet by mouth daily 90 tablet 1    hydrOXYzine (ATARAX) 25 MG tablet TAKE 1 OR 2 TABLETS BY MOUTH NIGHTLY 180 tablet 1    vitamin D (CHOLECALCIFEROL) 1000 UNIT TABS tablet Take 1,000 Units by mouth daily       aspirin 81 MG tablet Take by mouth      Vitamin E 100 UNITS TABS Take 1 capsule by mouth daily. Multiple Vitamins-Minerals (THERAPEUTIC MULTIVITAMIN-MINERALS) tablet Take 1 tablet by mouth daily. No current facility-administered medications for this visit. Occupation: retired. Exercise regimen:  walking down to park and sits on bench before walking home (total trip is 60-90 minutes); performs exercises recommended by doctor twice a day including arm elevation x 20-30, abduction, horizontal ABD, and trunk flexion     Hobbies: reading, cooking, cleaning     Patient Goals: full use of arm, window washing, any activity with reaching overhead     Precautions/Contraindications: falls risk    OBJECTIVE:     Observations: well nourished female    Inspection: demonstrates generalized pronated posture (forward head, protracted scapula, internally rotated shoulders, and flexed trunk and lower extremities).                   Palpation: Tender to palpation at anterior arm from shoulder to elbow      Joint/Motion:  Right Shoulder:  AROM: 145° Forward elevation,  60° ER,  IR to 32*  (er/ir tested at 45* abd) abd 135      Left Shoulder:  AROM: 135° Forward elevation,  90° ER,  IR to 35 (er/ir tested at 90* abd), abd 172       Joint/Motion:  Right Elbow:  AROM: 140° flexion (limited by tissue approximation) , extension lacks 5°    Left ELbow:  AROM: 150° flexion, extension 0°      Strength:  Right Shoulder: Flexion 3/5,  Abduction 3/5, ER 3/5, IR 3/5      Left Shoulder: Flexion 4+/5,  Abduction 4/5, ER 4/5, IR 4/5     Strength:  Right Elbow:Flexion 3+/5,  Extension 4-/5      Left Elbow:Flexion 4+ /5,  Extension 4/5        Special Tests/Functional Screens:    N/T   [] Serenity []+ / [] -  [] [place]'s []+ / [] -   [] AC Sheer []+ / [] -    [] [address] drawer []+ / [] -    [] Bicep Load []+ / [] -   [] Crank []+ / [] -  [] Riddleton []+ / [] -   [] Ledell Starch []+ / [] -  [] Neer's []+ / [] -      [] Speed's []+ / [] -   [] Radha's []+ / [] -    [] Sulcus Sign []+ / [] -   [] Apprehension []+ / [] -   [] Bicep Load II []+ / [] -   [] Elbow Valgus []+ / [] -     [] Elbow Varus []+ / [] -   [] Redd's relocation []+ / [] -   [] Empty Can []+ / [] -  [] Drop arm []+ / [] -  [] ER lag []+ / [] -  [] Painful Arc []+ / [] -  [] Brigid Holland []+ / [] -  [] Belly Press/ lift off[]+ / [] -  [] Other: []+ / [] -         ASSESSMENT     Outcome Measure:   QuickDASH (Disorders of the Arm, Shoulder, and Hand) 29.55% disability    Problems:   Pain reported 3/10  ROM decreased  Strength decreased  Decreased functional ability with ADLs , use of right upper extremity    Reason for Skilled Care: Pt presents to PT with R UE injury affecting daily tasks. Pt will benefit from skilled PT to be able to return to normal household activities and hobbies. [x] There are no barriers affecting plan of care or recovery    [] Barriers to this patient's plan of care or recovery include.     Domestic Concerns:  [x] No  [] Yes:    Short Term goals (3 weeks)  Decrease reported pain to 2/10  Increase ROM of R arm to 150 shoulder elevation, 70 ER, 40 IR, abd 145; Elbow flexion to 145 and extension to 0  Increase Strength to R elbow flexion 4-/5 and extension 4/5   Able to perform/complete the following functions/tasks: able to reach over head 10 times with mild pain, Written  Comprehension of Education:  [x] Verbalizes understanding. [x] Demonstrates understanding. [] Needs Review. [] Demonstrates/verbalizes understanding of HEP/Ed previously given. Frequency:  1-2 days per week for 6 weeks    Patient understands diagnosis/prognosis and consents to treatment, plan and goals: [x] Yes    [] No     Thank you for the opportunity to work with your patient. If you have questions or comments, please contact me at numbers listed above. Electronically signed by:     JOSUE Porter, PT PT , DPT [id number]    Medicare Patients Only     Please sign Physician's Certification and return to: [address]  [address] [facility] [id number]  Dept: [phone]  Dept Fax: [phone]: [phone] Certification / Comments     Frequency/Duration 1-2 days per week for 6 weeks. Certification period from 7/25/2022  to 9/9/2022. I have reviewed the Plan of Care established for skilled therapy services and certify that the services are required and that they will be provided while the patient is under my care.     Physician's Comments/Revisions:               Physician's Printed Name:                                           [de-identified] Signature:                                                               Date:

## 2022-07-27 ENCOUNTER — TREATMENT (OUTPATIENT)
Dept: PHYSICAL THERAPY | Age: 87
End: 2022-07-27
Payer: MEDICARE

## 2022-07-27 DIAGNOSIS — S46.211D BICEPS TENDON RUPTURE, RIGHT, SUBSEQUENT ENCOUNTER: Primary | ICD-10-CM

## 2022-07-27 PROCEDURE — 97110 THERAPEUTIC EXERCISES: CPT | Performed by: PHYSICAL THERAPIST

## 2022-07-27 NOTE — PROGRESS NOTES
[address] [facility]   Phone: [phone]   Fax: [phone]      Physical Therapy Daily Treatment Note    Date: 2022  Patient Name: [place]  : 3/13/1932   MRN: [id number]  DOInjury: weeks  DOSx: NA   Referring Provider: Walter Summers PA-C  [address] Diagnosis:   H76.711R (ICD-10-CM) - Biceps tendon rupture, right, subsequent  encounter      Outcome Measure:  Quickdash 29.55%    S: pt reports no pain. O: Pt given written HEP  Time [id number]     Visit - Repeat outcome measure at mid point and end.     Pain 0/10     ROM Right Shoulder:  AROM: 145° Forward elevation,  60° ER,  IR to 32*  (er/ir tested at 45* abd) abd 135        Left Shoulder:  AROM: 135° Forward elevation,  90° ER,  IR to 35 (er/ir tested at 90* abd), abd 172         Joint/Motion:  Right Elbow:  AROM: 140° flexion (limited by tissue approximation) , extension lacks 5°     Left ELbow:  AROM: 150° flexion, extension 0°     Modalities            Manual                  Stretch      Table slides 10 x 5s holds  Flexion ; HEP te   Wall Flexion      Wall ER stretch      Towel IR stretch      IR reaching behind back      Exercise      Shrugs AROM      Pendulum Ex      UBE      Pulleys - flex X 10  2 foam pads     Pulleys-IR      Supine wand chest press X 10  Wedge and pillow    Supine wand flex X 10  Wedge and pillow    Supine wand ER/IR X 10  Wedge and pillow     Supine flexion      S-lying ABD      S-lying ER      Standing wand flex      Standing flexion      Standing ABD      Supine AAROM bicep curls and hammer curls  X 10 ea  Wedge and pillow    Seated pronation and supination  X 10     Scap retraction  X 10      Wrist flexion, ext, radial/ulnar deviation X 10 ea     Standing SB shoulder flexion on table X 10  Green ball     ROWS: H Functional activities  To aid in ROM and strength needed for reaching , lifting ,pushing and pulling at Attensity ROWS: M  \"    ROWS: L  \"    ER  \"    IR  \"                A:  Tolerated well. For supine exercises, pt used the wedge with a pillow. Requires cues for technique for table slides. Also required tactile/visual cues for scapular retraction. Pt denied pain at the end of treatment.    P: Continue with rehab plan    Ann Roca, SPT   Kevin Loyola, PT DPT, [place] [id number]    Treatment Charges: Mins Units   Initial Evaluation     Re-Evaluation     Ther Exercise         TE 26 2   Manual Therapy     MT     Ther Activities        TA     Gait Training          GT     Neuro Re-education NR     Modalities     Non-Billable Service Time     Other     Total Time/Units 26 2

## 2022-08-01 ENCOUNTER — TREATMENT (OUTPATIENT)
Dept: PHYSICAL THERAPY | Age: 87
End: 2022-08-01
Payer: MEDICARE

## 2022-08-01 DIAGNOSIS — S46.211D BICEPS TENDON RUPTURE, RIGHT, SUBSEQUENT ENCOUNTER: Primary | ICD-10-CM

## 2022-08-01 PROCEDURE — 97110 THERAPEUTIC EXERCISES: CPT | Performed by: PHYSICAL THERAPIST

## 2022-08-01 NOTE — PROGRESS NOTES
on table  2 X 10  Green ball     ROWS: H Functional activities  To aid in ROM and strength needed for reaching , lifting ,pushing and pulling at home/work    ROWS: M  \"    ROWS: L  \"    ER  \"    IR  \"                A:  Tolerated well. For supine exercises, pt used the wedge with a pillow. Pt progressed to AROM for bicep curls after doing one set of AAROM. Requires cues for technique for table slides but improving. Also required tactile/visual cues for scapular retraction. Wall flexion was added. Pt reported pain still at a 1/10 after treatment.    P: Continue with rehab plan    Jenna Smallwood, SPT   Neva Tyler, PT DPT, [address] [facility] [id number]    Treatment Charges: Mins Units   Initial Evaluation     Re-Evaluation     Ther Exercise         TE 28 2   Manual Therapy     MT     Ther Activities        TA     Gait Training          GT     Neuro Re-education NR     Modalities     Non-Billable Service Time     Other     Total Time/Units 28 2

## 2022-08-04 ENCOUNTER — TREATMENT (OUTPATIENT)
Dept: PHYSICAL THERAPY | Age: 87
End: 2022-08-04
Payer: MEDICARE

## 2022-08-04 DIAGNOSIS — S46.211D BICEPS TENDON RUPTURE, RIGHT, SUBSEQUENT ENCOUNTER: Primary | ICD-10-CM

## 2022-08-04 PROCEDURE — 97110 THERAPEUTIC EXERCISES: CPT | Performed by: PHYSICAL THERAPIST

## 2022-08-04 NOTE — PROGRESS NOTES
[facility] [facility]   Phone: [phone]   Fax: [phone]      Physical Therapy Daily Treatment Note    Date: 2022  Patient Name: Myrtle Gilliland  : 3/13/1932   MRN: [id number]  DOInjury: weeks  DOSx: NA   Referring Provider: No referring provider defined for this encounter. Medical Diagnosis:   S46.211D (ICD-10-CM) - Biceps tendon rupture, right, subsequent  encounter      Outcome Measure:  Jay Ephraim 29.55%    S: pt reports a 7/10 pain today that occurs with overhead movements but not at rest.  O: Pt given written HEP  Time 5992-5104     Visit  Repeat outcome measure at mid point and end.     Pain See above      ROM Right Shoulder:  AROM: 145° Forward elevation,  60° ER,  IR to 32*  (er/ir tested at 45* abd) abd 135        Left Shoulder:  AROM: 135° Forward elevation,  90° ER,  IR to 35 (er/ir tested at 90* abd), abd 172         Joint/Motion:  Right Elbow:  AROM: 140° flexion (limited by tissue approximation) , extension lacks 5°     Left ELbow:  AROM: 150° flexion, extension 0°     Modalities            Manual                  Stretch      Table slides 10 x 5s holds  Flexion ; HEP te   Wall Flexion  2 X 10      Wall ER stretch      Towel IR stretch      IR reaching behind back      Exercise      Shrugs AROM      Pendulum Ex      UBE      Pulleys - flex  2 X 10  2 foam pads     Pulleys-IR      Supine wand chest press  2 X 10  Wedge and pillow    Supine wand flex 2 X 10  Wedge and pillow    Supine wand ER/IR X 10  Wedge and pillow     Supine flexion      S-lying ABD      S-lying ER      Standing wand flex      Standing flexion      Standing ABD            Seated bicep curls  2 x 10      Seated pronation and supination  2 x 10  1#    Scap retraction  X 10      Wrist flexion, ext, radial/ulnar deviation 2 x 10 ea 1#    Standing SB shoulder flexion on table  2 X 10  Green ball     ROWS: H Functional activities  To aid in ROM and strength needed for reaching , lifting ,pushing and pulling at home/work    ROWS: M  \"    ROWS: L  \"    ER  \"    IR  \"    Ranson taps  2 x 10            A:  Tolerated well. Pt progressed to 2 sets of supine wand exercises. Bicep curls were done in the seated position today. Ranson taps were added and performed without any weight. Table slides were performed with better technique today. Scapular retraction still required a lot of cueing. Pt reported no pain at the end of treatment.      P: Continue with rehab plan    Ola Scheuermann, SPT   Lucio Green, PT DPT, [place] [id number]    Treatment Charges: Mins Units   Initial Evaluation     Re-Evaluation     Ther Exercise         TE 36 2   Manual Therapy     MT     Ther Activities        TA     Gait Training          GT     Neuro Re-education NR     Modalities     Non-Billable Service Time     Other     Total Time/Units 36 2

## 2022-08-08 ENCOUNTER — TREATMENT (OUTPATIENT)
Dept: PHYSICAL THERAPY | Age: 87
End: 2022-08-08
Payer: MEDICARE

## 2022-08-08 DIAGNOSIS — S46.211D BICEPS TENDON RUPTURE, RIGHT, SUBSEQUENT ENCOUNTER: Primary | ICD-10-CM

## 2022-08-08 PROCEDURE — 97110 THERAPEUTIC EXERCISES: CPT | Performed by: PHYSICAL THERAPIST

## 2022-08-08 NOTE — PROGRESS NOTES
[address] [facility]   Phone: [phone]   Fax: [phone]      Physical Therapy Daily Treatment Note    Date: 2022  Patient Name: Rogelio Hansen  : 3/13/1932   MRN: [id number]  DOInjury: weeks  DOSx: NA   Referring Provider: No referring provider defined for this encounter. Medical Diagnosis:   S46.211D (ICD-10-CM) - Biceps tendon rupture, right, subsequent  encounter      Outcome Measure:  Quickdash 29.55%    S: pt reports min pain today. O: Pt given written HEP  Time 7637-4552     Visit  Repeat outcome measure at mid point and end.     Pain See above      ROM Right Shoulder:  AROM: 145° Forward elevation,  60° ER,  IR to 32*  (er/ir tested at 45* abd) abd 135        Left Shoulder:  AROM: 135° Forward elevation,  90° ER,  IR to 35 (er/ir tested at 90* abd), abd 172         Joint/Motion:  Right Elbow:  AROM: 140° flexion (limited by tissue approximation) , extension lacks 5°     Left ELbow:  AROM: 150° flexion, extension 0°     Modalities            Manual                  Stretch      Table slides 10 x 5s holds  Flexion ; HEP te   Wall Flexion  2 X 10      Wall ER stretch      Towel IR stretch      IR reaching behind back      Exercise      Shrugs AROM      Pendulum Ex      UBE      Pulleys - flex  2 X 10  2 foam pads     Pulleys-IR      Supine wand chest press  2 X 10  Wedge and pillow    Supine wand flex 2 X 10  Wedge and pillow    Supine wand ER/IR X 10  Wedge and pillow     Supine flexion X 10     Seated shoulder flexion  2 x 5      Seated shoulder ABD 2 x 5     S-lying ABD      S-lying ER      Standing wand flex      Standing flexion      Standing ABD            Seated bicep curls  2 x 10      Seated hammer curls  2 x 10      Seated pronation and supination  2 x 10  2#    Scap retraction  X 10      Wrist flexion, ext, radial/ulnar deviation 2 x 10 ea 2#    Standing SB shoulder flexion/abduction on table  2 X 10 ea Green ball     ROWS: H Functional activities  To aid in ROM and strength needed for reaching , lifting ,pushing and pulling at home/work    ROWS: M  \"    ROWS: L  \"    ER  \"    IR  \"    Corbett taps  2 x 10  1 #          A:  Tolerated well. Pt progressed to 2 lb weight for wrist flex/ext and ulnar/radial deviation and supination/pronation. A 1 lb weight was added for tower taps. Hammer curls were added. Pt reported min pain at end of treatment.      P: Continue with rehab plan    Page Correia, JOSUE   Brain Kick, PT DPT, [place] [id number]    Treatment Charges: Mins Units   Initial Evaluation     Re-Evaluation     Ther Exercise         TE 36 2   Manual Therapy     MT     Ther Activities        TA     Gait Training          GT     Neuro Re-education NR     Modalities     Non-Billable Service Time     Other     Total Time/Units 36 2

## 2022-08-10 ENCOUNTER — OFFICE VISIT (OUTPATIENT)
Dept: FAMILY MEDICINE CLINIC | Age: 87
End: 2022-08-10
Payer: MEDICARE

## 2022-08-10 VITALS
HEIGHT: 60 IN | WEIGHT: 114 LBS | TEMPERATURE: 97.5 F | SYSTOLIC BLOOD PRESSURE: 120 MMHG | HEART RATE: 66 BPM | RESPIRATION RATE: 16 BRPM | BODY MASS INDEX: 22.38 KG/M2 | DIASTOLIC BLOOD PRESSURE: 78 MMHG | OXYGEN SATURATION: 97 %

## 2022-08-10 DIAGNOSIS — I10 ESSENTIAL HYPERTENSION: Primary | ICD-10-CM

## 2022-08-10 DIAGNOSIS — F01.50 VASCULAR DEMENTIA WITHOUT BEHAVIORAL DISTURBANCE (HCC): ICD-10-CM

## 2022-08-10 PROCEDURE — 1123F ACP DISCUSS/DSCN MKR DOCD: CPT | Performed by: FAMILY MEDICINE

## 2022-08-10 PROCEDURE — 99213 OFFICE O/P EST LOW 20 MIN: CPT | Performed by: FAMILY MEDICINE

## 2022-08-10 SDOH — ECONOMIC STABILITY: FOOD INSECURITY: WITHIN THE PAST 12 MONTHS, YOU WORRIED THAT YOUR FOOD WOULD RUN OUT BEFORE YOU GOT MONEY TO BUY MORE.: PATIENT DECLINED

## 2022-08-10 SDOH — ECONOMIC STABILITY: FOOD INSECURITY: WITHIN THE PAST 12 MONTHS, THE FOOD YOU BOUGHT JUST DIDN'T LAST AND YOU DIDN'T HAVE MONEY TO GET MORE.: PATIENT DECLINED

## 2022-08-10 ASSESSMENT — ENCOUNTER SYMPTOMS
COUGH: 0
CHEST TIGHTNESS: 0
BLOOD IN STOOL: 0
EYE REDNESS: 0
SHORTNESS OF BREATH: 0
DIARRHEA: 0
EYE PAIN: 0
EYE DISCHARGE: 0
ALLERGIC/IMMUNOLOGIC NEGATIVE: 1
TROUBLE SWALLOWING: 0
PHOTOPHOBIA: 0
BACK PAIN: 0
SINUS PAIN: 0
NAUSEA: 0
SORE THROAT: 0
ABDOMINAL PAIN: 0
VOMITING: 0

## 2022-08-10 ASSESSMENT — SOCIAL DETERMINANTS OF HEALTH (SDOH): HOW HARD IS IT FOR YOU TO PAY FOR THE VERY BASICS LIKE FOOD, HOUSING, MEDICAL CARE, AND HEATING?: PATIENT DECLINED

## 2022-08-10 NOTE — PROGRESS NOTES
8/10/22  Privaleri [place] : 3/13/1932 Sex: female  Age: 80 y.o. Assessment and Plan:  [place] was seen today for arm pain and hypertension. Diagnoses and all orders for this visit:    Essential hypertension    Vascular dementia without behavioral disturbance ([address]    Is to keep close eye on behavior. May need to add an agent such as Remeron or Seroquel to her evening dose if this mitigates her confusional episodes. Return in about 3 months (around 11/10/2022). Chief Complaint   Patient presents with    Arm Pain     Right torn bicep    Hypertension       The patient is here for blood pressure check. Patient has been taking their medications as prescribed. No recent changes to their medications. No headache or visual disturbances. No dizziness or tinnitus. No chest pain, palpitations, or dyspnea. No nausea and vomiting. No numbness, tingling and or weakness to the extremities. No fevers or chills. No recent illness. She tore her biceps in the right arm since last visit. Slowly healing with rest and physical therapy. Cording to the son, she is getting confused episodes, usually once weekly. Taking the Vistaril now and then before bedtime. Review of Systems   Constitutional:  Negative for appetite change, fatigue and unexpected weight change. HENT:  Negative for congestion, ear pain, hearing loss, sinus pain, sore throat and trouble swallowing. Eyes:  Negative for photophobia, pain, discharge and redness. Respiratory:  Negative for cough, chest tightness and shortness of breath. Cardiovascular:  Negative for chest pain, palpitations and leg swelling. Gastrointestinal:  Negative for abdominal pain, blood in stool, diarrhea, nausea and vomiting. Endocrine: Negative. Genitourinary:  Negative for dysuria, flank pain, frequency and hematuria. Musculoskeletal:  Negative for arthralgias, back pain, joint swelling and myalgias. Skin: Negative.     Allergic/Immunologic: Negative. Neurological:  Negative for dizziness, seizures, syncope, weakness, light-headedness, numbness and headaches. Hematological:  Negative for adenopathy. Does not bruise/bleed easily. Psychiatric/Behavioral:  Positive for confusion and hallucinations. Current Outpatient Medications:     acetaminophen (TYLENOL) 325 MG tablet, Take 650 mg by mouth every 6 hours as needed for Pain, Disp: , Rfl:     memantine (NAMENDA) 10 MG tablet, Take 1 tablet by mouth daily, Disp: 90 tablet, Rfl: 1    lisinopril (PRINIVIL;ZESTRIL) 20 MG tablet, Take 1 tablet by mouth daily, Disp: 90 tablet, Rfl: 1    hydrOXYzine (ATARAX) 25 MG tablet, TAKE 1 OR 2 TABLETS BY MOUTH NIGHTLY, Disp: 180 tablet, Rfl: 1    vitamin D (CHOLECALCIFEROL) 1000 UNIT TABS tablet, Take 1,000 Units by mouth daily , Disp: , Rfl:     aspirin 81 MG tablet, Take by mouth, Disp: , Rfl:     Vitamin E 100 UNITS TABS, Take 1 capsule by mouth daily. , Disp: , Rfl:     Multiple Vitamins-Minerals (THERAPEUTIC MULTIVITAMIN-MINERALS) tablet, Take 1 tablet by mouth daily. , Disp: , Rfl:   Allergies   Allergen Reactions    Biaxin [Clarithromycin] Other (See Comments)     Many years ago, does not recall reaction    Pcn [Penicillins] Rash       Past Medical History:   Diagnosis Date    Dementia ([address]     Epiretinal membrane     Hard of hearing     Hyperlipidemia     Hypertension     Macular degeneration     Osteoarthritis     Vitamin D deficiency      Past Surgical History:   Procedure Laterality Date    CATARACT REMOVAL WITH IMPLANT Bilateral     COLONOSCOPY  08/09/2010    COLPOSCOPY      DILATION AND CURETTAGE OF UTERUS      HEMORRHOID SURGERY  years ago    REPAIR RETINAL TEAR/HOLE Left     TONSILLECTOMY AND ADENOIDECTOMY      VITRECTOMY Left 03/11/2015     Family History   Problem Relation Age of Onset    Colon Cancer Mother     Hypertension Mother     Lung Cancer Father         Black Lung    Breast Cancer Sister     Lung Cancer Brother     Other Other Rhythm: Normal rate and regular rhythm. Heart sounds: No murmur heard. No friction rub. No gallop. Pulmonary:      Effort: Pulmonary effort is normal. No respiratory distress. Breath sounds: Normal breath sounds. Abdominal:      General: Bowel sounds are normal.      Palpations: Abdomen is soft. Musculoskeletal:         General: Tenderness present. No deformity. Normal range of motion. Cervical back: Normal range of motion and neck supple. Comments: Right arm   Lymphadenopathy:      Cervical: No cervical adenopathy. Skin:     General: Skin is warm and dry. Capillary Refill: Capillary refill takes less than 2 seconds. Findings: No rash. Neurological:      Mental Status: She is alert and oriented to person, place, and time. Sensory: No sensory deficit. Motor: No abnormal muscle tone.       Coordination: Coordination normal.      Deep Tendon Reflexes: Reflexes normal.           Seen By:  Sommer Blanchard DO

## 2022-08-11 ENCOUNTER — TREATMENT (OUTPATIENT)
Dept: PHYSICAL THERAPY | Age: 87
End: 2022-08-11
Payer: MEDICARE

## 2022-08-11 DIAGNOSIS — S46.211D BICEPS TENDON RUPTURE, RIGHT, SUBSEQUENT ENCOUNTER: Primary | ICD-10-CM

## 2022-08-11 PROCEDURE — 97530 THERAPEUTIC ACTIVITIES: CPT | Performed by: PHYSICAL THERAPIST

## 2022-08-11 PROCEDURE — 97110 THERAPEUTIC EXERCISES: CPT | Performed by: PHYSICAL THERAPIST

## 2022-08-11 NOTE — PROGRESS NOTES
[address] [facility]   Phone: [phone]   Fax: [phone]      Physical Therapy Daily Treatment Note    Date: 2022  Patient Name: [place]  : 3/13/1932   MRN: [id number]  DOInjury: weeks  DOSx: NA   Referring Provider: Alphonse Sims PA-C  [address] [facility],  [address] [place] [address] Diagnosis:   L81.747Y (ICD-10-CM) - Biceps tendon rupture, right, subsequent  encounter      Outcome Measure:  Nany Benoit 29.55%    S: pt reports no pain today. O: Pt given written HEP  Time 5934-1717     Visit  Repeat outcome measure at mid point and end.     Pain See above      ROM Right Shoulder:  AROM: 145° Forward elevation,  60° ER,  IR to 32*  (er/ir tested at 45* abd) abd 135        Left Shoulder:  AROM: 135° Forward elevation,  90° ER,  IR to 35 (er/ir tested at 90* abd), abd 172         Joint/Motion:  Right Elbow:  AROM: 140° flexion (limited by tissue approximation) , extension lacks 5°     Left ELbow:  AROM: 150° flexion, extension 0°     Modalities            Manual                  Stretch      Table slides 10 x 5s holds  Flexion ; HEP te   Wall Flexion  2 X 10      Wall ER stretch      Towel IR stretch      IR reaching behind back      Exercise      Shrugs AROM      Pendulum Ex      UBE      Pulleys - flex  2 X 10  2 foam pads     Pulleys-IR      Supine wand chest press  2 X 10  Wedge and pillow    Supine wand flex 2 X 10  Wedge and pillow    Supine wand ER/IR X 10  Wedge and pillow     Supine flexion X 10     Seated shoulder flexion  1 x 10      Seated shoulder ABD 1 x 10      S-lying ABD      S-lying ER      Standing wand flex      Standing flexion      Standing ABD            Seated bicep curls  2 x 10  1#    Seated hammer curls  2 x 10  1#    Seated pronation and supination  2 x 10  2#    Scap retraction  X 10      Wrist flexion, ext, radial/ulnar deviation 2 x 10 ea 2#    Standing SB shoulder flexion/abduction on table  2 X 10 ea Green ball ROWS: H Functional activities  To aid in ROM and strength needed for reaching , lifting ,pushing and pulling at home/work    ROWS: M 2 x 10  RTB    ROWS: L  \"    ER  \"    IR  \"    [place] taps  2 x 10  1 #          A:  Tolerated well. Pt progressed to a 1 lb weight for bicep/hammer curls. Rows were added. Cueing was still needed for scapular retraction. Pt reported no pain at the end of treatment today.      P: Continue with rehab plan    Elvia Juares, JOSUE Calvin, PT DPT, [place] [id number]    Treatment Charges: Mins Units   Initial Evaluation     Re-Evaluation     Ther Exercise         TE 30 2   Manual Therapy     MT     Ther Activities        TA 8 1   Gait Training          GT     Neuro Re-education NR     Modalities     Non-Billable Service Time     Other     Total Time/Units 38 3

## 2022-08-14 NOTE — PROGRESS NOTES
[facility] [facility]   Phone: [phone]   Fax: [phone]        Referring Provider: Ximena Harmon PA-C  [address]       Medical Diagnosis:   G46.812N (ICD-10-CM) - Biceps tendon rupture, right, subsequent  encounter      [facility]  [facility]2022. ATTENDANCE:  Patient has attended 7 of 7 scheduled treatments from 7/25/2022  to 8/15/2022 . TREATMENTS RECEIVED:  therapeutic exercise, therapeutic activity     INITIAL STATUS:  Observations: well nourished female     Inspection: demonstrates generalized pronated posture (forward head, protracted scapula, internally rotated shoulders, and flexed trunk and lower extremities).                                                     Palpation: Tender to palpation at anterior arm from shoulder to elbow       Joint/Motion:  Right Shoulder:  AROM: 145° Forward elevation,  60° ER,  IR to 32*  (er/ir tested at 45* abd) abd 135        Left Shoulder:  AROM: 135° Forward elevation,  90° ER,  IR to 35 (er/ir tested at 90* abd), abd 172         Joint/Motion:  Right Elbow:  AROM: 140° flexion (limited by tissue approximation) , extension lacks 5°     Left ELbow:  AROM: 150° flexion, extension 0°        Strength:  Right Shoulder: Flexion 3/5,  Abduction 3/5, ER 3/5, IR 3/5       Left Shoulder: Flexion 4+/5,  Abduction 4/5, ER 4/5, IR 4/5      Strength:  Right Elbow:Flexion 3+/5,  Extension 4-/5        Left Elbow:Flexion 4+ /5,  Extension 4/5           Special Tests/Functional Screens:    N/T   [] Sol-Noe []+ / [] -  [] Broadway's []+ / [] -  [] AC Sheer []+ / [] -    [] GH drawer []+ / [] -    [] Bicep Load []+ / [] -  [] Crank []+ / [] -  [] Te Hind []+ / [] -  [] Ferdinand Parson []+ / [] - [] Necyrus's []+ / [] -      [] Speed's []+ / [] -  [] Radha's []+ / [] -    [] Sulcus Sign []+ / [] -  [] Apprehension []+ / [] -  [] Bicep Load II []+ / [] -  [] Elbow Valgus []+ / [] -     [] Elbow Varus []+ weeks)  Decrease reported pain to 2/10 (goal met)   Increase ROM of R arm to 150 shoulder elevation, 70 ER, 40 IR, abd 145; Elbow flexion to 145 and extension to 0 (goal met)   Increase Strength to R elbow flexion 4-/5 and extension 4/5 (goal met)   Able to perform/complete the following functions/tasks: able to reach over head 10 times with mild pain, able to dress/wash self with mild pain/limitation limitation in mobility, able to use a knife to cut food with mild difficulty (goal met)  QuickDASH (Disorders of the Arm, Shoulder, and Hand) 25% disability (goal met)      Long Term goals (6 weeks)  Decrease reported pain to 0/10 (goal not met)   Increase ROM of R arm to 160 shoulder elevation, 80 ER, 45 IR, abd 150; Elbow flexion to 150 and extension to 0 (goal partially met)  Increase Strength to R elbow flexion 4+/5 and extension 4+/5 (goal partially met)   Able to perform/complete the following functions/tasks: able to reach over head 10 times with no pain to wash windows, able to dress/wash self with no pain/limitation in mobility, able to use a knife to prepare a meal with no pain/limitation (goal partially met)   QuickDASH 20% disability (not met)   Independent with Home Exercise Programs    OUTCOME MEASURE:  Fernandez Galeas 22.73%    COMMENTS AND RECOMMENDATIONS:   Pt made good progress with PT. Pt reports very little pain with it not being any greater than her normal daily pain. Pt was able to reach 10 x OH with some discomfort but no pain. Pt reports being able to dress/wash with not much difficulty on most days, however, some trouble on some days. Pt reports being able to use a knife with no pain/difficulty. Pt does not wash windows anymore due to safety reasons, however, reports being able to be on the floor to scrub bathroom and sweep the floor. Pt reports exercise routine every morning with walking to the park every day. No more therapy appointments recommended at this time; pt in agreement.   Pt was instructed

## 2022-08-15 ENCOUNTER — TREATMENT (OUTPATIENT)
Dept: PHYSICAL THERAPY | Age: 87
End: 2022-08-15
Payer: MEDICARE

## 2022-08-15 DIAGNOSIS — S46.211D BICEPS TENDON RUPTURE, RIGHT, SUBSEQUENT ENCOUNTER: Primary | ICD-10-CM

## 2022-08-15 PROCEDURE — 97164 PT RE-EVAL EST PLAN CARE: CPT | Performed by: PHYSICAL THERAPIST

## 2022-08-15 NOTE — PROGRESS NOTES
[address] [facility]   Phone: [phone]   Fax: [phone]      Physical Therapy Daily Treatment Note    Date: 8/15/2022  Patient Name: [place]  : 3/13/1932   MRN: [id number]  DOInjury: weeks  DOSx: NA   Referring Provider: Vinnie Whiet PA-C  [address] [facility],  [facility] Diagnosis:   C36.176L (ICD-10-CM) - Biceps tendon rupture, right, subsequent  encounter      Outcome Measure:  Mayra Moya 22.73%    S: See reassessment note. O: Pt given written HEP  Time 4520-9279     Visit  Repeat outcome measure at mid point and end.     Pain See above      ROM Joint/Motion:  Right Shoulder:  AROM: 148° Forward elevation,  42° ER,  IR to 75*  (er/ir tested at 45* abd) abd 132        Left Shoulder:  AROM: 150° Forward elevation,  90° ER,  IR to 35 (er/ir tested at 90* abd), abd 160         Joint/Motion:  Right Elbow:  AROM: 145° flexion (limited by tissue approximation) , extension 0°     Left ELbow:  AROM: 155° flexion, extension 0°     Modalities            Manual                  Stretch      Table slides Flexion ; HEP te   Wall Flexion     Wall ER stretch      Towel IR stretch      IR reaching behind back      Exercise      Shrugs AROM      Pendulum Ex      UBE     Pulleys - flex 2 foam pads     Pulleys-IR     Supine wand chest press Wedge and pillow    Supine wand flex Wedge and pillow    Supine wand ER/IR Wedge and pillow     Supine flexion     Seated shoulder flexion      Seated shoulder ABD     S-lying ABD     S-lying ER     Standing wand flex     Standing flexion     Standing ABD           Seated bicep curls  1#    Seated hammer curls  1#    Seated pronation and supination  2#    Scap retraction      Wrist flexion, ext, radial/ulnar deviation 2#    Standing SB shoulder flexion/abduction on table Green ball     ROWS: H  To aid in ROM and strength needed for reaching , lifting ,pushing and pulling at home/work    ROWS: M RTB    ROWS: L \" ER \"    IR \"    [place] taps  1 #          A:  Tolerated well. Reassessment completed today. See note for details. P: Will discharge pt at this time.      Jj Ortiz, SPT   [facility], [place] [id number]    Treatment Charges: Mins Units   Initial Evaluation     Re-Evaluation 25 1   Ther Exercise         TE     Manual Therapy     MT     Ther Activities        TA     Gait Training          GT     Neuro Re-education NR     Modalities     Non-Billable Service Time     Other     Total Time/Units 25 1

## 2022-09-15 ENCOUNTER — OFFICE VISIT (OUTPATIENT)
Dept: ORTHOPEDIC SURGERY | Age: 87
End: 2022-09-15
Payer: MEDICARE

## 2022-09-15 DIAGNOSIS — M19.011 PRIMARY OSTEOARTHRITIS OF RIGHT SHOULDER: ICD-10-CM

## 2022-09-15 DIAGNOSIS — S46.211A BICEPS TENDON RUPTURE, RIGHT, INITIAL ENCOUNTER: Primary | ICD-10-CM

## 2022-09-15 DIAGNOSIS — M25.411 EFFUSION OF RIGHT SHOULDER: ICD-10-CM

## 2022-09-15 PROCEDURE — 1123F ACP DISCUSS/DSCN MKR DOCD: CPT | Performed by: NURSE PRACTITIONER

## 2022-09-15 PROCEDURE — 99213 OFFICE O/P EST LOW 20 MIN: CPT | Performed by: NURSE PRACTITIONER

## 2022-09-15 NOTE — PROGRESS NOTES
Follow Up Visit     Mariya Lynn returns today for follow up visit regarding right shoulder pain, long head biceps tendon rupture. Treatment has included outpatient physical therapy and OTC medications including acetaminophen. She reports symptoms are improved. REVIEW OF SYSTEMS:     General: Negative Fever, chills, fatigue   Cardiovascular: Negative chest pain, palpitations  Respiratory: Equal chest expansion, negative shortness of breath   Skin: Negative rash, open wounds  Psych: Normal affect, mood stable  Neurologic: sensation grossly intact in extremities   Musculoskeletal: See HPI      Physical Exam:     No data recorded    General: Alert and oriented x3, no acute distress  Cardiovascular/pulmonary: No labored breathing, peripheral perfusion intact  Musculoskeletal:    Right shoulder exam mild swelling with mild palpable effusion present. No signs or symptoms of infection. Positive Sudeep deformity on inspection. Range of motion 150/50/sacrum. Positive belly press. Mild pain without weakness on Jobes and speeds. Joint nontender to palpation. Controlled Substances Monitoring:      Imaging:  No new imaging obtained today. Previous imaging reviewed      Assessment: Right shoulder osteoarthritis, long head biceps tendon rupture, history of recurrent shoulder effusions      Plan: Today we discussed her right shoulder. She reports after completing physical therapy symptoms have greatly improved. On exam she has good range of motion without presence of weakness. There is mild swelling and effusion present today. Advised against recurrent aspirations and patient was agreeable. She will continue with OTC medications as needed for symptom relief. She will follow-up as needed.       Laura Mario, CHERYL-CNP  Orthopaedic Surgery   9/15/22  4:12 PM

## 2022-10-21 DIAGNOSIS — F01.50 VASCULAR DEMENTIA WITHOUT BEHAVIORAL DISTURBANCE (HCC): ICD-10-CM

## 2022-10-21 RX ORDER — MEMANTINE HYDROCHLORIDE 10 MG/1
10 TABLET ORAL DAILY
Qty: 90 TABLET | Refills: 1 | Status: SHIPPED | OUTPATIENT
Start: 2022-10-21

## 2022-10-21 NOTE — TELEPHONE ENCOUNTER
Last Appointment:  8/10/2022  Future Appointments   Date Time Provider Jonatan Lopez   11/10/2022  7:30 AM [place] Reji Crawford,  [address] [place] [address]

## 2022-10-31 DIAGNOSIS — I10 ESSENTIAL HYPERTENSION: ICD-10-CM

## 2022-10-31 RX ORDER — LISINOPRIL 20 MG/1
20 TABLET ORAL DAILY
Qty: 90 TABLET | Refills: 0 | Status: SHIPPED
Start: 2022-10-31 | End: 2022-11-10 | Stop reason: SDUPTHER

## 2022-11-10 ENCOUNTER — OFFICE VISIT (OUTPATIENT)
Dept: FAMILY MEDICINE CLINIC | Age: 87
End: 2022-11-10
Payer: MEDICARE

## 2022-11-10 VITALS
TEMPERATURE: 98 F | HEIGHT: 60 IN | BODY MASS INDEX: 21.79 KG/M2 | WEIGHT: 111 LBS | RESPIRATION RATE: 16 BRPM | HEART RATE: 78 BPM | OXYGEN SATURATION: 98 % | DIASTOLIC BLOOD PRESSURE: 62 MMHG | SYSTOLIC BLOOD PRESSURE: 110 MMHG

## 2022-11-10 DIAGNOSIS — F01.50 VASCULAR DEMENTIA WITHOUT BEHAVIORAL DISTURBANCE (HCC): ICD-10-CM

## 2022-11-10 DIAGNOSIS — F06.4 ANXIETY DISORDER DUE TO GENERAL MEDICAL CONDITION: ICD-10-CM

## 2022-11-10 DIAGNOSIS — M19.011 PRIMARY OSTEOARTHRITIS OF RIGHT SHOULDER: ICD-10-CM

## 2022-11-10 DIAGNOSIS — I10 ESSENTIAL HYPERTENSION: ICD-10-CM

## 2022-11-10 DIAGNOSIS — Z23 NEED FOR VACCINATION: Primary | ICD-10-CM

## 2022-11-10 PROCEDURE — 90694 VACC AIIV4 NO PRSRV 0.5ML IM: CPT | Performed by: FAMILY MEDICINE

## 2022-11-10 PROCEDURE — 1123F ACP DISCUSS/DSCN MKR DOCD: CPT | Performed by: FAMILY MEDICINE

## 2022-11-10 PROCEDURE — 99213 OFFICE O/P EST LOW 20 MIN: CPT | Performed by: FAMILY MEDICINE

## 2022-11-10 PROCEDURE — G0008 ADMIN INFLUENZA VIRUS VAC: HCPCS | Performed by: FAMILY MEDICINE

## 2022-11-10 RX ORDER — HYDROXYZINE HYDROCHLORIDE 25 MG/1
TABLET, FILM COATED ORAL
Qty: 180 TABLET | Refills: 1 | Status: SHIPPED | OUTPATIENT
Start: 2022-11-10

## 2022-11-10 RX ORDER — LISINOPRIL 20 MG/1
20 TABLET ORAL DAILY
Qty: 90 TABLET | Refills: 0 | Status: SHIPPED | OUTPATIENT
Start: 2022-11-10

## 2022-11-10 RX ORDER — MEMANTINE HYDROCHLORIDE 10 MG/1
10 TABLET ORAL DAILY
Qty: 90 TABLET | Refills: 1 | Status: SHIPPED | OUTPATIENT
Start: 2022-11-10

## 2022-11-10 ASSESSMENT — ENCOUNTER SYMPTOMS
EYE PAIN: 0
ALLERGIC/IMMUNOLOGIC NEGATIVE: 1
CHEST TIGHTNESS: 0
EYE REDNESS: 0
SORE THROAT: 0
SHORTNESS OF BREATH: 0
NAUSEA: 0
BLOOD IN STOOL: 0
TROUBLE SWALLOWING: 0
ABDOMINAL PAIN: 0
BACK PAIN: 0
VOMITING: 0
EYE DISCHARGE: 0
SINUS PAIN: 0
DIARRHEA: 0
COUGH: 0
PHOTOPHOBIA: 0

## 2022-11-10 NOTE — PROGRESS NOTES
11/10/22  Tom Dutton : 3/13/1932 Sex: female  Age: 80 y.o. Assessment and Plan:  [place] was seen today for hypertension. Diagnoses and all orders for this visit:    Need for vaccination  -     Influenza, FLUAD, (age 72 y+), IM, Preservative Free, 0.5 mL    Anxiety disorder due to general medical condition  -     hydrOXYzine HCl (ATARAX) 25 MG tablet; TAKE 1 OR 2 TABLETS BY MOUTH NIGHTLY    Essential hypertension  -     lisinopril (PRINIVIL;ZESTRIL) 20 MG tablet; Take 1 tablet by mouth daily    Vascular dementia without behavioral disturbance (HCC)  -     memantine (NAMENDA) 10 MG tablet; Take 1 tablet by mouth daily    Primary osteoarthritis of right shoulder  Orthopedics following this complaint. Able at this time. Return in about 3 months (around 2/10/2023), or Fasting blood work. Chief Complaint   Patient presents with    Hypertension       Here for recheck, refill present prescriptions. Her dementia is stable. Has no behavioral disturbances at this time. Her son states that she has been unchanged since her last visit. Shoulder continues problematic at times. She had been raking leaves recently and it was stiff and sore for a couple days after that. Small effusion present but otherwise range of motion is intact. Review of Systems   Constitutional:  Negative for appetite change, fatigue and unexpected weight change. HENT:  Negative for congestion, ear pain, hearing loss, sinus pain, sore throat and trouble swallowing. Eyes:  Negative for photophobia, pain, discharge and redness. Respiratory:  Negative for cough, chest tightness and shortness of breath. Cardiovascular:  Negative for chest pain, palpitations and leg swelling. Gastrointestinal:  Negative for abdominal pain, blood in stool, diarrhea, nausea and vomiting. Endocrine: Negative. Genitourinary:  Negative for dysuria, flank pain, frequency and hematuria.    Musculoskeletal:  Positive for arthralgias and joint swelling. Negative for back pain and myalgias. Right shoulder   Skin: Negative. Allergic/Immunologic: Negative. Neurological:  Negative for dizziness, seizures, syncope, weakness, light-headedness, numbness and headaches. Hematological:  Negative for adenopathy. Does not bruise/bleed easily. Psychiatric/Behavioral:  Positive for confusion. Current Outpatient Medications:     hydrOXYzine HCl (ATARAX) 25 MG tablet, TAKE 1 OR 2 TABLETS BY MOUTH NIGHTLY, Disp: 180 tablet, Rfl: 1    lisinopril (PRINIVIL;ZESTRIL) 20 MG tablet, Take 1 tablet by mouth daily, Disp: 90 tablet, Rfl: 0    memantine (NAMENDA) 10 MG tablet, Take 1 tablet by mouth daily, Disp: 90 tablet, Rfl: 1    acetaminophen (TYLENOL) 325 MG tablet, Take 650 mg by mouth every 6 hours as needed for Pain, Disp: , Rfl:     vitamin D (CHOLECALCIFEROL) 1000 UNIT TABS tablet, Take 1,000 Units by mouth daily , Disp: , Rfl:     aspirin 81 MG tablet, Take by mouth, Disp: , Rfl:     Vitamin E 100 UNITS TABS, Take 1 capsule by mouth daily. , Disp: , Rfl:     Multiple Vitamins-Minerals (THERAPEUTIC MULTIVITAMIN-MINERALS) tablet, Take 1 tablet by mouth daily. , Disp: , Rfl:   Allergies   Allergen Reactions    Biaxin [Clarithromycin] Other (See Comments)     Many years ago, does not recall reaction    Pcn [Penicillins] Rash       Past Medical History:   Diagnosis Date    Dementia ([facility] [address]     Epiretinal membrane     Hard of hearing     Hyperlipidemia     Hypertension     Macular degeneration     Osteoarthritis     Vitamin D deficiency      Past Surgical History:   Procedure Laterality Date    CATARACT REMOVAL WITH IMPLANT Bilateral     COLONOSCOPY  08/09/2010    COLPOSCOPY      DILATION AND CURETTAGE OF UTERUS      HEMORRHOID SURGERY  years ago    REPAIR RETINAL TEAR/HOLE Left     TONSILLECTOMY AND ADENOIDECTOMY      VITRECTOMY Left 03/11/2015     Family History   Problem Relation Age of Onset    Colon Cancer Mother     Hypertension Mother Lung Cancer Father         Black Lung    Breast Cancer Sister     Lung Cancer Brother     Other Other         6 siblings, most  of infirmities of old age    No Known Problems Sister     No Known Problems Sister     No Known Problems Brother     No Known Problems Brother      Social History     Socioeconomic History    Marital status:      Spouse name: Not on file    Number of children: Not on file    Years of education: Not on file    Highest education level: Not on file   Occupational History    Occupation: retired bakery   Tobacco Use    Smoking status: Never    Smokeless tobacco: Never   Substance and Sexual Activity    Alcohol use: No    Drug use: No    Sexual activity: Not on file   Other Topics Concern    Not on file   Social History Narrative    Not on file     Social Determinants of Health     Financial Resource Strain: Unknown    Difficulty of Paying Living Expenses: Patient refused   Food Insecurity: Unknown    Worried About Running Out of Food in the Last Year: Patient refused    Ran Out of Food in the Last Year: Patient refused   Transportation Needs: Not on file   Physical Activity: Insufficiently Active    Days of Exercise per Week: 4 days    Minutes of Exercise per Session: 30 min   Stress: Not on file   Social Connections: Not on file   Intimate Partner Violence: Not on file   Housing Stability: Not on file       Vitals:    11/10/22 0739   BP: 110/62   Pulse: 78   Resp: 16   Temp: 98 °F (36.7 °C)   TempSrc: Temporal   SpO2: 98%   Weight: 111 lb (50.3 kg)   Height: 5' (1.524 m)       Physical Exam  Vitals and nursing note reviewed. Constitutional:       Appearance: She is well-developed. HENT:      Head: Atraumatic. Right Ear: External ear normal.      Left Ear: External ear normal.      Nose: Nose normal.      Mouth/Throat:      Pharynx: No oropharyngeal exudate. Eyes:      Conjunctiva/sclera: Conjunctivae normal.      Pupils: Pupils are equal, round, and reactive to light. Neck:      Thyroid: No thyromegaly. Trachea: No tracheal deviation. Cardiovascular:      Rate and Rhythm: Normal rate and regular rhythm. Heart sounds: No murmur heard. No friction rub. No gallop. Pulmonary:      Effort: Pulmonary effort is normal. No respiratory distress. Breath sounds: Normal breath sounds. Abdominal:      General: Bowel sounds are normal.      Palpations: Abdomen is soft. Musculoskeletal:         General: No tenderness or deformity. Normal range of motion. Cervical back: Normal range of motion and neck supple. Comments: Mild effusion noted right shoulder, joint is not red or warm. There is intact range of motion present, good color, pulses, sensation, range of motion of the right arm. Lymphadenopathy:      Cervical: No cervical adenopathy. Skin:     General: Skin is warm and dry. Capillary Refill: Capillary refill takes less than 2 seconds. Findings: No rash. Neurological:      Mental Status: She is alert and oriented to person, place, and time. Sensory: No sensory deficit. Motor: No abnormal muscle tone.       Coordination: Coordination normal.      Deep Tendon Reflexes: Reflexes normal.           Seen By:  Shan Moya DO

## 2023-02-10 ENCOUNTER — OFFICE VISIT (OUTPATIENT)
Dept: FAMILY MEDICINE CLINIC | Age: 88
End: 2023-02-10
Payer: MEDICARE

## 2023-02-10 VITALS
TEMPERATURE: 97.7 F | BODY MASS INDEX: 22.38 KG/M2 | SYSTOLIC BLOOD PRESSURE: 130 MMHG | HEART RATE: 68 BPM | HEIGHT: 60 IN | OXYGEN SATURATION: 94 % | WEIGHT: 114 LBS | DIASTOLIC BLOOD PRESSURE: 72 MMHG | RESPIRATION RATE: 16 BRPM

## 2023-02-10 DIAGNOSIS — I10 ESSENTIAL HYPERTENSION: ICD-10-CM

## 2023-02-10 DIAGNOSIS — E78.2 MIXED HYPERLIPIDEMIA: ICD-10-CM

## 2023-02-10 DIAGNOSIS — F06.4 ANXIETY DISORDER DUE TO GENERAL MEDICAL CONDITION: ICD-10-CM

## 2023-02-10 DIAGNOSIS — E55.9 VITAMIN D DEFICIENCY: ICD-10-CM

## 2023-02-10 DIAGNOSIS — M19.011 PRIMARY OSTEOARTHRITIS OF RIGHT SHOULDER: Primary | ICD-10-CM

## 2023-02-10 DIAGNOSIS — F01.50 VASCULAR DEMENTIA WITHOUT BEHAVIORAL DISTURBANCE (HCC): ICD-10-CM

## 2023-02-10 PROCEDURE — 1123F ACP DISCUSS/DSCN MKR DOCD: CPT | Performed by: FAMILY MEDICINE

## 2023-02-10 PROCEDURE — 99214 OFFICE O/P EST MOD 30 MIN: CPT | Performed by: FAMILY MEDICINE

## 2023-02-10 RX ORDER — LISINOPRIL 20 MG/1
20 TABLET ORAL DAILY
Qty: 90 TABLET | Refills: 1 | Status: SHIPPED | OUTPATIENT
Start: 2023-02-10

## 2023-02-10 RX ORDER — MEMANTINE HYDROCHLORIDE 10 MG/1
10 TABLET ORAL DAILY
Qty: 90 TABLET | Refills: 1 | Status: SHIPPED | OUTPATIENT
Start: 2023-02-10

## 2023-02-10 RX ORDER — HYDROXYZINE HYDROCHLORIDE 25 MG/1
TABLET, FILM COATED ORAL
Qty: 180 TABLET | Refills: 1 | Status: SHIPPED | OUTPATIENT
Start: 2023-02-10

## 2023-02-10 SDOH — ECONOMIC STABILITY: FOOD INSECURITY: WITHIN THE PAST 12 MONTHS, THE FOOD YOU BOUGHT JUST DIDN'T LAST AND YOU DIDN'T HAVE MONEY TO GET MORE.: NEVER TRUE

## 2023-02-10 SDOH — ECONOMIC STABILITY: HOUSING INSECURITY
IN THE LAST 12 MONTHS, WAS THERE A TIME WHEN YOU DID NOT HAVE A STEADY PLACE TO SLEEP OR SLEPT IN A SHELTER (INCLUDING NOW)?: NO

## 2023-02-10 SDOH — ECONOMIC STABILITY: FOOD INSECURITY: WITHIN THE PAST 12 MONTHS, YOU WORRIED THAT YOUR FOOD WOULD RUN OUT BEFORE YOU GOT MONEY TO BUY MORE.: NEVER TRUE

## 2023-02-10 SDOH — ECONOMIC STABILITY: INCOME INSECURITY: HOW HARD IS IT FOR YOU TO PAY FOR THE VERY BASICS LIKE FOOD, HOUSING, MEDICAL CARE, AND HEATING?: NOT HARD AT ALL

## 2023-02-10 ASSESSMENT — ENCOUNTER SYMPTOMS
TROUBLE SWALLOWING: 0
SINUS PAIN: 0
DIARRHEA: 0
BLOOD IN STOOL: 0
EYE REDNESS: 0
VOMITING: 0
NAUSEA: 0
SHORTNESS OF BREATH: 0
EYE DISCHARGE: 0
COUGH: 0
ALLERGIC/IMMUNOLOGIC NEGATIVE: 1
ABDOMINAL PAIN: 0
BACK PAIN: 0
CHEST TIGHTNESS: 0
EYE PAIN: 0
PHOTOPHOBIA: 0
SORE THROAT: 0

## 2023-02-10 ASSESSMENT — PATIENT HEALTH QUESTIONNAIRE - PHQ9
1. LITTLE INTEREST OR PLEASURE IN DOING THINGS: 0
2. FEELING DOWN, DEPRESSED OR HOPELESS: 0
SUM OF ALL RESPONSES TO PHQ QUESTIONS 1-9: 0
SUM OF ALL RESPONSES TO PHQ QUESTIONS 1-9: 0
SUM OF ALL RESPONSES TO PHQ9 QUESTIONS 1 & 2: 0
SUM OF ALL RESPONSES TO PHQ QUESTIONS 1-9: 0
SUM OF ALL RESPONSES TO PHQ QUESTIONS 1-9: 0

## 2023-02-10 NOTE — PROGRESS NOTES
2/10/23  Joslyn Veronica : 3/13/1932 Sex: female  Age: 80 y.o. Assessment and Plan:  [place] was seen today for hypertension, shoulder pain and anxiety. Diagnoses and all orders for this visit:    Primary osteoarthritis of right shoulder  -     CBC with Auto Differential; Future  -     Comprehensive Metabolic Panel, Fasting; Future    Vascular dementia without behavioral disturbance (HCC)  -     memantine (NAMENDA) 10 MG tablet; Take 1 tablet by mouth daily  -     CBC with Auto Differential; Future  -     Comprehensive Metabolic Panel, Fasting; Future    Essential hypertension  -     lisinopril (PRINIVIL;ZESTRIL) 20 MG tablet; Take 1 tablet by mouth daily  -     CBC with Auto Differential; Future  -     Comprehensive Metabolic Panel, Fasting; Future  -     Lipid Panel; Future  -     TSH; Future  -     Urinalysis; Future    Anxiety disorder due to general medical condition  -     hydrOXYzine HCl (ATARAX) 25 MG tablet; TAKE 1 OR 2 TABLETS BY MOUTH NIGHTLY  -     CBC with Auto Differential; Future  -     Comprehensive Metabolic Panel, Fasting; Future  -     TSH; Future    Vitamin D deficiency  -     Vitamin D 25 Hydroxy; Future    Mixed hyperlipidemia  -     CBC with Auto Differential; Future  -     Comprehensive Metabolic Panel, Fasting; Future  -     Lipid Panel; Future    She takes 2 Atarax in the evening. Split this dose to 1 in the afternoon and 1 in the evening to see if Jenn Tovar is controlled. She is due for fasting blood work today, will be sent to the lab for draw. Will recheck in 3 months unless results dictate an earlier visit. Return in about 3 months (around 5/10/2023). Chief Complaint   Patient presents with    Hypertension    Shoulder Pain     Right, edema       Anxiety     Anxiety and confussion in evening is esculated       The patient is here for blood pressure check. Patient has been taking their medications as prescribed. No recent changes to their medications.  No headache or visual disturbances. No dizziness or tinnitus. No chest pain, palpitations, or dyspnea. No nausea and vomiting. No numbness, tingling and or weakness to the extremities. No fevers or chills. No recent illness. Has been slightly more anxious in the late afternoon and early evening hours. Otherwise, has been stable since last visit. Review of Systems   Constitutional:  Negative for appetite change, fatigue and unexpected weight change. HENT:  Negative for congestion, ear pain, hearing loss, sinus pain, sore throat and trouble swallowing. Eyes:  Negative for photophobia, pain, discharge and redness. Respiratory:  Negative for cough, chest tightness and shortness of breath. Cardiovascular:  Negative for chest pain, palpitations and leg swelling. Gastrointestinal:  Negative for abdominal pain, blood in stool, diarrhea, nausea and vomiting. Endocrine: Negative. Genitourinary:  Negative for dysuria, flank pain, frequency and hematuria. Musculoskeletal:  Negative for arthralgias, back pain, joint swelling and myalgias. Skin: Negative. Allergic/Immunologic: Negative. Neurological:  Negative for dizziness, seizures, syncope, weakness, light-headedness, numbness and headaches. Hematological:  Negative for adenopathy. Does not bruise/bleed easily. Psychiatric/Behavioral:  Positive for confusion. Negative for behavioral problems, hallucinations, self-injury and sleep disturbance. The patient is nervous/anxious.         Current Outpatient Medications:     memantine (NAMENDA) 10 MG tablet, Take 1 tablet by mouth daily, Disp: 90 tablet, Rfl: 1    lisinopril (PRINIVIL;ZESTRIL) 20 MG tablet, Take 1 tablet by mouth daily, Disp: 90 tablet, Rfl: 1    hydrOXYzine HCl (ATARAX) 25 MG tablet, TAKE 1 OR 2 TABLETS BY MOUTH NIGHTLY, Disp: 180 tablet, Rfl: 1    acetaminophen (TYLENOL) 325 MG tablet, Take 650 mg by mouth every 6 hours as needed for Pain, Disp: , Rfl:     vitamin D (CHOLECALCIFEROL) 1000 UNIT TABS tablet, Take 1,000 Units by mouth daily , Disp: , Rfl:     aspirin 81 MG tablet, Take by mouth, Disp: , Rfl:     Vitamin E 100 UNITS TABS, Take 1 capsule by mouth daily. , Disp: , Rfl:     Multiple Vitamins-Minerals (THERAPEUTIC MULTIVITAMIN-MINERALS) tablet, Take 1 tablet by mouth daily. , Disp: , Rfl:   Allergies   Allergen Reactions    Biaxin [Clarithromycin] Other (See Comments)     Many years ago, does not recall reaction    Pcn [Penicillins] Rash       Past Medical History:   Diagnosis Date    Dementia ([address]     Epiretinal membrane     Hard of hearing     Hyperlipidemia     Hypertension     Macular degeneration     Osteoarthritis     Vitamin D deficiency      Past Surgical History:   Procedure Laterality Date    CATARACT REMOVAL WITH IMPLANT Bilateral     COLONOSCOPY  2010    COLPOSCOPY      DILATION AND CURETTAGE OF UTERUS      HEMORRHOID SURGERY  years ago    REPAIR RETINAL TEAR/HOLE Left     TONSILLECTOMY AND ADENOIDECTOMY      VITRECTOMY Left 2015     Family History   Problem Relation Age of Onset    Colon Cancer Mother     Hypertension Mother     Lung Cancer Father         Black Lung    Breast Cancer Sister     Lung Cancer Brother     Other Other         6 siblings, most  of infirmities of old age    No Known Problems Sister     No Known Problems Sister     No Known Problems Brother     No Known Problems Brother      Social History     Socioeconomic History    Marital status:       Spouse name: Not on file    Number of children: Not on file    Years of education: Not on file    Highest education level: Not on file   Occupational History    Occupation: retired bakery   Tobacco Use    Smoking status: Never    Smokeless tobacco: Never   Substance and Sexual Activity    Alcohol use: No    Drug use: No    Sexual activity: Not on file   Other Topics Concern    Not on file   Social History Narrative    Not on file     Social Determinants of Health     Financial Resource Strain: Low Risk     Difficulty of Paying Living Expenses: Not hard at all   Food Insecurity: No Food Insecurity    Worried About [facility] in the Last Year: Never true    Ran Out of Food in the Last Year: Never true   Transportation Needs: Unknown    Lack of Transportation (Medical): Not on file    Lack of Transportation (Non-Medical): No   Physical Activity: Insufficiently Active    Days of Exercise per Week: 4 days    Minutes of Exercise per Session: 30 min   Stress: Not on file   Social Connections: Not on file   Intimate Partner Violence: Not on file   Housing Stability: Unknown    Unable to Pay for Housing in the Last Year: Not on file    Number of Places Lived in the Last Year: Not on file    Unstable Housing in the Last Year: No       Vitals:    02/10/23 0810   BP: 130/72   Pulse: 68   Resp: 16   Temp: 97.7 °F (36.5 °C)   TempSrc: Temporal   SpO2: 94%   Weight: 114 lb (51.7 kg)   Height: 5' (1.524 m)       Physical Exam  Vitals and nursing note reviewed. Constitutional:       Appearance: She is well-developed. HENT:      Head: Atraumatic. Right Ear: External ear normal.      Left Ear: External ear normal.      Nose: Nose normal.      Mouth/Throat:      Pharynx: No oropharyngeal exudate. Eyes:      Conjunctiva/sclera: Conjunctivae normal.      Pupils: Pupils are equal, round, and reactive to light. Neck:      Thyroid: No thyromegaly. Trachea: No tracheal deviation. Cardiovascular:      Rate and Rhythm: Normal rate and regular rhythm. Heart sounds: No murmur heard. No friction rub. No gallop. Pulmonary:      Effort: Pulmonary effort is normal. No respiratory distress. Breath sounds: Normal breath sounds. Abdominal:      General: Bowel sounds are normal.      Palpations: Abdomen is soft. Musculoskeletal:         General: No tenderness or deformity. Normal range of motion. Cervical back: Normal range of motion and neck supple.    Lymphadenopathy:      Cervical: No cervical adenopathy. Skin:     General: Skin is warm and dry. Capillary Refill: Capillary refill takes less than 2 seconds. Findings: No rash. Neurological:      Mental Status: She is alert and oriented to person, place, and time. Sensory: No sensory deficit. Motor: No abnormal muscle tone.       Coordination: Coordination normal.      Deep Tendon Reflexes: Reflexes normal.           Seen By:  Andre Macario DO

## 2023-02-15 ENCOUNTER — TELEPHONE (OUTPATIENT)
Dept: FAMILY MEDICINE CLINIC | Age: 88
End: 2023-02-15

## 2023-02-15 NOTE — TELEPHONE ENCOUNTER
Carolina Logan   2/14/2023 10:45 AM EST Back to Top      Son General Buerger called back and was advised.   He would like her to be on a statin and will make sure she is taking enough Vit D.

## 2023-02-15 NOTE — TELEPHONE ENCOUNTER
----- Message from Lele Cho DO sent at 2/11/2023  8:41 AM EST -----  Vit D low, lipids elevated, would recommend statin and 1000 IU Vit D daily

## 2023-02-16 RX ORDER — ROSUVASTATIN CALCIUM 10 MG/1
10 TABLET, COATED ORAL NIGHTLY
Qty: 30 TABLET | Refills: 3 | Status: SHIPPED | OUTPATIENT
Start: 2023-02-16

## 2023-03-01 ENCOUNTER — TELEPHONE (OUTPATIENT)
Dept: FAMILY MEDICINE CLINIC | Age: 88
End: 2023-03-01

## 2023-03-01 DIAGNOSIS — F01.50 VASCULAR DEMENTIA WITHOUT BEHAVIORAL DISTURBANCE (HCC): Primary | ICD-10-CM

## 2023-03-01 NOTE — TELEPHONE ENCOUNTER
Son ellen informed&is more interested in a referral to home health to see about an aide sitting with her in the evenings and over night. She seems to do fine during the day just in the evenings she has trouble. He will call insurance to see if they offer coverage for home health aid, nursing home or assisted living and see whom is in network.  In the mean time he would like a referral for a home health aid

## 2023-03-01 NOTE — TELEPHONE ENCOUNTER
Pts son called in and said she is having a lot of issues with confusion and anxiety. Twice within the last week she has called an ambulance thinking she has poisoned herself. Her son verified she has not. He is at a loss of what he needs to do since things are getting worse.  Please Advise

## 2023-05-08 ENCOUNTER — OFFICE VISIT (OUTPATIENT)
Dept: FAMILY MEDICINE CLINIC | Age: 88
End: 2023-05-08
Payer: MEDICARE

## 2023-05-08 ENCOUNTER — OFFICE VISIT (OUTPATIENT)
Dept: ORTHOPEDIC SURGERY | Age: 88
End: 2023-05-08
Payer: MEDICARE

## 2023-05-08 VITALS
DIASTOLIC BLOOD PRESSURE: 58 MMHG | RESPIRATION RATE: 16 BRPM | WEIGHT: 118 LBS | SYSTOLIC BLOOD PRESSURE: 110 MMHG | BODY MASS INDEX: 23.16 KG/M2 | TEMPERATURE: 97.5 F | OXYGEN SATURATION: 97 % | HEIGHT: 60 IN | HEART RATE: 72 BPM

## 2023-05-08 VITALS — BODY MASS INDEX: 23.16 KG/M2 | HEIGHT: 60 IN | WEIGHT: 118 LBS

## 2023-05-08 DIAGNOSIS — R00.2 PALPITATION: ICD-10-CM

## 2023-05-08 DIAGNOSIS — I10 ESSENTIAL HYPERTENSION: Primary | ICD-10-CM

## 2023-05-08 DIAGNOSIS — F06.4 ANXIETY DISORDER DUE TO GENERAL MEDICAL CONDITION: ICD-10-CM

## 2023-05-08 DIAGNOSIS — M19.011 PRIMARY OSTEOARTHRITIS OF RIGHT SHOULDER: ICD-10-CM

## 2023-05-08 DIAGNOSIS — F01.50 VASCULAR DEMENTIA WITHOUT BEHAVIORAL DISTURBANCE (HCC): ICD-10-CM

## 2023-05-08 DIAGNOSIS — M25.411 EFFUSION OF RIGHT SHOULDER: ICD-10-CM

## 2023-05-08 DIAGNOSIS — M19.011 PRIMARY OSTEOARTHRITIS OF RIGHT SHOULDER: Primary | ICD-10-CM

## 2023-05-08 PROCEDURE — 1123F ACP DISCUSS/DSCN MKR DOCD: CPT | Performed by: FAMILY MEDICINE

## 2023-05-08 PROCEDURE — 20610 DRAIN/INJ JOINT/BURSA W/O US: CPT | Performed by: PHYSICIAN ASSISTANT

## 2023-05-08 PROCEDURE — 99999 PR OFFICE/OUTPT VISIT,PROCEDURE ONLY: CPT | Performed by: PHYSICIAN ASSISTANT

## 2023-05-08 PROCEDURE — 93000 ELECTROCARDIOGRAM COMPLETE: CPT | Performed by: FAMILY MEDICINE

## 2023-05-08 PROCEDURE — 99214 OFFICE O/P EST MOD 30 MIN: CPT | Performed by: FAMILY MEDICINE

## 2023-05-08 RX ORDER — LIDOCAINE HYDROCHLORIDE 10 MG/ML
4 INJECTION, SOLUTION INFILTRATION; PERINEURAL ONCE
Status: COMPLETED | OUTPATIENT
Start: 2023-05-08 | End: 2023-05-08

## 2023-05-08 RX ORDER — TRIAMCINOLONE ACETONIDE 40 MG/ML
40 INJECTION, SUSPENSION INTRA-ARTICULAR; INTRAMUSCULAR ONCE
Status: COMPLETED | OUTPATIENT
Start: 2023-05-08 | End: 2023-05-08

## 2023-05-08 RX ORDER — SERTRALINE HYDROCHLORIDE 25 MG/1
25 TABLET, FILM COATED ORAL DAILY
Qty: 30 TABLET | Refills: 5 | Status: SHIPPED | OUTPATIENT
Start: 2023-05-08

## 2023-05-08 RX ADMIN — TRIAMCINOLONE ACETONIDE 40 MG: 40 INJECTION, SUSPENSION INTRA-ARTICULAR; INTRAMUSCULAR at 09:56

## 2023-05-08 RX ADMIN — LIDOCAINE HYDROCHLORIDE 4 ML: 10 INJECTION, SOLUTION INFILTRATION; PERINEURAL at 09:55

## 2023-05-08 ASSESSMENT — ENCOUNTER SYMPTOMS
BACK PAIN: 0
EYE REDNESS: 0
ALLERGIC/IMMUNOLOGIC NEGATIVE: 1
SINUS PAIN: 0
EYE DISCHARGE: 0
NAUSEA: 0
ABDOMINAL PAIN: 0
DIARRHEA: 0
TROUBLE SWALLOWING: 0
PHOTOPHOBIA: 0
BLOOD IN STOOL: 0
EYE PAIN: 0
SHORTNESS OF BREATH: 0
SORE THROAT: 0
COUGH: 0
CHEST TIGHTNESS: 0
VOMITING: 0

## 2023-05-08 NOTE — PROGRESS NOTES
Established Patient Follow Up  [address] [facility]    Chief Complaint   Patient presents with    Shoulder Pain     Pt presents today with c/o R shoulder pain. She presents today with her son who states the shoulder may be swollen. States she is interested in an injection if that is appropriate. States that pain is pretty constant. States that pain radiates under the arm. Pt is R handed. Pt has taken Tylenol for pain with some relief. Subjective:  Pt is a 80 y.o. female, established pt who presents today for follow up of right shoulder OA. Please refer to the last clinic note from 7/19/2022 as none of the patient's past medical hx has changed. Pt reports she has been doing well but over the course of the last month she has had increase in fluid in the shoulder and pain. Her son is with her in the exam today. She would like to repeat her cortisone injection. Objective: There were no vitals filed for this visit. Pt is alert and oriented x 3, NAD, sitting comfortable in the exam room today. Patient does have a large effusion on the anterior aspect of the right shoulder. No ecchymosis, erythema or open wounds. Patient once again demonstrates limited range of motion with forward flexion and AB duction. .    Radiology Imaging:  No new imaging    Procedure:    Procedure Note: right Shoulder Intra-Articular Glenohumeral Joint Aspiration and Cortisone injection     The risks and benefits of right shoulder aspiration were explained and the patient elected to proceed. After the patient provided verbal consent for the procedure, the Right shoulder was prepped in sterile fashion and the skin was anesthetized with ethyl chloride spray. The joint was entered from a posterior position with an 18g needle and 110cc of serosanginous fluid was aspirated without difficulty. The aspiration was then followed with IA glenohumeral joint injection.   Shoulder was injected with a mixture of 40 mg of Kenalog

## 2023-05-08 NOTE — PROGRESS NOTES
23  Antony Hill : 3/13/1932 Sex: female  Age: 80 y.o. Assessment and Plan:  [place] was seen today for sinusitis, shoulder pain and dementia. Diagnoses and all orders for this visit:    Essential hypertension  Pressure well controlled with ACE inhibitor    Vascular dementia without behavioral disturbance (HCC)  Anxiety becoming more problematic. We will add a low-dose Zoloft to see if this will counteract. Vistaril can be increased or stopped if not effective. Anxiety disorder due to general medical condition  -     sertraline (ZOLOFT) 25 MG tablet; Take 1 tablet by mouth daily    Primary osteoarthritis of right shoulder  -     [facility] In  Walk-in orthopedics today, may benefit from another joint injection. Palpitation  -     EKG 12 Lead; Future  -     EKG 12 Lead  EKG showed a paced rhythm sinus with frequent PACs. Return in about 3 months (around 2023). Chief Complaint   Patient presents with    Sinusitis    Shoulder Pain     worse    Dementia     Worse         The patient is here for blood pressure check. Patient has been taking their medications as prescribed. No recent changes to their medications. No headache or visual disturbances. No dizziness or tinnitus. No chest pain, palpitations, or dyspnea. No nausea and vomiting. No numbness, tingling and or weakness to the extremities. No fevers or chills. Her right shoulder pain is not improving. She complains of pain and discomfort in that joint on a daily basis. She denies any recent trauma or overuse of the joint. Her anxiety is also becoming more problematic. Son denies any behavioral disturbances or wandering. Review of Systems   Constitutional:  Negative for appetite change, fatigue and unexpected weight change. HENT:  Negative for congestion, ear pain, hearing loss, sinus pain, sore throat and trouble swallowing. Eyes:  Negative for photophobia, pain, discharge and redness.

## 2023-06-06 ENCOUNTER — APPOINTMENT (OUTPATIENT)
Dept: CT IMAGING | Age: 88
End: 2023-06-06
Payer: MEDICARE

## 2023-06-06 ENCOUNTER — APPOINTMENT (OUTPATIENT)
Dept: GENERAL RADIOLOGY | Age: 88
End: 2023-06-06
Payer: MEDICARE

## 2023-06-06 ENCOUNTER — HOSPITAL ENCOUNTER (EMERGENCY)
Age: 88
Discharge: HOME | End: 2023-06-06
Payer: MEDICARE

## 2023-06-06 VITALS
WEIGHT: 120 LBS | DIASTOLIC BLOOD PRESSURE: 88 MMHG | HEART RATE: 79 BPM | OXYGEN SATURATION: 99 % | RESPIRATION RATE: 18 BRPM | SYSTOLIC BLOOD PRESSURE: 177 MMHG | HEIGHT: 60 IN | TEMPERATURE: 97.3 F | BODY MASS INDEX: 23.56 KG/M2
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
## 2023-06-06 DIAGNOSIS — S52.121A CLOSED DISPLACED FRACTURE OF HEAD OF RIGHT RADIUS, INITIAL ENCOUNTER: Primary | ICD-10-CM

## 2023-06-06 DIAGNOSIS — S52.614A CLOSED NONDISPLACED FRACTURE OF STYLOID PROCESS OF RIGHT ULNA, INITIAL ENCOUNTER: ICD-10-CM

## 2023-06-06 PROCEDURE — 99284 EMERGENCY DEPT VISIT MOD MDM: CPT

## 2023-06-06 PROCEDURE — 29125 APPL SHORT ARM SPLINT STATIC: CPT

## 2023-06-06 PROCEDURE — 73090 X-RAY EXAM OF FOREARM: CPT

## 2023-06-06 PROCEDURE — 73110 X-RAY EXAM OF WRIST: CPT

## 2023-06-06 PROCEDURE — 73030 X-RAY EXAM OF SHOULDER: CPT

## 2023-06-06 PROCEDURE — 73130 X-RAY EXAM OF HAND: CPT

## 2023-06-06 PROCEDURE — 72125 CT NECK SPINE W/O DYE: CPT

## 2023-06-06 PROCEDURE — 73060 X-RAY EXAM OF HUMERUS: CPT

## 2023-06-06 PROCEDURE — 6370000000 HC RX 637 (ALT 250 FOR IP): Performed by: NURSE PRACTITIONER

## 2023-06-06 PROCEDURE — 73070 X-RAY EXAM OF ELBOW: CPT

## 2023-06-06 PROCEDURE — 70450 CT HEAD/BRAIN W/O DYE: CPT

## 2023-06-06 RX ORDER — ACETAMINOPHEN 500 MG
1000 TABLET ORAL ONCE
Status: COMPLETED | OUTPATIENT
Start: 2023-06-06 | End: 2023-06-06

## 2023-06-06 RX ORDER — IBUPROFEN 600 MG/1
600 TABLET ORAL 4 TIMES DAILY PRN
Qty: 40 TABLET | Refills: 0 | Status: SHIPPED | OUTPATIENT
Start: 2023-06-06

## 2023-06-06 RX ADMIN — ACETAMINOPHEN 1000 MG: 500 TABLET ORAL at 19:29

## 2023-06-06 ASSESSMENT — PAIN DESCRIPTION - ORIENTATION
ORIENTATION: RIGHT
ORIENTATION: RIGHT

## 2023-06-06 ASSESSMENT — PAIN SCALES - GENERAL: PAINLEVEL_OUTOF10: 10

## 2023-06-06 ASSESSMENT — PAIN DESCRIPTION - DESCRIPTORS: DESCRIPTORS: ACHING

## 2023-06-06 ASSESSMENT — PAIN DESCRIPTION - LOCATION
LOCATION: ARM
LOCATION: WRIST

## 2023-06-06 ASSESSMENT — PAIN - FUNCTIONAL ASSESSMENT: PAIN_FUNCTIONAL_ASSESSMENT: 0-10

## 2023-06-07 NOTE — DISCHARGE INSTRUCTIONS
CALL YOUR PCP AND ORTHOPEDIC SURGERY TOMORROW MORNING. ALTERNATE TYLENOL AND MOTRIN FOR PAIN. KEEP SPLINT CLEAN AND DRY. RETURN TO THE ER FOR WORSENING PAIN, FINGER NUMBNESS, TINGLING.

## 2023-06-09 ENCOUNTER — OFFICE VISIT (OUTPATIENT)
Dept: ORTHOPEDIC SURGERY | Age: 88
End: 2023-06-09

## 2023-06-09 VITALS — HEIGHT: 60 IN | BODY MASS INDEX: 23.56 KG/M2 | WEIGHT: 120 LBS

## 2023-06-09 DIAGNOSIS — S52.501A CLOSED FRACTURE OF DISTAL END OF RIGHT RADIUS, UNSPECIFIED FRACTURE MORPHOLOGY, INITIAL ENCOUNTER: Primary | ICD-10-CM

## 2023-06-09 NOTE — PATIENT INSTRUCTIONS
Notify the office if cast becomes loose and starts to slide or becomes too tight and restricting and patient develops throbbing or changes in color to fingers. Your cast will need to be removed and reapplied. Lifting, pushing, pulling with the affected arm (nonweightbearing).     Follow-up in 4 weeks for repeat imaging

## 2023-06-09 NOTE — PROGRESS NOTES
[facility]  ORTHOPAEDICS AND SPORTS MEDICINE  DATE OF VISIT: 23  New Wrist/Hand Patient Visit     Referring Provider:   No referring provider defined for this encounter. CHIEF COMPLAINT:   Chief Complaint   Patient presents with    ED Follow-up     ED 2023 - Closed displaced fracture of head of right radius        HPI:      Sejal Lyons is a 80y.o. year old female who is seen today  for evaluation of right wrist pain. Patient is known to the practice has been seen and treated previously for right shoulder osteoarthritis. She reports the pain has been ongoing for the past 3 days. She does recall a specific injury which started the pain. Patient reports suffering a mechanical fall when she was sweeping her deck several days ago. Was seen and treated initially in the emergency department and placed in a short arm splint after x-rays confirmed a right distal radius fracture. She reports symptoms remain unchanged. She has been compliant with current restrictions.     PAST MEDICAL HISTORY  Past Medical History:   Diagnosis Date    Dementia ([address]     Epiretinal membrane     Hard of hearing     Hyperlipidemia     Hypertension     Macular degeneration     Osteoarthritis     Vitamin D deficiency        PAST SURGICAL HISTORY  Past Surgical History:   Procedure Laterality Date    CATARACT REMOVAL WITH IMPLANT Bilateral     COLONOSCOPY  2010    COLPOSCOPY      DILATION AND CURETTAGE OF UTERUS      HEMORRHOID SURGERY  years ago    REPAIR RETINAL TEAR/HOLE Left     TONSILLECTOMY AND ADENOIDECTOMY      VITRECTOMY Left 2015       FAMILY HISTORY   Family History   Problem Relation Age of Onset    Colon Cancer Mother     Hypertension Mother     Lung Cancer Father         Black Lung    Breast Cancer Sister     Lung Cancer Brother     Other Other         6 siblings, most  of infirmities of old age    No Known Problems Sister     No Known Problems Sister     No Known Problems Brother     No Known

## 2023-06-21 DIAGNOSIS — S52.501A CLOSED FRACTURE OF DISTAL END OF RIGHT RADIUS, UNSPECIFIED FRACTURE MORPHOLOGY, INITIAL ENCOUNTER: Primary | ICD-10-CM

## 2023-06-21 RX ORDER — IBUPROFEN 600 MG/1
600 TABLET ORAL 4 TIMES DAILY PRN
Qty: 40 TABLET | Refills: 0 | Status: SHIPPED | OUTPATIENT
Start: 2023-06-21

## 2023-07-07 RX ORDER — ROSUVASTATIN CALCIUM 10 MG/1
10 TABLET, COATED ORAL NIGHTLY
Qty: 90 TABLET | Refills: 1 | Status: SHIPPED | OUTPATIENT
Start: 2023-07-07

## 2023-07-07 NOTE — TELEPHONE ENCOUNTER
Conor Bowden called, patient needs refill on Rosuvastatin. Can you send to [facility]?     Last Appointment:  5/8/2023  Future Appointments   Date Time Provider [address]   7/11/2023 11:15 AM CHERYL Guerra - CNP [facility] ORTHO [facility]   8/11/2023  8:15 AM [place] DO ROSA M Hirsch [facility]

## 2023-07-11 ENCOUNTER — OFFICE VISIT (OUTPATIENT)
Dept: ORTHOPEDIC SURGERY | Age: 88
End: 2023-07-11
Payer: MEDICARE

## 2023-07-11 VITALS — HEIGHT: 60 IN | WEIGHT: 120 LBS | BODY MASS INDEX: 23.56 KG/M2

## 2023-07-11 DIAGNOSIS — S52.501A CLOSED FRACTURE OF DISTAL END OF RIGHT RADIUS, UNSPECIFIED FRACTURE MORPHOLOGY, INITIAL ENCOUNTER: Primary | ICD-10-CM

## 2023-07-11 PROCEDURE — 1123F ACP DISCUSS/DSCN MKR DOCD: CPT | Performed by: NURSE PRACTITIONER

## 2023-07-11 PROCEDURE — 99213 OFFICE O/P EST LOW 20 MIN: CPT | Performed by: NURSE PRACTITIONER

## 2023-07-11 NOTE — PROGRESS NOTES
[facility]   ORTHOPAEDIC SURGERY AND SPORTS MEDICINE  DATE OF VISIT: 07/11/23  Follow Up Visit     CHIEF COMPLAINT:   Chief Complaint   Patient presents with    Follow-up     Right distal radius fracture, ulnar styloid fracture    Cast Removal     Rt wrist + XR     Pain     Mild pain in wrist       HPI:    Gallo Monroe is a 80y.o. year old female who presented to the office today for follow up of Right distal radius fracture, ulnar styloid fracture, previously evaluated on 6/9/2023. Previous treatment has included short arm cast. She reports symptoms are improved. The patient has responded to the treatment. REVIEW OF SYSTEMS:     General: Negative Fever, chills, fatigue   Cardiovascular: Negative chest pain, palpitations  Respiratory: Equal chest expansion, negative shortness of breath   Skin: Negative rash, open wounds  Psych: Normal affect, mood stable  Neurologic: sensation grossly intact in extremities   Musculoskeletal: See HPI      Physical Exam:     Height: 5' (1.524 m), Weight - Scale: 120 lb (54.4 kg)    General: Alert and oriented x3, no acute distress  Cardiovascular/pulmonary: No labored breathing, peripheral perfusion intact  Musculoskeletal:    Exam of the right wrist displays no swelling or deformity, no tenderness on palpation. Patient able to ambulate all 5 digits. No pain with supination/pronation. Skin warm dry and intact. Controlled Substances Monitoring:      Imaging:  X-rays obtained show stable alignment of distal radius fracture      Assessment: Right distal radius fracture, ulnar styloid      Plan: Today we discussed her right wrist.  Patient does admit to staff that she had been tugging on her cast as it was uncomfortable. Overall she report symptoms are improved. X-rays obtained today show mild angulation of distal radius fracture on lateral view overall in acceptable alignment. She has good range of motion without pain or tenderness on exam today.   Imaging reviewed

## 2023-08-11 ENCOUNTER — OFFICE VISIT (OUTPATIENT)
Dept: FAMILY MEDICINE CLINIC | Age: 88
End: 2023-08-11

## 2023-08-11 ENCOUNTER — OFFICE VISIT (OUTPATIENT)
Dept: FAMILY MEDICINE CLINIC | Age: 88
End: 2023-08-11
Payer: MEDICARE

## 2023-08-11 VITALS
SYSTOLIC BLOOD PRESSURE: 140 MMHG | BODY MASS INDEX: 21.99 KG/M2 | RESPIRATION RATE: 16 BRPM | HEIGHT: 60 IN | OXYGEN SATURATION: 98 % | TEMPERATURE: 97.6 F | DIASTOLIC BLOOD PRESSURE: 80 MMHG | HEART RATE: 76 BPM | WEIGHT: 112 LBS

## 2023-08-11 VITALS
WEIGHT: 112 LBS | TEMPERATURE: 97.6 F | HEART RATE: 76 BPM | OXYGEN SATURATION: 98 % | DIASTOLIC BLOOD PRESSURE: 80 MMHG | SYSTOLIC BLOOD PRESSURE: 140 MMHG | BODY MASS INDEX: 21.99 KG/M2 | HEIGHT: 60 IN | RESPIRATION RATE: 17 BRPM

## 2023-08-11 DIAGNOSIS — Z00.00 MEDICARE ANNUAL WELLNESS VISIT, SUBSEQUENT: Primary | ICD-10-CM

## 2023-08-11 DIAGNOSIS — M19.011 PRIMARY OSTEOARTHRITIS OF RIGHT SHOULDER: ICD-10-CM

## 2023-08-11 DIAGNOSIS — S62.121D CLOSED FRACTURE DISLOCATION OF LUNATE BONE OF RIGHT WRIST WITH ROUTINE HEALING, SUBSEQUENT ENCOUNTER: ICD-10-CM

## 2023-08-11 DIAGNOSIS — T20.20XA PARTIAL THICKNESS BURN OF FACE, INITIAL ENCOUNTER: ICD-10-CM

## 2023-08-11 DIAGNOSIS — I10 ESSENTIAL HYPERTENSION: Primary | ICD-10-CM

## 2023-08-11 DIAGNOSIS — F06.4 ANXIETY DISORDER DUE TO GENERAL MEDICAL CONDITION: ICD-10-CM

## 2023-08-11 DIAGNOSIS — F01.50 VASCULAR DEMENTIA WITHOUT BEHAVIORAL DISTURBANCE (HCC): ICD-10-CM

## 2023-08-11 PROBLEM — S62.121A: Status: ACTIVE | Noted: 2023-08-11

## 2023-08-11 PROCEDURE — 1123F ACP DISCUSS/DSCN MKR DOCD: CPT | Performed by: FAMILY MEDICINE

## 2023-08-11 PROCEDURE — 99214 OFFICE O/P EST MOD 30 MIN: CPT | Performed by: FAMILY MEDICINE

## 2023-08-11 RX ORDER — MEMANTINE HYDROCHLORIDE 10 MG/1
10 TABLET ORAL DAILY
Qty: 90 TABLET | Refills: 1 | Status: SHIPPED | OUTPATIENT
Start: 2023-08-11

## 2023-08-11 RX ORDER — HYDROXYZINE HYDROCHLORIDE 25 MG/1
TABLET, FILM COATED ORAL
Qty: 180 TABLET | Refills: 1 | Status: SHIPPED | OUTPATIENT
Start: 2023-08-11

## 2023-08-11 ASSESSMENT — PATIENT HEALTH QUESTIONNAIRE - PHQ9
SUM OF ALL RESPONSES TO PHQ9 QUESTIONS 1 & 2: 0
SUM OF ALL RESPONSES TO PHQ QUESTIONS 1-9: 0
1. LITTLE INTEREST OR PLEASURE IN DOING THINGS: 0
SUM OF ALL RESPONSES TO PHQ QUESTIONS 1-9: 0
2. FEELING DOWN, DEPRESSED OR HOPELESS: 0

## 2023-08-11 ASSESSMENT — ENCOUNTER SYMPTOMS
CHEST TIGHTNESS: 0
EYE REDNESS: 0
EYE PAIN: 0
NAUSEA: 0
SORE THROAT: 0
DIARRHEA: 0
ABDOMINAL PAIN: 0
BACK PAIN: 0
SHORTNESS OF BREATH: 0
SINUS PAIN: 0
ALLERGIC/IMMUNOLOGIC NEGATIVE: 1
PHOTOPHOBIA: 0
EYE DISCHARGE: 0
TROUBLE SWALLOWING: 0
BLOOD IN STOOL: 0
VOMITING: 0
ROS SKIN COMMENTS: RIGHT TEMPORAL AREA
COUGH: 0

## 2023-08-11 ASSESSMENT — LIFESTYLE VARIABLES
HOW OFTEN DO YOU HAVE A DRINK CONTAINING ALCOHOL: NEVER
HOW MANY STANDARD DRINKS CONTAINING ALCOHOL DO YOU HAVE ON A TYPICAL DAY: PATIENT DOES NOT DRINK

## 2023-08-11 NOTE — PROGRESS NOTES
Medicare Annual Wellness Visit    Georgi Zepeda is here for [facility] AW[facility]    Assessment & Plan   Medicare annual wellness visit, subsequent  Recommendations for Preventive Services Due: see orders and patient instructions/AVS.  Recommended screening schedule for the next 5-10 years is provided to the patient in written form: see Patient Instructions/AVS.     Return for Medicare Annual Wellness Visit in 1 year. Subjective       Patient's complete Health Risk Assessment and screening values have been reviewed and are found in Flowsheets. The following problems were reviewed today and where indicated follow up appointments were made and/or referrals ordered. Positive Risk Factor Screenings with Interventions:    Fall Risk:  Do you feel unsteady or are you worried about falling? : (!) yes  2 or more falls in past year?: no  Fall with injury in past year?: (!) yes     Interventions:    Son advised on home safety tips. Weight and Activity:  Physical Activity: Inactive    Days of Exercise per Week: 0 days    Minutes of Exercise per Session: 0 min     On average, how many days per week do you engage in moderate to strenuous exercise (like a brisk walk)?: 0 days  Have you lost any weight without trying in the past 3 months?: (!) Yes  There is no height or weight on file to calculate BMI. Inactivity Interventions:  Patient declined any further interventions or treatment    Unintentional Weight Loss Interventions:  Patient declined any further interventions or treatment recommended supplemental protein drinks such as Ensure or boost.       Hearing Screen:  Do you or your family notice any trouble with your hearing that hasn't been managed with hearing aids?: (!) Yes    Interventions:  Patient declines any further evaluation or treatment                       Objective   There were no vitals filed for this visit. There is no height or weight on file to calculate BMI.               Allergies   Allergen

## 2023-08-11 NOTE — PATIENT INSTRUCTIONS
every 1-2 years to screen for glaucoma; cataracts, macular degeneration, and other eye disorders. A preventive dental visit is recommended every 6 months. Try to get at least 150 minutes of exercise per week or 10,000 steps per day on a pedometer . Order or download the FREE \"Exercise & Physical Activity: Your Everyday Guide\" from [employer]. Call 5-738.973.6152 or search [employer] online. You need 5297-9800 mg of calcium and 5935-9062 IU of vitamin D per day. It is possible to meet your calcium requirement with diet alone, but a vitamin D supplement is usually necessary to meet this goal.  When exposed to the sun, use a sunscreen that protects against both UVA and UVB radiation with an SPF of 30 or greater. Reapply every 2 to 3 hours or after sweating, drying off with a towel, or swimming. Always wear a seat belt when traveling in a car. Always wear a helmet when riding a bicycle or motorcycle.
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
## 2023-08-29 ENCOUNTER — OFFICE VISIT (OUTPATIENT)
Dept: ORTHOPEDIC SURGERY | Age: 88
End: 2023-08-29
Payer: MEDICARE

## 2023-08-29 VITALS — WEIGHT: 120 LBS | HEIGHT: 62 IN | BODY MASS INDEX: 22.08 KG/M2

## 2023-08-29 DIAGNOSIS — S52.501A CLOSED FRACTURE OF DISTAL END OF RIGHT RADIUS, UNSPECIFIED FRACTURE MORPHOLOGY, INITIAL ENCOUNTER: Primary | ICD-10-CM

## 2023-08-29 PROCEDURE — 99213 OFFICE O/P EST LOW 20 MIN: CPT | Performed by: NURSE PRACTITIONER

## 2023-08-29 PROCEDURE — 1123F ACP DISCUSS/DSCN MKR DOCD: CPT | Performed by: NURSE PRACTITIONER

## 2023-08-29 NOTE — PROGRESS NOTES
seconds. Controlled Substances Monitoring:      Imaging:  X-ray including 3 views of the right wrist displays interval healing of right distal radius fracture. Maintained alignment when compared to previous imaging. Assessment:  Right distal radius fracture      Plan: Today we discussed her right wrist.  She is 3 months out from initial injury resulting in a fracture to her right wrist.  She is done well with nonoperative treatment. She reports symptoms are improved. She displays good range of motion without pain on exam today. No swelling deformity or tenderness present. Imaging obtained displays increased interval healing of fracture and stable alignment. She can resume normal activities as tolerated. Follow-up as needed. Briefly discussed right shoulder she has had it drained and injected in the past. She currently has an effusion but it isn't bothering her. We discussed referral to Dr. Mariano Swift for [address] guided joint aspiration if it becomes bothersome. Patient and son were in agreement with plan of care.       CHERYL Matias-CNP  Orthopaedic Surgery   8/29/23  10:45 AM

## 2023-10-25 DIAGNOSIS — I10 ESSENTIAL HYPERTENSION: ICD-10-CM

## 2023-10-26 ENCOUNTER — TELEPHONE (OUTPATIENT)
Dept: FAMILY MEDICINE CLINIC | Age: 88
End: 2023-10-26

## 2023-10-26 DIAGNOSIS — F06.4 ANXIETY DISORDER DUE TO GENERAL MEDICAL CONDITION: ICD-10-CM

## 2023-10-26 NOTE — TELEPHONE ENCOUNTER
----- Message from [place] sent at 10/25/2023  2:41 PM EDT -----  Subject: Refill Request    QUESTIONS  Name of Medication? lisinopril (PRINIVIL;ZESTRIL) 20 MG tablet  Patient-reported dosage and instructions? 20 mg   How many days do you have left? 7  Preferred Pharmacy? [facility] phone number (if available)? [phone]  ---------------------------------------------------------------------------  --------------  CALL BACK INFO  What is the best way for the office to contact you? OK to leave message on   voicemail  Preferred Call Back Phone Number? [phone]  ---------------------------------------------------------------------------  --------------  SCRIPT ANSWERS  Relationship to Patient? Other/Third Party  Representative Name? Beth Bowden  Is the representative on the Communication Release of Information (WALLY)   form in Epic?  Yes

## 2023-10-26 NOTE — TELEPHONE ENCOUNTER
----- Message from Brian Oneil sent at 10/25/2023  2:42 PM EDT -----  Subject: Refill Request    QUESTIONS  Name of Medication? sertraline (ZOLOFT) 25 MG tablet  Patient-reported dosage and instructions? 25  How many days do you have left? 16  Preferred Pharmacy? [address] [facility] phone number (if available)? [phone]  ---------------------------------------------------------------------------  --------------  CALL BACK INFO  What is the best way for the office to contact you? OK to leave message on   voicemail  Preferred Call Back Phone Number? [phone]  ---------------------------------------------------------------------------  --------------  SCRIPT ANSWERS  Relationship to Patient? Other/Third Party  Representative Name? Socorro Tapia  Is the representative on the Communication Release of Information (WALLY)   form in Epic?  Yes

## 2023-10-29 RX ORDER — LISINOPRIL 20 MG/1
20 TABLET ORAL DAILY
Qty: 90 TABLET | Refills: 1 | Status: SHIPPED | OUTPATIENT
Start: 2023-10-29

## 2023-10-29 RX ORDER — SERTRALINE HYDROCHLORIDE 25 MG/1
25 TABLET, FILM COATED ORAL DAILY
Qty: 30 TABLET | Refills: 5 | Status: SHIPPED | OUTPATIENT
Start: 2023-10-29

## 2023-11-20 ENCOUNTER — OFFICE VISIT (OUTPATIENT)
Dept: FAMILY MEDICINE CLINIC | Age: 88
End: 2023-11-20
Payer: COMMERCIAL

## 2023-11-20 VITALS
DIASTOLIC BLOOD PRESSURE: 70 MMHG | WEIGHT: 112 LBS | BODY MASS INDEX: 20.49 KG/M2 | HEART RATE: 65 BPM | RESPIRATION RATE: 16 BRPM | OXYGEN SATURATION: 97 % | TEMPERATURE: 98.4 F | SYSTOLIC BLOOD PRESSURE: 110 MMHG

## 2023-11-20 DIAGNOSIS — F06.4 ANXIETY DISORDER DUE TO GENERAL MEDICAL CONDITION: ICD-10-CM

## 2023-11-20 DIAGNOSIS — E78.2 MIXED HYPERLIPIDEMIA: ICD-10-CM

## 2023-11-20 DIAGNOSIS — I10 ESSENTIAL HYPERTENSION: Primary | ICD-10-CM

## 2023-11-20 DIAGNOSIS — F01.50 VASCULAR DEMENTIA WITHOUT BEHAVIORAL DISTURBANCE (HCC): ICD-10-CM

## 2023-11-20 PROCEDURE — 99213 OFFICE O/P EST LOW 20 MIN: CPT | Performed by: FAMILY MEDICINE

## 2023-11-20 PROCEDURE — 1123F ACP DISCUSS/DSCN MKR DOCD: CPT | Performed by: FAMILY MEDICINE

## 2023-11-20 RX ORDER — MEMANTINE HYDROCHLORIDE 10 MG/1
10 TABLET ORAL DAILY
Qty: 90 TABLET | Refills: 1 | Status: SHIPPED | OUTPATIENT
Start: 2023-11-20

## 2023-11-20 RX ORDER — ROSUVASTATIN CALCIUM 10 MG/1
10 TABLET, COATED ORAL NIGHTLY
Qty: 90 TABLET | Refills: 1 | Status: SHIPPED | OUTPATIENT
Start: 2023-11-20

## 2023-11-20 RX ORDER — HYDROXYZINE HYDROCHLORIDE 25 MG/1
TABLET, FILM COATED ORAL
Qty: 180 TABLET | Refills: 1 | Status: SHIPPED | OUTPATIENT
Start: 2023-11-20

## 2023-11-20 ASSESSMENT — ENCOUNTER SYMPTOMS
BLOOD IN STOOL: 0
VOMITING: 0
DIARRHEA: 0
EYE PAIN: 0
ALLERGIC/IMMUNOLOGIC NEGATIVE: 1
EYE REDNESS: 0
TROUBLE SWALLOWING: 0
EYE DISCHARGE: 0
SINUS PAIN: 0
ABDOMINAL PAIN: 0
PHOTOPHOBIA: 0
SHORTNESS OF BREATH: 0
NAUSEA: 0
CHEST TIGHTNESS: 0
BACK PAIN: 0
SORE THROAT: 0
COUGH: 0

## 2023-11-20 NOTE — PROGRESS NOTES
23  Eddie Loss : 3/13/1932 Sex: female  Age: 80 y.o. Assessment and Plan:  [place] was seen today for dementia and shoulder pain. Diagnoses and all orders for this visit:    Essential hypertension    Anxiety disorder due to general medical condition  -     hydrOXYzine HCl (ATARAX) 25 MG tablet; TAKE 1 OR 2 TABLETS BY MOUTH NIGHTLY    Vascular dementia without behavioral disturbance (HCC)  -     memantine (NAMENDA) 10 MG tablet; Take 1 tablet by mouth daily    Mixed hyperlipidemia  -     rosuvastatin (CRESTOR) 10 MG tablet; Take 1 tablet by mouth nightly        Return in about 3 months (around 2024), or To establish with Dr. Katalina Douglas. Chief Complaint   Patient presents with    Dementia    Shoulder Pain     rt       Patient returns for recheck, refill. Her mental status remains stable. He is confused but does not wander or become agitated. She has seen orthopedics in the past for her right shoulder. And requires periodic drainage but otherwise doing well. Review of Systems   Constitutional:  Negative for appetite change, fatigue and unexpected weight change. HENT:  Negative for congestion, ear pain, hearing loss, sinus pain, sore throat and trouble swallowing. Eyes:  Negative for photophobia, pain, discharge and redness. Respiratory:  Negative for cough, chest tightness and shortness of breath. Cardiovascular:  Negative for chest pain, palpitations and leg swelling. Gastrointestinal:  Negative for abdominal pain, blood in stool, diarrhea, nausea and vomiting. Endocrine: Negative. Genitourinary:  Negative for dysuria, flank pain, frequency and hematuria. Musculoskeletal:  Positive for arthralgias, joint swelling and myalgias. Negative for back pain. Right shoulder   Skin: Negative. Allergic/Immunologic: Negative. Neurological:  Negative for dizziness, seizures, syncope, weakness, light-headedness, numbness and headaches.    Hematological:  Negative

## 2023-12-06 ENCOUNTER — TELEPHONE (OUTPATIENT)
Dept: FAMILY MEDICINE CLINIC | Age: 88
End: 2023-12-06

## 2023-12-06 NOTE — TELEPHONE ENCOUNTER
Patient's son called. He said they received a letter from the insurance that they will no longer pay for the Hydroxyzine 25mg. Is there there another medication she can take that would be covered?

## 2023-12-08 NOTE — TELEPHONE ENCOUNTER
I left a message for You to call back and let us know what the out what the out of pocket cost of this medication.

## 2023-12-08 NOTE — TELEPHONE ENCOUNTER
Patient's son returned call and stated the insurance company would not provide him with an out of pocket expense for medication.  Son was advised to call PCP's office, that office would need to do a PA for medication.  Son provided a number to call for [facility] [phone].

## 2024-02-23 ENCOUNTER — OFFICE VISIT (OUTPATIENT)
Dept: FAMILY MEDICINE CLINIC | Age: 89
End: 2024-02-23
Payer: COMMERCIAL

## 2024-02-23 VITALS
HEART RATE: 64 BPM | OXYGEN SATURATION: 93 % | BODY MASS INDEX: 20.98 KG/M2 | DIASTOLIC BLOOD PRESSURE: 86 MMHG | WEIGHT: 114 LBS | RESPIRATION RATE: 16 BRPM | SYSTOLIC BLOOD PRESSURE: 138 MMHG | TEMPERATURE: 97.4 F | HEIGHT: 62 IN

## 2024-02-23 DIAGNOSIS — I10 ESSENTIAL HYPERTENSION: ICD-10-CM

## 2024-02-23 DIAGNOSIS — G89.29 CHRONIC RIGHT SHOULDER PAIN: Primary | ICD-10-CM

## 2024-02-23 DIAGNOSIS — F06.4 ANXIETY DISORDER DUE TO GENERAL MEDICAL CONDITION: ICD-10-CM

## 2024-02-23 DIAGNOSIS — M25.511 CHRONIC RIGHT SHOULDER PAIN: Primary | ICD-10-CM

## 2024-02-23 DIAGNOSIS — F01.50 VASCULAR DEMENTIA WITHOUT BEHAVIORAL DISTURBANCE (HCC): ICD-10-CM

## 2024-02-23 PROCEDURE — 99213 OFFICE O/P EST LOW 20 MIN: CPT | Performed by: FAMILY MEDICINE

## 2024-02-23 PROCEDURE — 1123F ACP DISCUSS/DSCN MKR DOCD: CPT | Performed by: FAMILY MEDICINE

## 2024-02-23 RX ORDER — LISINOPRIL 20 MG/1
20 TABLET ORAL DAILY
Qty: 90 TABLET | Refills: 1 | Status: SHIPPED | OUTPATIENT
Start: 2024-02-23

## 2024-02-23 RX ORDER — SERTRALINE HYDROCHLORIDE 25 MG/1
25 TABLET, FILM COATED ORAL DAILY
Qty: 90 TABLET | Refills: 1 | Status: SHIPPED | OUTPATIENT
Start: 2024-02-23

## 2024-02-23 RX ORDER — MEMANTINE HYDROCHLORIDE 10 MG/1
10 TABLET ORAL DAILY
Qty: 90 TABLET | Refills: 1 | Status: SHIPPED | OUTPATIENT
Start: 2024-02-23

## 2024-02-23 SDOH — ECONOMIC STABILITY: FOOD INSECURITY: WITHIN THE PAST 12 MONTHS, THE FOOD YOU BOUGHT JUST DIDN'T LAST AND YOU DIDN'T HAVE MONEY TO GET MORE.: NEVER TRUE

## 2024-02-23 SDOH — ECONOMIC STABILITY: FOOD INSECURITY: WITHIN THE PAST 12 MONTHS, YOU WORRIED THAT YOUR FOOD WOULD RUN OUT BEFORE YOU GOT MONEY TO BUY MORE.: NEVER TRUE

## 2024-02-23 SDOH — ECONOMIC STABILITY: INCOME INSECURITY: HOW HARD IS IT FOR YOU TO PAY FOR THE VERY BASICS LIKE FOOD, HOUSING, MEDICAL CARE, AND HEATING?: NOT HARD AT ALL

## 2024-02-23 ASSESSMENT — ENCOUNTER SYMPTOMS
CONSTIPATION: 0
SHORTNESS OF BREATH: 0
COLOR CHANGE: 0
RHINORRHEA: 0
VOMITING: 0
SINUS PRESSURE: 0
DIARRHEA: 0
BLOOD IN STOOL: 0
CHEST TIGHTNESS: 0
FACIAL SWELLING: 0
ABDOMINAL DISTENTION: 0
SINUS PAIN: 0
PHOTOPHOBIA: 0
APNEA: 0
COUGH: 0

## 2024-02-23 ASSESSMENT — PATIENT HEALTH QUESTIONNAIRE - PHQ9
SUM OF ALL RESPONSES TO PHQ QUESTIONS 1-9: 0
SUM OF ALL RESPONSES TO PHQ QUESTIONS 1-9: 0
1. LITTLE INTEREST OR PLEASURE IN DOING THINGS: 0
SUM OF ALL RESPONSES TO PHQ9 QUESTIONS 1 & 2: 0
2. FEELING DOWN, DEPRESSED OR HOPELESS: 0
SUM OF ALL RESPONSES TO PHQ QUESTIONS 1-9: 0
SUM OF ALL RESPONSES TO PHQ QUESTIONS 1-9: 0

## 2024-02-23 NOTE — PROGRESS NOTES
PHQ-2 Score 0 0 0   PHQ-9 Total Score 0 0 0          No data to display            No continued concerns       /86   Pulse 64   Temp 97.4 °F (36.3 °C) (Temporal)   Resp 16   Ht 1.575 m (5' 2\")   Wt 51.7 kg (114 lb)   SpO2 93%   BMI 20.85 kg/m²     Review of Systems   Constitutional:  Negative for chills, fatigue and fever.   HENT:  Negative for congestion, ear pain, facial swelling, rhinorrhea, sinus pressure and sinus pain.    Eyes:  Negative for photophobia and visual disturbance.   Respiratory:  Negative for apnea, cough, chest tightness and shortness of breath.    Cardiovascular:  Negative for chest pain and palpitations.   Gastrointestinal:  Negative for abdominal distention, blood in stool, constipation, diarrhea and vomiting.   Endocrine: Negative for cold intolerance, polydipsia, polyphagia and polyuria.   Genitourinary:  Negative for decreased urine volume, frequency and urgency.   Musculoskeletal:  Negative for arthralgias and gait problem.   Skin:  Negative for color change.   Allergic/Immunologic: Negative for environmental allergies and food allergies.   Neurological:  Negative for dizziness, light-headedness and headaches.   Hematological:  Negative for adenopathy.   Psychiatric/Behavioral:  Negative for agitation and confusion.        Physical Exam  Constitutional:       Appearance: Normal appearance.   HENT:      Head: Normocephalic and atraumatic.      Nose: Nose normal. No congestion or rhinorrhea.   Eyes:      Extraocular Movements: Extraocular movements intact.      Pupils: Pupils are equal, round, and reactive to light.   Cardiovascular:      Rate and Rhythm: Normal rate and regular rhythm.      Heart sounds: Murmur heard.      Systolic murmur is present with a grade of 2/6.      No friction rub. No gallop.   Pulmonary:      Effort: Pulmonary effort is normal.      Breath sounds: Normal breath sounds.   Chest:      Chest wall: No tenderness.   Abdominal:      General: Abdomen is

## 2024-03-18 DIAGNOSIS — I10 ESSENTIAL HYPERTENSION: ICD-10-CM

## 2024-03-18 DIAGNOSIS — F01.50 VASCULAR DEMENTIA WITHOUT BEHAVIORAL DISTURBANCE (HCC): ICD-10-CM

## 2024-03-18 DIAGNOSIS — F06.4 ANXIETY DISORDER DUE TO GENERAL MEDICAL CONDITION: ICD-10-CM

## 2024-03-18 LAB
ABSOLUTE IMMATURE GRANULOCYTE: <0.03 K/UL (ref 0–0.58)
BASOPHILS ABSOLUTE: 0.01 K/UL (ref 0–0.2)
BASOPHILS RELATIVE PERCENT: 0 % (ref 0–2)
EOSINOPHILS ABSOLUTE: 0.19 K/UL (ref 0.05–0.5)
EOSINOPHILS RELATIVE PERCENT: 6 % (ref 0–6)
HCT VFR BLD CALC: 40.6 % (ref 34–48)
HEMOGLOBIN: 12.5 G/DL (ref 11.5–15.5)
IMMATURE GRANULOCYTES: 1 % (ref 0–5)
LYMPHOCYTES ABSOLUTE: 0.68 K/UL (ref 1.5–4)
LYMPHOCYTES RELATIVE PERCENT: 21 % (ref 20–42)
MCH RBC QN AUTO: 29.2 PG (ref 26–35)
MCHC RBC AUTO-ENTMCNC: 30.8 G/DL (ref 32–34.5)
MCV RBC AUTO: 94.9 FL (ref 80–99.9)
MONOCYTES ABSOLUTE: 0.36 K/UL (ref 0.1–0.95)
MONOCYTES RELATIVE PERCENT: 11 % (ref 2–12)
NEUTROPHILS ABSOLUTE: 2.04 K/UL (ref 1.8–7.3)
NEUTROPHILS RELATIVE PERCENT: 62 % (ref 43–80)
PDW BLD-RTO: 15.4 % (ref 11.5–15)
PLATELET # BLD: 167 K/UL (ref 130–450)
PMV BLD AUTO: 10.3 FL (ref 7–12)
RBC # BLD: 4.28 M/UL (ref 3.5–5.5)
WBC # BLD: 3.3 K/UL (ref 4.5–11.5)

## 2024-03-19 LAB
ALBUMIN SERPL-MCNC: 4.3 G/DL (ref 3.5–5.2)
ALP BLD-CCNC: 101 U/L (ref 35–104)
ALT SERPL-CCNC: 22 U/L (ref 0–32)
ANION GAP SERPL CALCULATED.3IONS-SCNC: 10 MMOL/L (ref 7–16)
AST SERPL-CCNC: 34 U/L (ref 0–31)
BILIRUB SERPL-MCNC: 0.7 MG/DL (ref 0–1.2)
BUN BLDV-MCNC: 11 MG/DL (ref 6–23)
CALCIUM SERPL-MCNC: 9.4 MG/DL (ref 8.6–10.2)
CHLORIDE BLD-SCNC: 101 MMOL/L (ref 98–107)
CHOLESTEROL: 183 MG/DL
CO2: 28 MMOL/L (ref 22–29)
CREAT SERPL-MCNC: 0.7 MG/DL (ref 0.5–1)
GFR SERPL CREATININE-BSD FRML MDRD: >60 ML/MIN/1.73M2
GLUCOSE BLD-MCNC: 80 MG/DL (ref 74–99)
HDLC SERPL-MCNC: 94 MG/DL
LDL CHOLESTEROL: 79 MG/DL
POTASSIUM SERPL-SCNC: 5 MMOL/L (ref 3.5–5)
SODIUM BLD-SCNC: 139 MMOL/L (ref 132–146)
TOTAL PROTEIN: 6.6 G/DL (ref 6.4–8.3)
TRIGL SERPL-MCNC: 50 MG/DL
TSH SERPL DL<=0.05 MIU/L-ACNC: 1.35 UIU/ML (ref 0.27–4.2)
VLDLC SERPL CALC-MCNC: 10 MG/DL

## 2024-03-28 NOTE — PROGRESS NOTES
19  Luis Downs : 3/13/1932 Sex: female  Age: 80 y.o. Chief Complaint   Patient presents with    Hypertension       The patient is here for blood pressure check. Patient has been taking their medications as prescribed. No recent changes to their medications. No headache or visual disturbances. No dizziness or tinnitus. No chest pain, palpitations, or dyspnea. No nausea and vomiting. No numbness, tingling and or weakness to the extremities. No fevers or chills. No recent illness. Memory unchanged, still able to walk in town almost daily without problem. Tolerating medications well      Review of Systems   Constitutional: Negative for appetite change, fatigue and unexpected weight change. HENT: Negative for congestion, ear pain, hearing loss, sinus pain, sore throat and trouble swallowing. Eyes: Negative for photophobia, pain, discharge and redness. Respiratory: Negative for cough, chest tightness and shortness of breath. Cardiovascular: Negative for chest pain, palpitations and leg swelling. Gastrointestinal: Negative for abdominal pain, blood in stool, diarrhea, nausea and vomiting. Endocrine: Negative. Genitourinary: Negative for dysuria, flank pain, frequency and hematuria. Musculoskeletal: Negative for arthralgias, back pain, joint swelling and myalgias. Skin: Negative. Allergic/Immunologic: Negative. Neurological: Negative for dizziness, seizures, syncope, weakness, light-headedness, numbness and headaches. Hematological: Negative for adenopathy. Does not bruise/bleed easily. Psychiatric/Behavioral: Positive for decreased concentration.          Current Outpatient Medications:     hydrOXYzine (ATARAX) 25 MG tablet, 1 or 2 tablets nightly, Disp: 60 tablet, Rfl: 2    lisinopril-hydrochlorothiazide (PRINZIDE;ZESTORETIC) 10-12.5 MG per tablet, Take 1 tablet by mouth daily, Disp: 30 tablet, Rfl: 5    vitamin D (CHOLECALCIFEROL) 1000 UNIT TABS tablet, Take by [facility] has not called back as they said they would. Now patient called back looking for update. Called pre-service to get them to check into this and try to find out what is going on and get it resolved once and for all. Called pre-service and spoke with someone who said that she will call [facility] pharmacy and see if they can back out and reverse when they tried to run the prescription for the patient, to free up the medication so [facility] can process the prescription and get it shipped to the patient. Pre-service said they will send an update when they get this addressed.     Called patient and spoke with her. She reports \"my hands are starting to get blistery again and are very itchy\". Sent a message to Dr. Muchard to advise.              tone. Coordination normal.   Skin: Skin is warm and dry. Capillary refill takes less than 2 seconds. No rash noted. Psychiatric: She has a normal mood and affect. Assessment and Plan:  [place] was seen today for hypertension. Diagnoses and all orders for this visit:    Essential hypertension  -     CBC Auto Differential; Future  -     Comprehensive Metabolic Panel, Fasting; Future  -     Lipid Panel; Future  -     TSH without Reflex; Future  -     Urinalysis; Future  -     Vitamin D 25 Hydroxy; Future    Vascular dementia without behavioral disturbance    Primary osteoarthritis involving multiple joints    Mixed hyperlipidemia  -     Lipid Panel; Future    Vitamin D deficiency  -     Vitamin D 25 Hydroxy; Future        Return in about 2 months (around 10/27/2019).       Seen By:  Trudy Horowitz DO

## 2024-05-06 ENCOUNTER — TRANSCRIBE ORDERS (OUTPATIENT)
Dept: ADMINISTRATIVE | Age: 89
End: 2024-05-06

## 2024-05-06 DIAGNOSIS — M79.672 LEFT FOOT PAIN: ICD-10-CM

## 2024-05-06 DIAGNOSIS — M79.671 RIGHT FOOT PAIN: Primary | ICD-10-CM

## 2024-05-24 ENCOUNTER — HOSPITAL ENCOUNTER (OUTPATIENT)
Dept: ULTRASOUND IMAGING | Age: 89
Discharge: HOME | End: 2024-05-24
Payer: COMMERCIAL

## 2024-05-24 DIAGNOSIS — M79.672 LEFT FOOT PAIN: ICD-10-CM

## 2024-05-24 DIAGNOSIS — M79.671 RIGHT FOOT PAIN: ICD-10-CM

## 2024-05-24 PROCEDURE — 93926 LOWER EXTREMITY STUDY: CPT

## 2024-07-04 ENCOUNTER — HOSPITAL ENCOUNTER (EMERGENCY)
Age: 89
Discharge: HOME | End: 2024-07-04
Payer: MEDICARE

## 2024-07-04 VITALS
BODY MASS INDEX: 23.51 KG/M2 | WEIGHT: 112 LBS | RESPIRATION RATE: 18 BRPM | TEMPERATURE: 97.9 F | DIASTOLIC BLOOD PRESSURE: 78 MMHG | HEART RATE: 65 BPM | OXYGEN SATURATION: 100 % | HEIGHT: 58 IN | SYSTOLIC BLOOD PRESSURE: 163 MMHG

## 2024-07-04 DIAGNOSIS — H61.21 HEARING LOSS DUE TO CERUMEN IMPACTION, RIGHT: Primary | ICD-10-CM

## 2024-07-04 LAB
BILIRUB UR QL STRIP: NEGATIVE
CLARITY UR: CLEAR
COLOR UR: YELLOW
GLUCOSE UR STRIP-MCNC: NEGATIVE MG/DL
HGB UR QL STRIP.AUTO: NEGATIVE
KETONES UR STRIP-MCNC: NEGATIVE MG/DL
LEUKOCYTE ESTERASE UR QL STRIP: NEGATIVE
NITRITE UR QL STRIP: NEGATIVE
PH UR STRIP: 7 [PH] (ref 5–9)
PROT UR STRIP-MCNC: NEGATIVE MG/DL
RBC #/AREA URNS HPF: NORMAL /HPF
SP GR UR STRIP: 1.01 (ref 1–1.03)
UROBILINOGEN UR STRIP-ACNC: 0.2 EU/DL (ref 0–1)
WBC #/AREA URNS HPF: NORMAL /HPF

## 2024-07-04 PROCEDURE — 99283 EMERGENCY DEPT VISIT LOW MDM: CPT

## 2024-07-04 PROCEDURE — 81001 URINALYSIS AUTO W/SCOPE: CPT

## 2024-07-04 PROCEDURE — 69209 REMOVE IMPACTED EAR WAX UNI: CPT

## 2024-07-04 NOTE — ED PROVIDER NOTES
Independent ALEJANDRO Visit.     [facility]  Department of Emergency Medicine   ED  Encounter Note  Admit Date/RoomTime: 2024  9:29 AM  ED Room:   NAME: Naty Butler  : 3/13/1932  MRN: [id number]     Chief Complaint:  Ear Fullness (Right ear today )    HISTORY OF PRESENT ILLNESS        Naty Butler is a 89 y.o. female who presents to the ED with right ear clogged feeling and decreased hearing.  History is provided by son at bedside secondary to patient's history of dementia.  Patient has been  complaining of not being able to hear out of her right ear.  She states normally her left ear is the bad year, but now she cannot hear out of her right ear.  Per son this has been going on since yesterday.  Son states she is also a little bit more confused than normal.  She was hallucinating that some little boy put something in her right ear.  She has hallucinated before.  She is a little bit more confused than normal.  He states that she has periods of worsening confusion.  Per son she has had no recent illness.  No fevers or chills.  She is eating like normal.  No vomiting or diarrhea.  He is not sure if she is urinating more than normal.  Symptoms are moderate in severity      ROS   Pertinent positives and negatives are stated within HPI, all other systems reviewed and are negative.    Past Medical History:  has a past medical history of Dementia (HCC), Epiretinal membrane, Hard of hearing, Hyperlipidemia, Hypertension, Macular degeneration, Osteoarthritis, and Vitamin D deficiency.    Surgical History:  has a past surgical history that includes Tonsillectomy and adenoidectomy; Dilation and curettage of uterus; Cataract removal with implant (Bilateral); repair retinal tear/hole (Left); Hemorrhoid surgery (years ago); vitrectomy (Left, 2015); Colonoscopy (2010); and Colposcopy.    Social History:  reports that she has never smoked. She has never used smokeless tobacco. She reports  that she does not drink alcohol and does not use drugs.    Family History: family history includes Breast Cancer in her sister; Colon Cancer in her mother; Hypertension in her mother; Lung Cancer in her brother and father; No Known Problems in her brother, brother, sister, and sister; Other in an other family member.     Allergies: Biaxin [clarithromycin] and Pcn [penicillins]    PHYSICAL EXAM   Oxygen Saturation Interpretation: Normal on room air analysis.        ED Triage Vitals [07/04/24 0928]   BP Temp Temp src Pulse Respirations SpO2 Height Weight   (!) 163/78 97.9 °F (36.6 °C) -- 65 18 100 % -- --         General:  NAD.  Alert and Oriented.  Well-appearing.  Skin:  Warm, dry.  No rashes.  Head:  Normocephalic.  Atraumatic.  Eyes:  EOMI.  Conjunctiva normal.  ENT:  Oral mucosa moist.  Airway patent.  Posterior pharynx without erythema, without swelling, without exudate.  Uvula is midline with equal rise.  Left TM is opaque white.  Right TM is covered with dark wax.  Neck:  Supple.  Normal ROM.    Respiratory:  No respiratory distress.  No labored breathing.  Lungs clear without rales, rhonchi or wheezing.  Cardiovascular:  Regular rate.  No Murmur.  No peripheral edema.  Extremities warm and good color.  Extremities:  Normal ROM.  Nontender to palpation.  Atraumatic.    Back:  Normal ROM.  Nontender to palpation.  Neuro:  Alert and Oriented to person, place, and oriented to son at bed.  Normal LOC.  Moves all extremities.  Speech fluent.  Psych:  Calm and Cooperative.  She does think a little boy put something in her right ear.    Lab / Imaging Results   (All laboratory and radiology results have been personally reviewed by myself)  Labs:  Results for orders placed or performed during the hospital encounter of 07/04/24   Urinalysis with Microscopic   Result Value Ref Range    Color, UA Yellow Yellow    Turbidity UA Clear Clear    Glucose, Ur NEGATIVE NEGATIVE mg/dL    Bilirubin, Urine NEGATIVE NEGATIVE

## 2024-07-04 NOTE — ED NOTES
Pt hard of hearing baseline. Woke up today with no hearing in the right ear, with a \"full\" feel.

## 2024-07-04 NOTE — ED NOTES
Soaked pt right ear with irrigation solution, then proceeded to flush ear with solution. Repeated process multiple times. Some wax was removed with flush, but large amount remains. Pt states she can hear a little bit now, but majority of hearing is still impaired. Provider notified.

## 2024-07-29 DIAGNOSIS — E78.2 MIXED HYPERLIPIDEMIA: ICD-10-CM

## 2024-07-29 RX ORDER — ROSUVASTATIN CALCIUM 10 MG/1
10 TABLET, COATED ORAL NIGHTLY
Qty: 90 TABLET | Refills: 0 | OUTPATIENT
Start: 2024-07-29

## 2024-07-29 NOTE — TELEPHONE ENCOUNTER
Son called to request refill on rosuvastatin. Rx is ready to be sent to [facility].     Last Appointment:  11/20/2023  Future Appointments   Date Time Provider Department Center   8/16/2024  8:00 AM Alvin Mckeon MD [facility]

## 2024-08-01 DIAGNOSIS — E78.2 MIXED HYPERLIPIDEMIA: ICD-10-CM

## 2024-08-01 RX ORDER — ROSUVASTATIN CALCIUM 10 MG/1
10 TABLET, COATED ORAL NIGHTLY
Qty: 90 TABLET | Refills: 1 | Status: SHIPPED | OUTPATIENT
Start: 2024-08-01

## 2024-08-01 NOTE — TELEPHONE ENCOUNTER
Charles calling in stating he called for refill request on Monday.  Encounter routed to wrong physician.  Medication refill was denied.  Patient is out of medication.  Can you please send in Rx today?  Order pending.  Uses [facility] in [place].

## 2024-08-16 ENCOUNTER — APPOINTMENT (OUTPATIENT)
Dept: ULTRASOUND IMAGING | Age: 89
DRG: 394 | End: 2024-08-16
Payer: MEDICARE

## 2024-08-16 ENCOUNTER — OFFICE VISIT (OUTPATIENT)
Dept: FAMILY MEDICINE CLINIC | Age: 89
End: 2024-08-16
Payer: MEDICARE

## 2024-08-16 ENCOUNTER — APPOINTMENT (OUTPATIENT)
Dept: GENERAL RADIOLOGY | Age: 89
DRG: 394 | End: 2024-08-16
Payer: MEDICARE

## 2024-08-16 ENCOUNTER — HOSPITAL ENCOUNTER (INPATIENT)
Age: 89
LOS: 1 days | Discharge: HOSPICE | DRG: 394 | End: 2024-08-17
Attending: EMERGENCY MEDICINE | Admitting: FAMILY MEDICINE
Payer: MEDICARE

## 2024-08-16 VITALS
TEMPERATURE: 97.9 F | HEIGHT: 58 IN | DIASTOLIC BLOOD PRESSURE: 64 MMHG | HEART RATE: 90 BPM | OXYGEN SATURATION: 94 % | WEIGHT: 105.38 LBS | SYSTOLIC BLOOD PRESSURE: 120 MMHG | BODY MASS INDEX: 22.12 KG/M2

## 2024-08-16 DIAGNOSIS — E78.2 MIXED HYPERLIPIDEMIA: ICD-10-CM

## 2024-08-16 DIAGNOSIS — R53.1 WEAKNESS: ICD-10-CM

## 2024-08-16 DIAGNOSIS — J90 PLEURAL EFFUSION, BILATERAL: ICD-10-CM

## 2024-08-16 DIAGNOSIS — S52.501A CLOSED FRACTURE OF DISTAL END OF RIGHT RADIUS, UNSPECIFIED FRACTURE MORPHOLOGY, INITIAL ENCOUNTER: ICD-10-CM

## 2024-08-16 DIAGNOSIS — F01.50 VASCULAR DEMENTIA WITHOUT BEHAVIORAL DISTURBANCE (HCC): ICD-10-CM

## 2024-08-16 DIAGNOSIS — I10 ESSENTIAL HYPERTENSION: ICD-10-CM

## 2024-08-16 DIAGNOSIS — K56.601 COMPLETE INTESTINAL OBSTRUCTION, UNSPECIFIED CAUSE (HCC): Primary | ICD-10-CM

## 2024-08-16 DIAGNOSIS — R11.0 NAUSEA: Primary | ICD-10-CM

## 2024-08-16 DIAGNOSIS — F06.4 ANXIETY DISORDER DUE TO GENERAL MEDICAL CONDITION: ICD-10-CM

## 2024-08-16 DIAGNOSIS — R11.2 NAUSEA AND VOMITING, UNSPECIFIED VOMITING TYPE: ICD-10-CM

## 2024-08-16 LAB
ALBUMIN SERPL-MCNC: 3.9 G/DL (ref 3.5–5.2)
ALBUMIN: 3.9 G/DL (ref 3.5–5.2)
ALP BLD-CCNC: 86 U/L (ref 35–104)
ALP SERPL-CCNC: 85 U/L (ref 35–104)
ALT SERPL-CCNC: 19 U/L (ref 0–32)
ALT SERPL-CCNC: 20 U/L (ref 0–32)
ANION GAP SERPL CALCULATED.3IONS-SCNC: 13 MMOL/L (ref 7–16)
ANION GAP SERPL CALCULATED.3IONS-SCNC: 14 MMOL/L (ref 7–16)
AST SERPL-CCNC: 30 U/L (ref 0–31)
AST SERPL-CCNC: 33 U/L (ref 0–31)
BASOPHILS # BLD: 0 K/UL (ref 0–0.2)
BASOPHILS ABSOLUTE: 0 K/UL (ref 0–0.2)
BASOPHILS NFR BLD: 0 % (ref 0–2)
BASOPHILS RELATIVE PERCENT: 0 % (ref 0–2)
BILIRUB SERPL-MCNC: 0.7 MG/DL (ref 0–1.2)
BILIRUB SERPL-MCNC: 0.8 MG/DL (ref 0–1.2)
BILIRUBIN, POC: NEGATIVE
BLOOD URINE, POC: ABNORMAL
BUN BLDV-MCNC: 53 MG/DL (ref 6–23)
BUN SERPL-MCNC: 59 MG/DL (ref 6–23)
CALCIUM SERPL-MCNC: 10.9 MG/DL (ref 8.6–10.2)
CALCIUM SERPL-MCNC: 11.7 MG/DL (ref 8.6–10.2)
CHLORIDE BLD-SCNC: 85 MMOL/L (ref 98–107)
CHLORIDE SERPL-SCNC: 83 MMOL/L (ref 98–107)
CLARITY, POC: ABNORMAL
CO2 SERPL-SCNC: 35 MMOL/L (ref 22–29)
CO2: 31 MMOL/L (ref 22–29)
COLOR, POC: YELLOW
CREAT SERPL-MCNC: 1.2 MG/DL (ref 0.5–1)
CREAT SERPL-MCNC: 1.3 MG/DL (ref 0.5–1)
EKG ATRIAL RATE: 87 BPM
EKG P AXIS: 34 DEGREES
EKG P-R INTERVAL: 158 MS
EKG Q-T INTERVAL: 350 MS
EKG QRS DURATION: 92 MS
EKG QTC CALCULATION (BAZETT): 421 MS
EKG R AXIS: -47 DEGREES
EKG T AXIS: 70 DEGREES
EKG VENTRICULAR RATE: 87 BPM
EOSINOPHIL # BLD: 0 K/UL (ref 0.05–0.5)
EOSINOPHILS ABSOLUTE: 0 K/UL (ref 0.05–0.5)
EOSINOPHILS RELATIVE PERCENT: 0 % (ref 0–6)
EOSINOPHILS RELATIVE PERCENT: 0 % (ref 0–6)
ERYTHROCYTE [DISTWIDTH] IN BLOOD BY AUTOMATED COUNT: 15 % (ref 11.5–15)
GFR, ESTIMATED: 40 ML/MIN/1.73M2
GFR, ESTIMATED: 43 ML/MIN/1.73M2
GLUCOSE BLD-MCNC: 124 MG/DL (ref 74–99)
GLUCOSE SERPL-MCNC: 139 MG/DL (ref 74–99)
GLUCOSE URINE, POC: NEGATIVE
HCT VFR BLD AUTO: 37.9 % (ref 34–48)
HCT VFR BLD CALC: 39.7 % (ref 34–48)
HEMOGLOBIN: 12.6 G/DL (ref 11.5–15.5)
HGB BLD-MCNC: 12.7 G/DL (ref 11.5–15.5)
INR PPP: 1.2
KETONES, POC: ABNORMAL
LACTATE BLDV-SCNC: 1.8 MMOL/L (ref 0.5–2.2)
LEUKOCYTE EST, POC: NEGATIVE
LIPASE SERPL-CCNC: 41 U/L (ref 13–60)
LYMPHOCYTES ABSOLUTE: 0.26 K/UL (ref 1.5–4)
LYMPHOCYTES NFR BLD: 0.2 K/UL (ref 1.5–4)
LYMPHOCYTES RELATIVE PERCENT: 4 % (ref 20–42)
LYMPHOCYTES RELATIVE PERCENT: 4 % (ref 20–42)
MAGNESIUM SERPL-MCNC: 2.1 MG/DL (ref 1.6–2.6)
MCH RBC QN AUTO: 28.8 PG (ref 26–35)
MCH RBC QN AUTO: 29.2 PG (ref 26–35)
MCHC RBC AUTO-ENTMCNC: 31.7 G/DL (ref 32–34.5)
MCHC RBC AUTO-ENTMCNC: 33.5 G/DL (ref 32–34.5)
MCV RBC AUTO: 87.1 FL (ref 80–99.9)
MCV RBC AUTO: 90.6 FL (ref 80–99.9)
METAMYELOCYTES ABSOLUTE COUNT: 0.05 K/UL (ref 0–0.12)
METAMYELOCYTES: 1 % (ref 0–1)
MONOCYTES ABSOLUTE: 1.77 K/UL (ref 0.1–0.95)
MONOCYTES NFR BLD: 1.04 K/UL (ref 0.1–0.95)
MONOCYTES NFR BLD: 18 % (ref 2–12)
MONOCYTES RELATIVE PERCENT: 30 % (ref 2–12)
NEUTROPHILS ABSOLUTE: 3.97 K/UL (ref 1.8–7.3)
NEUTROPHILS NFR BLD: 77 % (ref 43–80)
NEUTROPHILS RELATIVE PERCENT: 66 % (ref 43–80)
NEUTS SEG NFR BLD: 4.41 K/UL (ref 1.8–7.3)
NITRITE, POC: NEGATIVE
PDW BLD-RTO: 15.4 % (ref 11.5–15)
PH, POC: 6
PLATELET # BLD AUTO: 192 K/UL (ref 130–450)
PLATELET # BLD: 227 K/UL (ref 130–450)
PMV BLD AUTO: 10.7 FL (ref 7–12)
PMV BLD AUTO: 11.6 FL (ref 7–12)
POTASSIUM SERPL-SCNC: 3.5 MMOL/L (ref 3.5–5)
POTASSIUM SERPL-SCNC: 4.8 MMOL/L (ref 3.5–5)
PROT SERPL-MCNC: 6.7 G/DL (ref 6.4–8.3)
PROTEIN, POC: ABNORMAL
PROTHROMBIN TIME: 13.1 SEC (ref 9.3–12.4)
RBC # BLD AUTO: 4.35 M/UL (ref 3.5–5.5)
RBC # BLD: 4.38 M/UL (ref 3.5–5.5)
RBC # BLD: ABNORMAL 10*6/UL
SARS-COV-2 RDRP RESP QL NAA+PROBE: NOT DETECTED
SODIUM BLD-SCNC: 130 MMOL/L (ref 132–146)
SODIUM SERPL-SCNC: 131 MMOL/L (ref 132–146)
SPECIFIC GRAVITY, POC: >=1.03
SPECIMEN DESCRIPTION: NORMAL
TOTAL PROTEIN: 6.5 G/DL (ref 6.4–8.3)
TROPONIN I SERPL HS-MCNC: 71 NG/L (ref 0–9)
TSH SERPL DL<=0.05 MIU/L-ACNC: 0.44 UIU/ML (ref 0.27–4.2)
UROBILINOGEN, POC: 0.2
WBC # BLD: 6 K/UL (ref 4.5–11.5)
WBC OTHER # BLD: 5.7 K/UL (ref 4.5–11.5)

## 2024-08-16 PROCEDURE — 93005 ELECTROCARDIOGRAM TRACING: CPT | Performed by: EMERGENCY MEDICINE

## 2024-08-16 PROCEDURE — 71045 X-RAY EXAM CHEST 1 VIEW: CPT

## 2024-08-16 PROCEDURE — 87635 SARS-COV-2 COVID-19 AMP PRB: CPT

## 2024-08-16 PROCEDURE — 83735 ASSAY OF MAGNESIUM: CPT

## 2024-08-16 PROCEDURE — 83690 ASSAY OF LIPASE: CPT

## 2024-08-16 PROCEDURE — 87040 BLOOD CULTURE FOR BACTERIA: CPT

## 2024-08-16 PROCEDURE — 84443 ASSAY THYROID STIM HORMONE: CPT

## 2024-08-16 PROCEDURE — 84484 ASSAY OF TROPONIN QUANT: CPT

## 2024-08-16 PROCEDURE — 6360000002 HC RX W HCPCS: Performed by: EMERGENCY MEDICINE

## 2024-08-16 PROCEDURE — 96374 THER/PROPH/DIAG INJ IV PUSH: CPT

## 2024-08-16 PROCEDURE — 99214 OFFICE O/P EST MOD 30 MIN: CPT | Performed by: FAMILY MEDICINE

## 2024-08-16 PROCEDURE — 2580000003 HC RX 258: Performed by: EMERGENCY MEDICINE

## 2024-08-16 PROCEDURE — 85025 COMPLETE CBC W/AUTO DIFF WBC: CPT

## 2024-08-16 PROCEDURE — 80053 COMPREHEN METABOLIC PANEL: CPT

## 2024-08-16 PROCEDURE — 83605 ASSAY OF LACTIC ACID: CPT

## 2024-08-16 PROCEDURE — 85610 PROTHROMBIN TIME: CPT

## 2024-08-16 PROCEDURE — 93970 EXTREMITY STUDY: CPT

## 2024-08-16 PROCEDURE — 99285 EMERGENCY DEPT VISIT HI MDM: CPT

## 2024-08-16 PROCEDURE — 96375 TX/PRO/DX INJ NEW DRUG ADDON: CPT

## 2024-08-16 PROCEDURE — 81002 URINALYSIS NONAUTO W/O SCOPE: CPT | Performed by: FAMILY MEDICINE

## 2024-08-16 PROCEDURE — 1123F ACP DISCUSS/DSCN MKR DOCD: CPT | Performed by: FAMILY MEDICINE

## 2024-08-16 RX ORDER — PANTOPRAZOLE SODIUM 40 MG/10ML
80 INJECTION, POWDER, LYOPHILIZED, FOR SOLUTION INTRAVENOUS ONCE
Status: COMPLETED | OUTPATIENT
Start: 2024-08-16 | End: 2024-08-16

## 2024-08-16 RX ORDER — HYDROXYZINE HYDROCHLORIDE 25 MG/1
TABLET, FILM COATED ORAL
Qty: 180 TABLET | Refills: 1 | Status: CANCELLED | OUTPATIENT
Start: 2024-08-16

## 2024-08-16 RX ORDER — PANTOPRAZOLE SODIUM 40 MG/10ML
40 INJECTION, POWDER, LYOPHILIZED, FOR SOLUTION INTRAVENOUS ONCE
Status: DISCONTINUED | OUTPATIENT
Start: 2024-08-16 | End: 2024-08-16

## 2024-08-16 RX ORDER — PANTOPRAZOLE SODIUM 20 MG/1
20 TABLET, DELAYED RELEASE ORAL
Qty: 90 TABLET | Refills: 1 | Status: ON HOLD
Start: 2024-08-16 | End: 2024-08-17 | Stop reason: HOSPADM

## 2024-08-16 RX ORDER — 0.9 % SODIUM CHLORIDE 0.9 %
1000 INTRAVENOUS SOLUTION INTRAVENOUS ONCE
Status: COMPLETED | OUTPATIENT
Start: 2024-08-16 | End: 2024-08-16

## 2024-08-16 RX ORDER — SERTRALINE HYDROCHLORIDE 25 MG/1
25 TABLET, FILM COATED ORAL DAILY
Qty: 90 TABLET | Refills: 1 | Status: ON HOLD
Start: 2024-08-16 | End: 2024-08-17 | Stop reason: HOSPADM

## 2024-08-16 RX ORDER — BUSPIRONE HYDROCHLORIDE 5 MG/1
5 TABLET ORAL 2 TIMES DAILY
Qty: 60 TABLET | Refills: 0 | Status: ON HOLD
Start: 2024-08-16 | End: 2024-08-17 | Stop reason: HOSPADM

## 2024-08-16 RX ORDER — ROSUVASTATIN CALCIUM 10 MG/1
10 TABLET, COATED ORAL NIGHTLY
Qty: 90 TABLET | Refills: 1 | Status: ON HOLD
Start: 2024-08-16 | End: 2024-08-17 | Stop reason: HOSPADM

## 2024-08-16 RX ORDER — MEMANTINE HYDROCHLORIDE 10 MG/1
10 TABLET ORAL DAILY
Qty: 90 TABLET | Refills: 1 | Status: ON HOLD
Start: 2024-08-16 | End: 2024-08-17 | Stop reason: HOSPADM

## 2024-08-16 RX ORDER — LISINOPRIL 20 MG/1
20 TABLET ORAL DAILY
Qty: 90 TABLET | Refills: 1 | Status: ON HOLD
Start: 2024-08-16 | End: 2024-08-17 | Stop reason: HOSPADM

## 2024-08-16 RX ORDER — ONDANSETRON 2 MG/ML
4 INJECTION INTRAMUSCULAR; INTRAVENOUS ONCE
Status: COMPLETED | OUTPATIENT
Start: 2024-08-16 | End: 2024-08-16

## 2024-08-16 RX ADMIN — PANTOPRAZOLE SODIUM 80 MG: 40 INJECTION, POWDER, FOR SOLUTION INTRAVENOUS at 22:06

## 2024-08-16 RX ADMIN — SODIUM CHLORIDE 1000 ML: 9 INJECTION, SOLUTION INTRAVENOUS at 22:05

## 2024-08-16 RX ADMIN — ONDANSETRON 4 MG: 2 INJECTION INTRAMUSCULAR; INTRAVENOUS at 22:04

## 2024-08-16 ASSESSMENT — ENCOUNTER SYMPTOMS
SHORTNESS OF BREATH: 0
VOMITING: 1
COUGH: 0
NAUSEA: 1
ABDOMINAL PAIN: 1

## 2024-08-17 ENCOUNTER — APPOINTMENT (OUTPATIENT)
Dept: GENERAL RADIOLOGY | Age: 89
DRG: 394 | End: 2024-08-17
Payer: MEDICARE

## 2024-08-17 ENCOUNTER — APPOINTMENT (OUTPATIENT)
Dept: CT IMAGING | Age: 89
DRG: 394 | End: 2024-08-17
Payer: MEDICARE

## 2024-08-17 VITALS
RESPIRATION RATE: 20 BRPM | SYSTOLIC BLOOD PRESSURE: 104 MMHG | BODY MASS INDEX: 22.65 KG/M2 | HEART RATE: 98 BPM | DIASTOLIC BLOOD PRESSURE: 56 MMHG | TEMPERATURE: 98.2 F | HEIGHT: 57 IN | OXYGEN SATURATION: 95 % | WEIGHT: 105 LBS

## 2024-08-17 PROBLEM — K56.609 SBO (SMALL BOWEL OBSTRUCTION) (HCC): Status: ACTIVE | Noted: 2024-08-17

## 2024-08-17 PROBLEM — K41.90 FEMORAL HERNIA: Status: ACTIVE | Noted: 2024-08-17

## 2024-08-17 PROBLEM — E83.52 HYPERCALCEMIA: Status: ACTIVE | Noted: 2024-08-17

## 2024-08-17 PROBLEM — I82.4Y9 DEEP VEIN THROMBOSIS (DVT) OF PROXIMAL LOWER EXTREMITY (HCC): Status: ACTIVE | Noted: 2024-08-17

## 2024-08-17 PROBLEM — N17.9 AKI (ACUTE KIDNEY INJURY) (HCC): Status: ACTIVE | Noted: 2024-08-17

## 2024-08-17 PROBLEM — K92.2 UPPER GI BLEED: Status: ACTIVE | Noted: 2024-08-17

## 2024-08-17 PROBLEM — K56.601 COMPLETE INTESTINAL OBSTRUCTION (HCC): Status: ACTIVE | Noted: 2024-08-17

## 2024-08-17 PROBLEM — J90 PLEURAL EFFUSION: Status: ACTIVE | Noted: 2024-08-17

## 2024-08-17 LAB — TROPONIN I SERPL HS-MCNC: 55 NG/L (ref 0–9)

## 2024-08-17 PROCEDURE — 6360000004 HC RX CONTRAST MEDICATION: Performed by: RADIOLOGY

## 2024-08-17 PROCEDURE — 6360000002 HC RX W HCPCS: Performed by: EMERGENCY MEDICINE

## 2024-08-17 PROCEDURE — 2060000000 HC ICU INTERMEDIATE R&B

## 2024-08-17 PROCEDURE — 6360000002 HC RX W HCPCS: Performed by: STUDENT IN AN ORGANIZED HEALTH CARE EDUCATION/TRAINING PROGRAM

## 2024-08-17 PROCEDURE — 99235 HOSP IP/OBS SAME DATE MOD 70: CPT | Performed by: FAMILY MEDICINE

## 2024-08-17 PROCEDURE — 2700000000 HC OXYGEN THERAPY PER DAY

## 2024-08-17 PROCEDURE — 71275 CT ANGIOGRAPHY CHEST: CPT

## 2024-08-17 PROCEDURE — 99223 1ST HOSP IP/OBS HIGH 75: CPT | Performed by: STUDENT IN AN ORGANIZED HEALTH CARE EDUCATION/TRAINING PROGRAM

## 2024-08-17 PROCEDURE — 74177 CT ABD & PELVIS W/CONTRAST: CPT

## 2024-08-17 PROCEDURE — 70450 CT HEAD/BRAIN W/O DYE: CPT

## 2024-08-17 PROCEDURE — 2580000003 HC RX 258: Performed by: STUDENT IN AN ORGANIZED HEALTH CARE EDUCATION/TRAINING PROGRAM

## 2024-08-17 RX ORDER — SODIUM CHLORIDE 0.9 % (FLUSH) 0.9 %
5-40 SYRINGE (ML) INJECTION PRN
Status: DISCONTINUED | OUTPATIENT
Start: 2024-08-17 | End: 2024-08-17 | Stop reason: HOSPADM

## 2024-08-17 RX ORDER — ONDANSETRON 2 MG/ML
4 INJECTION INTRAMUSCULAR; INTRAVENOUS EVERY 6 HOURS PRN
Status: DISCONTINUED | OUTPATIENT
Start: 2024-08-17 | End: 2024-08-17 | Stop reason: HOSPADM

## 2024-08-17 RX ORDER — SODIUM CHLORIDE 9 MG/ML
INJECTION, SOLUTION INTRAVENOUS CONTINUOUS
Status: DISCONTINUED | OUTPATIENT
Start: 2024-08-17 | End: 2024-08-17 | Stop reason: HOSPADM

## 2024-08-17 RX ORDER — SODIUM CHLORIDE 0.9 % (FLUSH) 0.9 %
5-40 SYRINGE (ML) INJECTION EVERY 12 HOURS SCHEDULED
Status: DISCONTINUED | OUTPATIENT
Start: 2024-08-17 | End: 2024-08-17 | Stop reason: HOSPADM

## 2024-08-17 RX ORDER — MORPHINE SULFATE 2 MG/ML
1 INJECTION, SOLUTION INTRAMUSCULAR; INTRAVENOUS
Status: DISCONTINUED | OUTPATIENT
Start: 2024-08-17 | End: 2024-08-17 | Stop reason: HOSPADM

## 2024-08-17 RX ORDER — LORAZEPAM 2 MG/ML
0.5 INJECTION INTRAMUSCULAR ONCE
Status: COMPLETED | OUTPATIENT
Start: 2024-08-17 | End: 2024-08-17

## 2024-08-17 RX ORDER — SODIUM CHLORIDE 9 MG/ML
INJECTION, SOLUTION INTRAVENOUS PRN
Status: DISCONTINUED | OUTPATIENT
Start: 2024-08-17 | End: 2024-08-17 | Stop reason: HOSPADM

## 2024-08-17 RX ORDER — ONDANSETRON 4 MG/1
4 TABLET, ORALLY DISINTEGRATING ORAL EVERY 8 HOURS PRN
Status: DISCONTINUED | OUTPATIENT
Start: 2024-08-17 | End: 2024-08-17 | Stop reason: HOSPADM

## 2024-08-17 RX ORDER — MORPHINE SULFATE 2 MG/ML
2 INJECTION, SOLUTION INTRAMUSCULAR; INTRAVENOUS
Status: DISCONTINUED | OUTPATIENT
Start: 2024-08-17 | End: 2024-08-17 | Stop reason: HOSPADM

## 2024-08-17 RX ORDER — PANTOPRAZOLE SODIUM 40 MG/10ML
40 INJECTION, POWDER, LYOPHILIZED, FOR SOLUTION INTRAVENOUS 2 TIMES DAILY
Status: DISCONTINUED | OUTPATIENT
Start: 2024-08-17 | End: 2024-08-17 | Stop reason: HOSPADM

## 2024-08-17 RX ADMIN — IOPAMIDOL 75 ML: 755 INJECTION, SOLUTION INTRAVENOUS at 00:23

## 2024-08-17 RX ADMIN — LORAZEPAM 0.5 MG: 2 INJECTION INTRAMUSCULAR; INTRAVENOUS at 03:22

## 2024-08-17 RX ADMIN — PANTOPRAZOLE SODIUM 40 MG: 40 INJECTION, POWDER, FOR SOLUTION INTRAVENOUS at 08:57

## 2024-08-17 RX ADMIN — SODIUM CHLORIDE: 9 INJECTION, SOLUTION INTRAVENOUS at 04:47

## 2024-08-17 RX ADMIN — MORPHINE SULFATE 1 MG: 2 INJECTION, SOLUTION INTRAMUSCULAR; INTRAVENOUS at 12:05

## 2024-08-17 RX ADMIN — SODIUM CHLORIDE, PRESERVATIVE FREE 10 ML: 5 INJECTION INTRAVENOUS at 08:56

## 2024-08-17 NOTE — ED NOTES
Attempted to place patient on monitor, patient keeps ripping off monitor and will not keep any leads on or their BP cuff. Desmond notified.

## 2024-08-17 NOTE — H&P
[facility]  Resident History and Physical      CHIEF COMPLAINT:    Chief Complaint   Patient presents with    Emesis     Started today and family stated it is black        History of Present Illness:   Naty Butler  is a 89 y.o. female patient of Alvin Mckeon MD  with a pertinent PMHx of anxiety, dementia, hyperlipidemia, hypertension, and hyponatremia who presented to the ER fwith chief complaint of black emesis.    All history was obtained from her children, Charles (POA) and Theodore due to patient's extreme fatigue and difficulty to arouse..  Patient lives by herself but her sons do keep a close eye on her.  They report that over the past 6 months, patient has been declining but has gotten much work over the past week.  They state that her oral intake has been decreasing and she has lost 7 pounds in the past 6 months.  Her confusion and dementia has also been getting worse.  They report that over the past week, she has been eating very little.  She had 1 episode of emesis 3 days ago.  Oral intake has been minimal since.  They do believe the patient has been urinating but has not had a bowel movement in at least 1 day.    Patient was seen by Dr. Mckeon earlier today who recommended they come to the ER for evaluation.  Per the children, Dr. Mckeon did start the hospice/palliative care discussion.  After this appointment, they report that patient had several episodes of emesis that looked like black coffee.  She has developed some weakness and swelling in her legs.    Children states that they do not believe that the patient would want any aggressive intervention.  However, they would like to speak with palliative care as well as consultants that specialize in her current medical issues including pulmonology and general surgery.    We reviewed that her current CODE STATUS prior to arrival today was DNR CCA.  We discussed that with DNR CCA status, the patient would not be resuscitated          Past Medical History:   Diagnosis Date    Dementia (HCC)     Epiretinal membrane     Hard of hearing     Hyperlipidemia     Hypertension     Macular degeneration     Osteoarthritis     Vitamin D deficiency          Past Surgical History:   Procedure Laterality Date    CATARACT REMOVAL WITH IMPLANT Bilateral     COLONOSCOPY  08/09/2010    COLPOSCOPY      DILATION AND CURETTAGE OF UTERUS      HEMORRHOID SURGERY  years ago    REPAIR RETINAL TEAR/HOLE Left     TONSILLECTOMY AND ADENOIDECTOMY      VITRECTOMY Left 03/11/2015       Medications Prior to Admission:    Prior to Admission medications    Medication Sig Start Date End Date Taking? Authorizing Provider   memantine (NAMENDA) 10 MG tablet Take 1 tablet by mouth daily 8/16/24  Yes Alvin Mckeon MD   lisinopril (PRINIVIL;ZESTRIL) 20 MG tablet Take 1 tablet by mouth daily 8/16/24  Yes Alvin Mckeon MD   busPIRone (BUSPAR) 5 MG tablet Take 1 tablet by mouth 2 times daily 8/16/24 9/15/24 Yes Alvin Mckeon MD   pantoprazole (PROTONIX) 20 MG tablet Take 1 tablet by mouth every morning (before breakfast) 8/16/24  Yes Alvin Mckeon MD   hydrOXYzine HCl (ATARAX) 25 MG tablet TAKE 1 OR 2 TABLETS BY MOUTH NIGHTLY 11/20/23  Yes Eliel Corona DO   acetaminophen (TYLENOL) 325 MG tablet Take 2 tablets by mouth every 6 hours as needed for Pain   Yes Qi Guardado MD   aspirin 81 MG tablet Take by mouth   Yes Qi Guardado MD   Multiple Vitamins-Minerals (THERAPEUTIC MULTIVITAMIN-MINERALS) tablet Take 1 tablet by mouth daily   Yes Qi Guardado MD   rosuvastatin (CRESTOR) 10 MG tablet Take 1 tablet by mouth nightly 8/16/24   Alvin Mckeon MD   sertraline (ZOLOFT) 25 MG tablet Take 1 tablet by mouth daily 8/16/24   Alvin Mckeon MD   vitamin D (CHOLECALCIFEROL) 1000 UNIT TABS tablet Take 1 tablet by mouth daily    Qi Guardado MD   Vitamin E 100 UNITS TABS Take 1 capsule by mouth daily.    Qi Guardado MD        Allergies:   Biaxin

## 2024-08-17 NOTE — DISCHARGE SUMMARY
Physician Discharge Summary  [facility]     Patient ID:  Naty Butler  [id number]  89 y.o.  3/13/1932    Admit date: 8/16/2024    Discharge date: 8/17/2024    Admission Diagnoses:   SBO (small bowel obstruction) (HCC) [K56.609]  Pleural effusion, bilateral [J90]  Complete intestinal obstruction, unspecified cause (HCC) [K56.601]  Nausea and vomiting, unspecified vomiting type [R11.2]    Discharge Diagnoses:  Principal Problem:    SBO (small bowel obstruction) (HCC)  Active Problems:    Anxiety disorder due to general medical condition    Vascular dementia without behavioral disturbance (HCC)    Essential hypertension    Hyperlipidemia    Hyponatremia    Femoral hernia    NURY (acute kidney injury) (HCC)    Hypercalcemia    Upper GI bleed    Pleural effusion    Deep vein thrombosis (DVT) of proximal lower extremity (HCC)  Resolved Problems:    * No resolved hospital problems. *      Consults: general surgery, hospice  Procedures: None    Hospital Course:     Naty Butler  is a 89 y.o. female patient of Alvin Mckeon MD  with a pertinent PMHx of anxiety, dementia, hyperlipidemia, hypertension, and hyponatremia who presented to the ER with chief complaint of black emesis and was admitted for SBO with strangulated hernia, suspected UGI bleed, NURY, DVT, FTT. In the ED,  patient was tachycardic up to 108 with normal blood pressure.    CBC was unremarkable.  CMP was notable for hyponatremia at 131, hypochloremia at 83, metabolic alkalosis with bicarb 35, and BUN of 59 and creatinine 1.3 (baseline 0.7-0.8), hypercalcemia of 11.7.  Lipase, lactate, TSH was normal. Troponins were cycled x 2 (71 > 55).  EKG showed sinus rhythm with PACs and left anterior fascicular block, significantly changed from previous on 07/2021. Lower extremity Doppler showed some thrombus in the left lower extremity posterior peroneal vein, unsure if acute or chronic in addition to a Baker's cyst of the right  popliteal fossa. Chest x-ray showed a moderate left-sided pleural effusion with atelectasis.  CTA pulm showed a moderate left-sided pleural effusion with adjacent consolidation suggestive of compressive atelectasis and groundglass opacities in the right lung base.  Esophagus was also noted to be distended and fluid-filled.  CT abdomen showed high-grade SBO with transition point in the right lower abdominal area with a bowel containing femoral hernia and a calcified uterus. General surgery was consulted and after their discussion with family regarding patient's strangulated femoral hernia, HOTV was consulted. Discussed case with attending physician and hospice nurse with family at bedside. Family chose hospice care for patient, patient code status changed to DNR CC. Questions answered, support provided. Patient was discharged to hospice house.     Significant Diagnostic Studies:   CT HEAD WO CONTRAST   Final Result   No acute intracranial abnormality.         CT ABDOMEN PELVIS W IV CONTRAST Additional Contrast? None   Final Result   1. High-grade small bowel obstruction with transition point in the right   lower abdomen where there is a bowel containing femoral hernia.   2. Small volume intra-abdominal ascites.   3. Age indeterminate compression fracture at L2.   4. Likely calcified uterine fibroids.         CTA PULMONARY W CONTRAST   Final Result   1. No evidence of pulmonary embolism.   2. Tiny right and moderate left pleural effusions with adjacent consolidation   likely compressive atelectasis.   3. Ground-glass opacities in the right lung base may be infectious or   inflammatory.   4. Distended fluid-filled esophagus.   5. L2 compression fracture of unknown timing.         Vascular duplex lower extremity venous bilateral   Final Result   1.  There appears to be some thrombus in the left lower extremity posterior   peroneal vein.  It is not clear if this represents an acute or chronic   change.  Please correlate

## 2024-08-17 NOTE — ED NOTES
Attempted NG tube insertion without success.  Patient unable to tolerate NG tube and pulled it out immediately after insertion.    Dr. Desmond crews.

## 2024-08-17 NOTE — CONSULTS
Surgery History and Physical/Consult Note    CC:    Dark emesis    HPI:  This is a 89 y.o. female with PMH dementia admitted 2024 with dark emesis and SBO. She has severe dementia and history is very limited due to family not being present. She came in due to dark emesis. She does liver by herself but it has become very difficult over the past 6 months. She has been losing weight and has not eaten much. She had a CT in the ER which showed a strangulated femoral hernia. The ER tried to reduce this but could not. There was discussion with the family that the patient would not want surgery and she was admitted for palliative care but was not changed to a DNR CC at that time.      PMH:  Past Medical History:   Diagnosis Date    Dementia (HCC)     Epiretinal membrane     Hard of hearing     Hyperlipidemia     Hypertension     Macular degeneration     Osteoarthritis     Vitamin D deficiency        PSH:  Past Surgical History:   Procedure Laterality Date    CATARACT REMOVAL WITH IMPLANT Bilateral     COLONOSCOPY  2010    COLPOSCOPY      DILATION AND CURETTAGE OF UTERUS      HEMORRHOID SURGERY  years ago    REPAIR RETINAL TEAR/HOLE Left     TONSILLECTOMY AND ADENOIDECTOMY      VITRECTOMY Left 2015       Family History:  Family History   Problem Relation Age of Onset    Colon Cancer Mother     Hypertension Mother     Lung Cancer Father         Black Lung    Breast Cancer Sister     Lung Cancer Brother     Other Other         6 siblings, most  of infirmities of old age    No Known Problems Sister     No Known Problems Sister     No Known Problems Brother     No Known Problems Brother        Social History:   reports that she has never smoked. She has never used smokeless tobacco. She reports that she does not drink alcohol and does not use drugs.    Review of Systems:  A 14pt complete review of systems was performed, and all pertinent positives and negatives are listed in the HPI. All other systems are  Opacity at the left base with silhouetting of the cardiac apex and left hemidiaphragm consistent with moderate left effusion and associated atelectasis.  Right lung clear.  No cardiomediastinal shift.  Thoracic dextroscoliosis and chronic appearing right shoulder subluxation/dislocation noted.     Moderate left pleural effusion and associated atelectasis.     XR ABDOMEN (KUB) (SINGLE AP VIEW)    Result Date: 8/16/2024  EXAMINATION: ONE SUPINE XRAY VIEW(S) OF THE ABDOMEN 8/16/2024 8:41 am COMPARISON: None. HISTORY: ORDERING SYSTEM PROVIDED HISTORY: Nausea FINDINGS: Nonspecific bowel gas pattern without evidence of obstruction. No abnormal calcifications. No acute osseous abnormality.  Oval calcified uterine fibroids.  Colonic fecal retention involving the sigmoid and ascending colon. Degenerative changes lumbar spine.     No evidence of bowel obstruction. Colonic fecal retention involving the sigmoid and ascending colon. Probable calcified uterine fibroids.             ASSESSMENT & PLAN:    I have examined the patient and performed key aspects of physical exam, reviewed the record (including all pertinent and new radiology images and laboratory findings), and discussed the case with the surgical team.  I agree with the assessment and plan with the following additions, corrections, and changes.    This is a 89 y.o. female admitted 8/16/2024 with strangulated femoral hernia.    Plan:  Had long discussion with both of her sons over the phone this morning- they agree that she would not want surgery which is completely reasonable  Hospice consult placed  Will be available as needed if something changes  Discussed with Family medicine as well    Abhijit Wood,    8/17/2024   9:25 AM

## 2024-08-17 NOTE — PROGRESS NOTES
[facility]  Progress Note    Chief complaint :  Chief Complaint   Patient presents with    Emesis     Started today and family stated it is black       Subjective:    No acute events overnight. Pt was difficult to re-direct per nursing staff in the ER and was given ativan. Since then, she been very somnolent and difficult to arouse. No issues with nursing staff up on the floor. Unable to obtain ROS due to patients poor mental status.    Past medical, surgical, family and social history were reviewed, non-contributory, and unchanged unless otherwise stated.    Unable to obtain ROS due to patients poor mental status.      Objective:  BP (!) 91/51   Pulse 80   Temp 97.1 °F (36.2 °C) (Temporal)   Resp 18   Ht 1.448 m (4' 9\")   Wt 47.6 kg (105 lb)   SpO2 95%   BMI 22.72 kg/m²     Physical Exam  Vitals reviewed.   Constitutional:       General: She is not in acute distress.     Appearance: She is ill-appearing.   HENT:      Head: Normocephalic and atraumatic.      Nose: Nose normal. No congestion or rhinorrhea.      Mouth/Throat:      Mouth: Mucous membranes are dry.      Pharynx: Oropharynx is clear.      Comments: No blood noted in pharynx  Eyes:      General:         Right eye: No discharge.         Left eye: No discharge.      Conjunctiva/sclera: Conjunctivae normal.   Cardiovascular:      Rate and Rhythm: Normal rate and regular rhythm.      Pulses: Normal pulses.      Heart sounds: Normal heart sounds.   Pulmonary:      Effort: Pulmonary effort is normal.      Breath sounds: Normal breath sounds.   Abdominal:      General: Bowel sounds are normal. There is distension.      Tenderness: There is abdominal tenderness.   Musculoskeletal:      Cervical back: Neck supple. No tenderness.      Right lower leg: No edema.      Left lower leg: No edema.   Skin:     General: Skin is warm and dry.   Neurological:      General: No focal deficit present.      Motor: No weakness.

## 2024-08-17 NOTE — ED NOTES
ED to Inpatient Handoff Report    Notified RN that electronic handoff available and patient ready for transport to room 0433.    Safety Risks: Memory Problems, Hearing problems, and Risk of falls    Patient in Restraints: no    Constant Observer or Patient : no    Telemetry Monitoring Ordered :No           Order to transfer to unit without monitor: n/a     Last MEWS: 2 Time completed: 0259    Deterioration Index Score:   Predictive Model Details          40 (Caution)  Factor Value    Calculated 8/17/2024 02:58 35% Age 89 years old    Deterioration Index Model 28% Supplemental oxygen Nasal cannula     16% Hanny coma scale 14     8% Pulse oximetry 90 %     6% Respiratory rate 18     3% Sodium abnormal (131 mmol/L)     2% BUN abnormal (59 mg/dL)     2% Potassium 3.5 mmol/L     0% Pulse 80     0% Systolic 110     0% WBC count 5.7 k/uL     0% Hematocrit 37.9 %     0% Temperature 98.3 °F (36.8 °C)        Vitals:    08/17/24 0214 08/17/24 0230 08/17/24 0257 08/17/24 0258   BP: (!) 105/55 110/78     Pulse: 75   80   Resp:   18    Temp:       TempSrc:       SpO2: 90%  90%    Height:             Opportunity for questions and clarification was provided.

## 2024-08-17 NOTE — PLAN OF CARE
Problem: Discharge Planning  Goal: Discharge to home or other facility with appropriate resources  8/17/2024 1018 by Jo Ann Titus RN  Outcome: Progressing  8/17/2024 0449 by Lona Scales RN  Outcome: Progressing  Flowsheets (Taken 8/17/2024 0400)  Discharge to home or other facility with appropriate resources: Identify barriers to discharge with patient and caregiver     Problem: Safety - Adult  Goal: Free from fall injury  8/17/2024 1018 by Jo Ann Titus RN  Outcome: Progressing  8/17/2024 0449 by Lona Scales RN  Outcome: Progressing     Problem: Skin/Tissue Integrity  Goal: Absence of new skin breakdown  8/17/2024 1018 by Jo Ann Titus RN  Outcome: Progressing  8/17/2024 0449 by Lona Scales RN  Outcome: Progressing     Problem: Pain  Goal: Verbalizes/displays adequate comfort level or baseline comfort level  8/17/2024 1018 by Jo Ann Titus RN  Outcome: Progressing  8/17/2024 0449 by Lona Scales RN  Outcome: Progressing  Flowsheets (Taken 8/17/2024 0400)  Verbalizes/displays adequate comfort level or baseline comfort level: Assess pain using appropriate pain scale     Problem: ABCDS Injury Assessment  Goal: Absence of physical injury  Outcome: Progressing

## 2024-08-17 NOTE — PROGRESS NOTES
Full consult to follow  Discussed with both of her sons on the phone and they would like to go with comfort measures and would like to discuss with hospice    She has a strangulated femoral hernia which her only solution would be surgery and they state that she would not want surgery which I feel is reasonable as well    Will await to hospice recs and will be available as needed    Electronically signed by Abhijit Wood DO on 8/17/2024 at 7:50 AM

## 2024-08-17 NOTE — PLAN OF CARE
Problem: Discharge Planning  Goal: Discharge to home or other facility with appropriate resources  8/17/2024 1118 by Jo Ann Titus RN  Outcome: Adequate for Discharge  8/17/2024 1018 by Jo Ann Titus RN  Outcome: Progressing  8/17/2024 0449 by Lona Scales RN  Outcome: Progressing  Flowsheets (Taken 8/17/2024 0400)  Discharge to home or other facility with appropriate resources: Identify barriers to discharge with patient and caregiver     Problem: Safety - Adult  Goal: Free from fall injury  8/17/2024 1118 by Jo Ann Titus RN  Outcome: Adequate for Discharge  8/17/2024 1018 by Jo Ann Titus RN  Outcome: Progressing  8/17/2024 0449 by Lona Scales RN  Outcome: Progressing     Problem: Skin/Tissue Integrity  Goal: Absence of new skin breakdown  8/17/2024 1118 by Jo Ann Titus RN  Outcome: Adequate for Discharge  8/17/2024 1018 by Jo Ann Titus RN  Outcome: Progressing  8/17/2024 0449 by Lona Scales RN  Outcome: Progressing     Problem: Pain  Goal: Verbalizes/displays adequate comfort level or baseline comfort level  8/17/2024 1118 by Jo Ann Titus RN  Outcome: Adequate for Discharge  8/17/2024 1018 by Jo Ann Titus RN  Outcome: Progressing  8/17/2024 0449 by Lona Scales RN  Outcome: Progressing  Flowsheets (Taken 8/17/2024 0400)  Verbalizes/displays adequate comfort level or baseline comfort level: Assess pain using appropriate pain scale     Problem: ABCDS Injury Assessment  Goal: Absence of physical injury  8/17/2024 1118 by Jo Ann Titus RN  Outcome: Adequate for Discharge  8/17/2024 1018 by Jo Ann Titus RN  Outcome: Progressing

## 2024-08-17 NOTE — PROGRESS NOTES
Consult received and chart reviewed. Visit made to bedside. Pt was in bed sleeping, noted to be grimacing and furrowing eyebrows. Son elizabeth and Theodore in room and conversation had at bedside discussing hospice hospice and pt wishes.   The hospice benefit and philosophy were explained including that hospice is end of life care in which, per Medicare, a patient has a terminal diagnosis that life expectancy would be 6 months or less. Explained that once in hospice care, all aggressive treatments would be stopped and allow nature to takes its course with focus on comfort care for the patient.  Explained hospice services at home, at [facility] with room/board private pay unless patient has Medicaid and the Hospice House for short-term symptom management and respite.  Family would like pt to go to [facility] for symptom management.     Call placed to Dr. Myrna Haque who accepted pt for GIP level of care at the hospice [place] for symptomatic management.     PAS set for 12-1215pm  N2N given to    Transport docs placed in soft chart   Please leave IV in place and please medicate prior to transport.  Updated to RN, and family    Electronically signed by Elsa Carranza RN on 8/17/2024 at 11:44 AM  [phone]

## 2024-08-17 NOTE — ED NOTES
Department of Emergency Medicine  FIRST PROVIDER TRIAGE NOTE             Independent MLP           8/16/24  8:47 PM EDT    Date of Encounter: 8/16/24   MRN: [id number]      HPI: Naty Butler is a 89 y.o. female who presents to the ED for Emesis (Started today and family stated it is black)     STARTED VOMITING THIS AFTERNOON.  BLACK EMESIS PER FAMILY MEMBER.  WAS AT PCP TODAY DUE TO INCREASING WEAKNESS.      ROS: Negative for fever or cough.    PE: Gen Appearance/Constitutional: alert    Provider-Patient relationship only established for Provider In Triage (PIT)  Full assessment, HPI and examination not performed, therefore, it is not yet possible to state whether or not an emergency medical condition exists   Focused assessment only during triage. This is not a comprehensive evaluation due to no physical ER space, staff shortage and high numbers of boarding patients in the ER.       Initial Plan of Care: All treatment areas with department are currently occupied. Plan to order/Initiate the following while awaiting opening in ED.  Initiate Treatment-Testing, Proceed toTreatment Area When Bed Available for ED Attending/MLP to Continue Care    Electronically signed by CHERYL Grigsby CNP   DD: 8/16/24      Joni Hess APRN - CNP  08/16/24 3427

## 2024-08-17 NOTE — ED PROVIDER NOTES
[facility]  EMERGENCY DEPARTMENT ENCOUNTER        Pt Name: Naty Butler  MRN: [id number]  Birthdate 3/13/1932  Date of evaluation: 8/16/2024  Provider: Royce Logan DO  PCP: Alvin Mckeon MD  Note Started: 9:25 PM EDT 8/16/24    CHIEF COMPLAINT       Chief Complaint   Patient presents with    Emesis     Started today and family stated it is black       HISTORY OF PRESENT ILLNESS: 1 or more Elements     History from : Patient and Family son    Limitations to history : None    Naty Butler is a 89 y.o. female who presents from home w son was seen by pcp dr mckeon and told to come in today.  Patient has had a 7 pound weight loss over the past several months.  She has chronic right shoulder pain and effusions.  This is unchanged.  Patient has had increased weakness has had lower extremity swelling.  Patient denies chest pain or cough denies diarrhea.  Patient has had vomiting multiple times today that was dark and black appearing.  She has new abdominal distention and diffuse discomfort.  Denies any constipation or diarrhea denies any chest pain denies shortness of breath denies fever patient is generally weak.  Patient's son states PCP recommended they come the ER for evaluation he did talk to them about hospice and palliative care patient's son stated they did not think they are ready at this time however are considering it.    Nursing Notes were all reviewed and agreed with or any disagreements were addressed in the HPI.    REVIEW OF SYSTEMS :      Review of Systems   Respiratory:  Negative for cough and shortness of breath.    Cardiovascular:  Positive for leg swelling. Negative for chest pain and palpitations.   Gastrointestinal:  Positive for abdominal pain, nausea and vomiting.       Positives and Pertinent negatives as per HPI.     SURGICAL HISTORY     Past Surgical History:   Procedure Laterality Date    CATARACT REMOVAL WITH IMPLANT Bilateral     COLONOSCOPY  08/09/2010     administration in time range)   ondansetron (ZOFRAN-ODT) disintegrating tablet 4 mg (has no administration in time range)     Or   ondansetron (ZOFRAN) injection 4 mg (has no administration in time range)   morphine (PF) injection 1 mg (has no administration in time range)     Or   morphine (PF) injection 2 mg (has no administration in time range)   sodium chloride 0.9 % bolus 1,000 mL (0 mLs IntraVENous Stopped 8/16/24 2334)   ondansetron (ZOFRAN) injection 4 mg (4 mg IntraVENous Given 8/16/24 2204)   pantoprazole (PROTONIX) injection 80 mg (80 mg IntraVENous Given 8/16/24 2206)   iopamidol (ISOVUE-370) 76 % injection 75 mL (75 mLs IntraVENous Given 8/17/24 0023)   LORazepam (ATIVAN) injection 0.5 mg (0.5 mg IntraVENous Given 8/17/24 0322)       ED Course as of 08/17/24 0603   Fri Aug 16, 2024   2145 Brown stool HEME NEGATIVE, chaperone bertin VALLE [ASHLEY]   Sat Aug 17, 2024   0105 D/w family medicine dr de will admit and would like dr shin consulted for gen surg, family noted they would not want any surgery [ASHLEY]   0109 D/w gen surg dr shin who will see in consult agrees w ng tube [ASHLEY]      ED Course User Index  [ASHLEY] Royce Logan DO        [unfilled]    Chronic Conditions:   Active Ambulatory Problems     Diagnosis Date Noted    Vascular dementia without behavioral disturbance (HCC) 05/28/2019    Essential hypertension 08/28/2012    Vitamin D deficiency 05/28/2019    Primary osteoarthritis involving multiple joints 05/28/2019    Hyperlipidemia 08/28/2012    Macular degeneration 02/07/2020    Hyponatremia 07/07/2021    Hard of hearing     Primary osteoarthritis of right shoulder 11/15/2021    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

## 2024-08-18 LAB
CULTURE: NORMAL
CULTURE: NORMAL
MICROORGANISM SPEC CULT: NORMAL
MICROORGANISM SPEC CULT: NORMAL
SERVICE CMNT-IMP: NORMAL
SERVICE CMNT-IMP: NORMAL
SPECIMEN DESCRIPTION: NORMAL

## 2024-08-19 LAB
EKG ATRIAL RATE: 87 BPM
EKG P AXIS: 34 DEGREES
EKG P-R INTERVAL: 158 MS
EKG Q-T INTERVAL: 350 MS
EKG QRS DURATION: 92 MS
EKG QTC CALCULATION (BAZETT): 421 MS
EKG R AXIS: -47 DEGREES
EKG T AXIS: 70 DEGREES
EKG VENTRICULAR RATE: 87 BPM

## 2024-08-19 PROCEDURE — 93010 ELECTROCARDIOGRAM REPORT: CPT | Performed by: INTERNAL MEDICINE

## 2024-08-22 LAB
MICROORGANISM SPEC CULT: NORMAL
MICROORGANISM SPEC CULT: NORMAL
SERVICE CMNT-IMP: NORMAL
SERVICE CMNT-IMP: NORMAL
SPECIMEN DESCRIPTION: NORMAL
SPECIMEN DESCRIPTION: NORMAL

## 2024-08-23 ASSESSMENT — ENCOUNTER SYMPTOMS
FACIAL SWELLING: 0
BLOOD IN STOOL: 0
DIARRHEA: 0
COUGH: 0
VOMITING: 0
SHORTNESS OF BREATH: 0
CHEST TIGHTNESS: 0
COLOR CHANGE: 0
SINUS PAIN: 0
RHINORRHEA: 0
SINUS PRESSURE: 0
ABDOMINAL DISTENTION: 0
PHOTOPHOBIA: 0
CONSTIPATION: 0
APNEA: 0

## 2024-08-23 NOTE — ASSESSMENT & PLAN NOTE
Uncontrolled, continue current medications and unfortunate progression.  However the patient is deteriorating significantly.

## 2024-08-23 NOTE — ASSESSMENT & PLAN NOTE
Uncontrolled, I would like to continue with current medications however the patient will most likely need and addition I will try some BuSpar 5 mg.

## 2024-08-23 NOTE — ASSESSMENT & PLAN NOTE
Well-controlled, continue current medications    Orders:    Comprehensive Metabolic Panel; Future    CBC with Auto Differential; Future

## 2024-08-23 NOTE — PROGRESS NOTES
appropriatehealth screening exams and vaccinations.  Advised patient regarding importance of keeping up with recommended health maintenance and to schedule as soon as possible if overdue, as this is important in assessing forundiagnosed pathology, especially cancer, as well as protecting against potentially harmful/life threatening disease.        Patient and/or guardian verbalizes understanding and agrees with above counseling,assessment and plan.    All questions answered.